# Patient Record
Sex: MALE | Race: WHITE | NOT HISPANIC OR LATINO | Employment: UNEMPLOYED | URBAN - METROPOLITAN AREA
[De-identification: names, ages, dates, MRNs, and addresses within clinical notes are randomized per-mention and may not be internally consistent; named-entity substitution may affect disease eponyms.]

---

## 2017-03-01 ENCOUNTER — APPOINTMENT (OUTPATIENT)
Dept: CT IMAGING | Facility: CLINIC | Age: 47
DRG: 896 | End: 2017-03-01
Attending: EMERGENCY MEDICINE
Payer: COMMERCIAL

## 2017-03-01 ENCOUNTER — APPOINTMENT (OUTPATIENT)
Dept: GENERAL RADIOLOGY | Facility: CLINIC | Age: 47
DRG: 896 | End: 2017-03-01
Attending: EMERGENCY MEDICINE
Payer: COMMERCIAL

## 2017-03-01 ENCOUNTER — HOSPITAL ENCOUNTER (INPATIENT)
Facility: CLINIC | Age: 47
LOS: 4 days | Discharge: HOME | DRG: 896 | End: 2017-03-05
Attending: EMERGENCY MEDICINE | Admitting: INTERNAL MEDICINE
Payer: COMMERCIAL

## 2017-03-01 DIAGNOSIS — J96.01 ACUTE RESPIRATORY FAILURE WITH HYPOXIA (H): ICD-10-CM

## 2017-03-01 DIAGNOSIS — F10.929 ALCOHOL INTOXICATION, WITH UNSPECIFIED COMPLICATION (H): ICD-10-CM

## 2017-03-01 DIAGNOSIS — J18.9 ATYPICAL PNEUMONIA: ICD-10-CM

## 2017-03-01 DIAGNOSIS — Z29.89 SEIZURE PROPHYLAXIS: ICD-10-CM

## 2017-03-01 DIAGNOSIS — J31.0 CHRONIC RHINITIS: Primary | ICD-10-CM

## 2017-03-01 PROBLEM — T51.91XD: Status: ACTIVE | Noted: 2017-03-01

## 2017-03-01 LAB
ALBUMIN SERPL-MCNC: 4.2 G/DL (ref 3.4–5)
ALBUMIN UR-MCNC: NEGATIVE MG/DL
ALP SERPL-CCNC: 89 U/L (ref 40–150)
ALT SERPL W P-5'-P-CCNC: 59 U/L (ref 0–70)
AMPHETAMINES UR QL SCN: ABNORMAL
ANION GAP SERPL CALCULATED.3IONS-SCNC: 13 MMOL/L (ref 3–14)
APPEARANCE UR: CLEAR
AST SERPL W P-5'-P-CCNC: 58 U/L (ref 0–45)
BARBITURATES UR QL: ABNORMAL
BASE DEFICIT BLDA-SCNC: 4.9 MMOL/L
BASOPHILS # BLD AUTO: 0.1 10E9/L (ref 0–0.2)
BASOPHILS NFR BLD AUTO: 0.8 %
BENZODIAZ UR QL: ABNORMAL
BILIRUB DIRECT SERPL-MCNC: <0.1 MG/DL (ref 0–0.2)
BILIRUB SERPL-MCNC: 0.3 MG/DL (ref 0.2–1.3)
BILIRUB UR QL STRIP: NEGATIVE
BUN SERPL-MCNC: 12 MG/DL (ref 7–30)
CALCIUM SERPL-MCNC: 8.1 MG/DL (ref 8.5–10.1)
CANNABINOIDS UR QL SCN: ABNORMAL
CHLORIDE SERPL-SCNC: 109 MMOL/L (ref 94–109)
CO2 SERPL-SCNC: 22 MMOL/L (ref 20–32)
COCAINE UR QL: ABNORMAL
COLOR UR AUTO: ABNORMAL
CREAT SERPL-MCNC: 0.68 MG/DL (ref 0.66–1.25)
DIFFERENTIAL METHOD BLD: ABNORMAL
EOSINOPHIL # BLD AUTO: 0.2 10E9/L (ref 0–0.7)
EOSINOPHIL NFR BLD AUTO: 1.8 %
ERYTHROCYTE [DISTWIDTH] IN BLOOD BY AUTOMATED COUNT: 14.9 % (ref 10–15)
ETHANOL SERPL-MCNC: 0.5 G/DL
ETHANOL UR QL SCN: ABNORMAL
GFR SERPL CREATININE-BSD FRML MDRD: ABNORMAL ML/MIN/1.7M2
GLUCOSE SERPL-MCNC: 118 MG/DL (ref 70–99)
GLUCOSE UR STRIP-MCNC: 30 MG/DL
GRAM STN SPEC: ABNORMAL
HCO3 BLD-SCNC: 20 MMOL/L (ref 21–28)
HCT VFR BLD AUTO: 40.1 % (ref 40–53)
HGB BLD-MCNC: 13.9 G/DL (ref 13.3–17.7)
HGB UR QL STRIP: NEGATIVE
HYALINE CASTS #/AREA URNS LPF: 2 /LPF (ref 0–2)
IMM GRANULOCYTES # BLD: 0 10E9/L (ref 0–0.4)
IMM GRANULOCYTES NFR BLD: 0.2 %
INR PPP: 0.97 (ref 0.86–1.14)
INR PPP: NORMAL (ref 0.86–1.14)
INTERPRETATION ECG - MUSE: NORMAL
KETONES UR STRIP-MCNC: NEGATIVE MG/DL
LEUKOCYTE ESTERASE UR QL STRIP: NEGATIVE
LYMPHOCYTES # BLD AUTO: 3.9 10E9/L (ref 0.8–5.3)
LYMPHOCYTES NFR BLD AUTO: 43.5 %
MAGNESIUM SERPL-MCNC: 1.9 MG/DL (ref 1.6–2.3)
MCH RBC QN AUTO: 32.3 PG (ref 26.5–33)
MCHC RBC AUTO-ENTMCNC: 34.7 G/DL (ref 31.5–36.5)
MCV RBC AUTO: 93 FL (ref 78–100)
MICRO REPORT STATUS: ABNORMAL
MONOCYTES # BLD AUTO: 0.4 10E9/L (ref 0–1.3)
MONOCYTES NFR BLD AUTO: 4.7 %
MRSA DNA SPEC QL NAA+PROBE: NORMAL
MUCOUS THREADS #/AREA URNS LPF: PRESENT /LPF
NEUTROPHILS # BLD AUTO: 4.3 10E9/L (ref 1.6–8.3)
NEUTROPHILS NFR BLD AUTO: 49 %
NITRATE UR QL: NEGATIVE
NRBC # BLD AUTO: 0 10*3/UL
NRBC BLD AUTO-RTO: 0 /100
O2/TOTAL GAS SETTING VFR VENT: 60 %
OPIATES UR QL SCN: ABNORMAL
PCO2 BLD: 36 MM HG (ref 35–45)
PH BLD: 7.35 PH (ref 7.35–7.45)
PH UR STRIP: 5.5 PH (ref 5–7)
PLATELET # BLD AUTO: 124 10E9/L (ref 150–450)
PO2 BLD: 168 MM HG (ref 80–105)
POTASSIUM SERPL-SCNC: 3.1 MMOL/L (ref 3.4–5.3)
POTASSIUM SERPL-SCNC: 4.9 MMOL/L (ref 3.4–5.3)
PROCALCITONIN SERPL-MCNC: NORMAL NG/ML
PROT SERPL-MCNC: 7.2 G/DL (ref 6.8–8.8)
RBC # BLD AUTO: 4.3 10E12/L (ref 4.4–5.9)
RBC #/AREA URNS AUTO: <1 /HPF (ref 0–2)
SODIUM SERPL-SCNC: 144 MMOL/L (ref 133–144)
SP GR UR STRIP: 1.01 (ref 1–1.03)
SPECIMEN SOURCE: ABNORMAL
SPECIMEN SOURCE: NORMAL
URN SPEC COLLECT METH UR: ABNORMAL
UROBILINOGEN UR STRIP-MCNC: NORMAL MG/DL (ref 0–2)
WBC # BLD AUTO: 8.9 10E9/L (ref 4–11)
WBC #/AREA URNS AUTO: <1 /HPF (ref 0–2)

## 2017-03-01 PROCEDURE — 84132 ASSAY OF SERUM POTASSIUM: CPT | Performed by: STUDENT IN AN ORGANIZED HEALTH CARE EDUCATION/TRAINING PROGRAM

## 2017-03-01 PROCEDURE — 84145 PROCALCITONIN (PCT): CPT | Performed by: EMERGENCY MEDICINE

## 2017-03-01 PROCEDURE — 36416 COLLJ CAPILLARY BLOOD SPEC: CPT | Performed by: STUDENT IN AN ORGANIZED HEALTH CARE EDUCATION/TRAINING PROGRAM

## 2017-03-01 PROCEDURE — 80076 HEPATIC FUNCTION PANEL: CPT | Performed by: EMERGENCY MEDICINE

## 2017-03-01 PROCEDURE — 87070 CULTURE OTHR SPECIMN AEROBIC: CPT | Performed by: EMERGENCY MEDICINE

## 2017-03-01 PROCEDURE — 94002 VENT MGMT INPAT INIT DAY: CPT

## 2017-03-01 PROCEDURE — 81001 URINALYSIS AUTO W/SCOPE: CPT | Performed by: EMERGENCY MEDICINE

## 2017-03-01 PROCEDURE — 85610 PROTHROMBIN TIME: CPT | Performed by: EMERGENCY MEDICINE

## 2017-03-01 PROCEDURE — 25000128 H RX IP 250 OP 636: Performed by: EMERGENCY MEDICINE

## 2017-03-01 PROCEDURE — 40000275 ZZH STATISTIC RCP TIME EA 10 MIN

## 2017-03-01 PROCEDURE — 87641 MR-STAPH DNA AMP PROBE: CPT | Performed by: STUDENT IN AN ORGANIZED HEALTH CARE EDUCATION/TRAINING PROGRAM

## 2017-03-01 PROCEDURE — HZ2ZZZZ DETOXIFICATION SERVICES FOR SUBSTANCE ABUSE TREATMENT: ICD-10-PCS | Performed by: EMERGENCY MEDICINE

## 2017-03-01 PROCEDURE — 71010 XR CHEST PORT 1 VW: CPT

## 2017-03-01 PROCEDURE — 25000125 ZZHC RX 250: Performed by: STUDENT IN AN ORGANIZED HEALTH CARE EDUCATION/TRAINING PROGRAM

## 2017-03-01 PROCEDURE — 40000276 ZZH STATISTIC RCP TIME ED VENT EA 10 MIN

## 2017-03-01 PROCEDURE — 80048 BASIC METABOLIC PNL TOTAL CA: CPT | Performed by: EMERGENCY MEDICINE

## 2017-03-01 PROCEDURE — 87640 STAPH A DNA AMP PROBE: CPT | Performed by: STUDENT IN AN ORGANIZED HEALTH CARE EDUCATION/TRAINING PROGRAM

## 2017-03-01 PROCEDURE — 40000281 ZZH STATISTIC TRANSPORT TIME EA 15 MIN

## 2017-03-01 PROCEDURE — 80307 DRUG TEST PRSMV CHEM ANLYZR: CPT | Performed by: EMERGENCY MEDICINE

## 2017-03-01 PROCEDURE — 80320 DRUG SCREEN QUANTALCOHOLS: CPT | Performed by: EMERGENCY MEDICINE

## 2017-03-01 PROCEDURE — 25000128 H RX IP 250 OP 636

## 2017-03-01 PROCEDURE — 36600 WITHDRAWAL OF ARTERIAL BLOOD: CPT

## 2017-03-01 PROCEDURE — 87186 SC STD MICRODIL/AGAR DIL: CPT | Performed by: EMERGENCY MEDICINE

## 2017-03-01 PROCEDURE — 5A1935Z RESPIRATORY VENTILATION, LESS THAN 24 CONSECUTIVE HOURS: ICD-10-PCS | Performed by: EMERGENCY MEDICINE

## 2017-03-01 PROCEDURE — 70450 CT HEAD/BRAIN W/O DYE: CPT

## 2017-03-01 PROCEDURE — 20000004 ZZH R&B ICU UMMC

## 2017-03-01 PROCEDURE — 82803 BLOOD GASES ANY COMBINATION: CPT | Performed by: EMERGENCY MEDICINE

## 2017-03-01 PROCEDURE — 87205 SMEAR GRAM STAIN: CPT | Performed by: EMERGENCY MEDICINE

## 2017-03-01 PROCEDURE — A7035 POS AIRWAY PRESS HEADGEAR: HCPCS

## 2017-03-01 PROCEDURE — 87077 CULTURE AEROBIC IDENTIFY: CPT | Performed by: EMERGENCY MEDICINE

## 2017-03-01 PROCEDURE — 40000940 XR CHEST PORT 1 VW

## 2017-03-01 PROCEDURE — 83735 ASSAY OF MAGNESIUM: CPT | Performed by: EMERGENCY MEDICINE

## 2017-03-01 PROCEDURE — 25000125 ZZHC RX 250: Performed by: EMERGENCY MEDICINE

## 2017-03-01 PROCEDURE — 85025 COMPLETE CBC W/AUTO DIFF WBC: CPT | Performed by: EMERGENCY MEDICINE

## 2017-03-01 PROCEDURE — 99291 CRITICAL CARE FIRST HOUR: CPT | Mod: GC | Performed by: INTERNAL MEDICINE

## 2017-03-01 RX ORDER — LORAZEPAM 2 MG/ML
INJECTION INTRAMUSCULAR
Status: COMPLETED
Start: 2017-03-01 | End: 2017-03-01

## 2017-03-01 RX ORDER — POTASSIUM CHLORIDE 1.5 G/1.58G
20-40 POWDER, FOR SOLUTION ORAL
Status: DISCONTINUED | OUTPATIENT
Start: 2017-03-01 | End: 2017-03-05 | Stop reason: HOSPADM

## 2017-03-01 RX ORDER — POTASSIUM CHLORIDE 29.8 MG/ML
20 INJECTION INTRAVENOUS
Status: DISCONTINUED | OUTPATIENT
Start: 2017-03-01 | End: 2017-03-05 | Stop reason: HOSPADM

## 2017-03-01 RX ORDER — DIAZEPAM 10 MG/2ML
5-20 INJECTION, SOLUTION INTRAMUSCULAR; INTRAVENOUS SEE ADMIN INSTRUCTIONS
Status: DISCONTINUED | OUTPATIENT
Start: 2017-03-01 | End: 2017-03-02

## 2017-03-01 RX ORDER — POTASSIUM CHLORIDE 7.45 MG/ML
10 INJECTION INTRAVENOUS
Status: DISCONTINUED | OUTPATIENT
Start: 2017-03-01 | End: 2017-03-05 | Stop reason: HOSPADM

## 2017-03-01 RX ORDER — PROPOFOL 10 MG/ML
5-75 INJECTION, EMULSION INTRAVENOUS CONTINUOUS
Status: DISCONTINUED | OUTPATIENT
Start: 2017-03-01 | End: 2017-03-01

## 2017-03-01 RX ORDER — DIAZEPAM 5 MG
5-20 TABLET ORAL SEE ADMIN INSTRUCTIONS
Status: DISCONTINUED | OUTPATIENT
Start: 2017-03-01 | End: 2017-03-05 | Stop reason: HOSPADM

## 2017-03-01 RX ORDER — POTASSIUM CHLORIDE 750 MG/1
20-40 TABLET, EXTENDED RELEASE ORAL
Status: DISCONTINUED | OUTPATIENT
Start: 2017-03-01 | End: 2017-03-05 | Stop reason: HOSPADM

## 2017-03-01 RX ORDER — PROPOFOL 10 MG/ML
10-20 INJECTION, EMULSION INTRAVENOUS EVERY 30 MIN PRN
Status: DISCONTINUED | OUTPATIENT
Start: 2017-03-01 | End: 2017-03-02

## 2017-03-01 RX ORDER — NALOXONE HYDROCHLORIDE 0.4 MG/ML
.1-.4 INJECTION, SOLUTION INTRAMUSCULAR; INTRAVENOUS; SUBCUTANEOUS
Status: DISCONTINUED | OUTPATIENT
Start: 2017-03-01 | End: 2017-03-05 | Stop reason: HOSPADM

## 2017-03-01 RX ORDER — LORAZEPAM 2 MG/ML
1 INJECTION INTRAMUSCULAR ONCE
Status: COMPLETED | OUTPATIENT
Start: 2017-03-01 | End: 2017-03-01

## 2017-03-01 RX ORDER — LEVETIRACETAM 750 MG/1
1500 TABLET ORAL 2 TIMES DAILY
Status: DISCONTINUED | OUTPATIENT
Start: 2017-03-01 | End: 2017-03-01

## 2017-03-01 RX ORDER — NALOXONE HYDROCHLORIDE 0.4 MG/ML
.1-.4 INJECTION, SOLUTION INTRAMUSCULAR; INTRAVENOUS; SUBCUTANEOUS
Status: DISCONTINUED | OUTPATIENT
Start: 2017-03-01 | End: 2017-03-01

## 2017-03-01 RX ORDER — SODIUM CHLORIDE 9 MG/ML
1000 INJECTION, SOLUTION INTRAVENOUS CONTINUOUS
Status: DISCONTINUED | OUTPATIENT
Start: 2017-03-01 | End: 2017-03-03

## 2017-03-01 RX ORDER — LORAZEPAM 2 MG/ML
1 INJECTION INTRAMUSCULAR
Status: DISCONTINUED | OUTPATIENT
Start: 2017-03-01 | End: 2017-03-02

## 2017-03-01 RX ORDER — PROPOFOL 10 MG/ML
5-75 INJECTION, EMULSION INTRAVENOUS CONTINUOUS
Status: DISCONTINUED | OUTPATIENT
Start: 2017-03-01 | End: 2017-03-02

## 2017-03-01 RX ADMIN — SODIUM CHLORIDE 1000 ML: 9 INJECTION, SOLUTION INTRAVENOUS at 10:49

## 2017-03-01 RX ADMIN — LORAZEPAM 1 MG: 2 INJECTION INTRAMUSCULAR; INTRAVENOUS at 12:15

## 2017-03-01 RX ADMIN — SODIUM CHLORIDE 1000 ML: 9 INJECTION, SOLUTION INTRAVENOUS at 18:26

## 2017-03-01 RX ADMIN — SUCCINYLCHOLINE CHLORIDE 100 MG: 20 INJECTION, SOLUTION INTRAMUSCULAR; INTRAVENOUS at 10:52

## 2017-03-01 RX ADMIN — LORAZEPAM 1 MG: 2 INJECTION INTRAMUSCULAR; INTRAVENOUS at 15:19

## 2017-03-01 RX ADMIN — FOLIC ACID: 5 INJECTION, SOLUTION INTRAMUSCULAR; INTRAVENOUS; SUBCUTANEOUS at 18:26

## 2017-03-01 RX ADMIN — LORAZEPAM 1 MG: 2 INJECTION INTRAMUSCULAR; INTRAVENOUS at 13:39

## 2017-03-01 RX ADMIN — LORAZEPAM 1 MG: 2 INJECTION INTRAMUSCULAR; INTRAVENOUS at 15:34

## 2017-03-01 RX ADMIN — LORAZEPAM 1 MG: 2 INJECTION INTRAMUSCULAR; INTRAVENOUS at 15:41

## 2017-03-01 RX ADMIN — SODIUM CHLORIDE 500 ML: 9 INJECTION, SOLUTION INTRAVENOUS at 13:41

## 2017-03-01 RX ADMIN — LORAZEPAM 1 MG: 2 INJECTION INTRAMUSCULAR; INTRAVENOUS at 12:30

## 2017-03-01 RX ADMIN — LORAZEPAM 1 MG: 2 INJECTION INTRAMUSCULAR; INTRAVENOUS at 11:10

## 2017-03-01 RX ADMIN — SODIUM CHLORIDE 500 ML: 9 INJECTION, SOLUTION INTRAVENOUS at 14:22

## 2017-03-01 RX ADMIN — LORAZEPAM 1 MG: 2 INJECTION INTRAMUSCULAR at 11:10

## 2017-03-01 RX ADMIN — SODIUM CHLORIDE 1000 ML: 9 INJECTION, SOLUTION INTRAVENOUS at 08:42

## 2017-03-01 RX ADMIN — FOLIC ACID: 5 INJECTION, SOLUTION INTRAMUSCULAR; INTRAVENOUS; SUBCUTANEOUS at 09:25

## 2017-03-01 RX ADMIN — PROPOFOL 5 MCG/KG/MIN: 10 INJECTION, EMULSION INTRAVENOUS at 15:44

## 2017-03-01 NOTE — ED NOTES
Anirudh presents by ambulance after a bystander witnessed him passed out in a doorway and called EMS. Anirudh appears intoxicated with decreased LOC and snoring respirations. History of substance abuse. EMS stated he smelled like mouthwash. No sign of injury or trauma. Satting in the 70s on RA on arrival, nasal airway and face mask placed with 5L 02. Now satting 98%.

## 2017-03-01 NOTE — PROGRESS NOTES
Report given to Roopa ALMAGUER on 4C, bed unavailable. 4C will update when bed becomes available.

## 2017-03-01 NOTE — IP AVS SNAPSHOT
[address] [address]    Phone:  [phone]                                       After Visit Summary   3/1/2017    [facility] AURELIO Motley    MRN: [id number]           After Visit Summary Signature Page     I have received my discharge instructions, and my questions have been answered. I have discussed any challenges I see with this plan with the nurse or doctor.    ..........................................................................................................................................  Patient/Patient Representative Signature      ..........................................................................................................................................  Patient Representative Print Name and Relationship to Patient    ..................................................               ................................................  Date                                            Time    ..........................................................................................................................................  Reviewed by Signature/Title    ...................................................              ..............................................  Date                                                            Time

## 2017-03-01 NOTE — PROGRESS NOTES
Pt arerived to unit accompanied by ED RNs and RT. Mechanically ventilated and sedated. Occasionally agitated and needing restraints. Unable to follow commands. VSS, afebrile. Propofol infusing @ 25 mcg/kg/min, NS infusing @ 125 mL/hr. Belongings documented.

## 2017-03-01 NOTE — PROGRESS NOTES
[facility], [place]  Procedure Note           Intubation:       Anirudh AURELIO Motley  MRN# [id number]   March 1, 2017, 11:10 AM Indication: Inability to protect airway           Patient intubated at: March 1, 2017  11:10 AM   Patient informed of: Why intubation was required   Informed consent: Not obtainable   Cervical spine: Was not stabilized during the procedure   Sedative medication: Was not administered during the procedure   Technique used: Fiberoptic visualization with GlideScope   Endotracheal tube size: 7.5 cm with cuff   Number of attempts: 3   Placement confirmed by: Auscultation of bilateral breath sounds  Visualization of bilateral chest wall rise  End-tidal CO2 monitor  Chest X-ray   ET tube repositioning: Was not performed   Tube secured at: 25 cm @ teeth      Successful ETT placement on 3rd attempt with BURP and bougie. Pt able to be easily bagged with 2-person BVM and OPA in between attempts. Tube secured with commercial device at 25 cm @ teeth. Positive colormetric change, negative epigastric sounds, equal bilateral breath sounds. ETT suctioned for moderate amount of thick cloudy secretions. See flowsheet for vent settings. RT will continue to follow.      Recorded by Kelly Ling, RRT

## 2017-03-01 NOTE — ED NOTES
Critical Care Time (RN) Mins: 2 RNs times 30 minutes.  Additional 1 RN for next 30 minutes.    Total minutes: 90 minutes.

## 2017-03-01 NOTE — PROGRESS NOTES
Contacted about 1 hour ago in regards to admission to ICU. Working with patient placement. Recommend ABG.     Jace Wiley MD  Pulmonary and Critical Care  [facility]  Pager:  [phone]

## 2017-03-01 NOTE — PHARMACY-ADMISSION MEDICATION HISTORY
"Admission medication history interview status for the 3/1/2017 admission is complete. See Epic admission navigator for allergy information, pharmacy, prior to admission medications and immunization status.     Medication history interview sources:  pt's home medication bottle, Care Everywhere, [facility] (telephone [phone])    Changes made to PTA medication list (reason)  Deleted: amlodipine 5 mg tabs - 1 QD (Care Everywhere note from [facility] on 7/20/16 states the pt is not taking and it was discontinued -- however this note had also discontinued folic acid 1 mg - 1 QD and thiamine 100 mg - 1 QD. Given the pt's current situation of chronic alcohol use and the fact that he has a rx bottle for thiamine with him both the folic acid and thiamine prescriptions were left on the PTA med list), fluoxetine 20 mg cap - 1 QD (there is no indication from [facility] or Care Everywhere notes dating back to 12/15 that indicate the pt has been on this medication as of recently, therefore it was removed)    Additional medication history information (including reliability of information, actions taken by pharmacist):  1. The pt presented to the ED in an altered mental status, with two medication bottles, levetiracetam 1500 mg BID filled on 2/25/17and thiamine 100 mg QD filled on 2/26/17 -- these were left as last dose \"Verified, pt has rx bottles with him at Unknown time\". Both of these prescriptions were from [facility]. I called [facility] pharmacy, they relayed to me the pt has only filled two other prescriptions recently at their pharmacy within the past 3 months these are valacyclovir 1 g TID x 10 days for Herpes Zoster filled on 2/13/17 and naproxen 500 mg BID PRN for pain filled on 2/26/17. These were not added to the pt's current medication list because he did not present with either of these medications.      Prior to Admission medications    Medication Sig Last Dose Taking? Auth " Provider   LevETIRAcetam (KEPPRA PO) Take 1,500 mg by mouth 2 times daily Verified, pt has rx bottle with him at Unknown time Yes Unknown, Entered By History   thiamine 100 MG tablet Take 1 tablet by mouth daily. Verified, pt has rx bottle with him at Unknown time Yes Darion Dickey MD   folic acid (FOLVITE) 1 MG tablet Take 1 tablet by mouth daily. Unknown at Unknown time  Darion Dickey MD   multivitamin, therapeutic with minerals (THERA-VIT-M) TABS Take 1 tablet by mouth daily. Unknown at Unknown time  Darion Dickey MD       Medication history completed by: Rodrigo Torres, PharmD Student

## 2017-03-01 NOTE — IP AVS SNAPSHOT
MRN:[id number]                      After Visit Summary   3/1/2017    [facility] AURELIO Motley    MRN: [id number]           Thank you!     Thank you for choosing [facility] for your care. Our goal is always to provide you with excellent care. Hearing back from our patients is one way we can continue to improve our services. Please take a few minutes to complete the written survey that you may receive in the mail after you visit with us. Thank you!        Patient Information     Date Of Birth          1970        About your hospital stay     You were admitted on:  March 1, 2017 You last received care in the:  [address] [facility]    You were discharged on:  March 5, 2017        Reason for your hospital stay       You were admitted with alcohol intoxication and were treated through your withdrawal.                  Who to Call     For medical emergencies, please call 911.  For non-urgent questions about your medical care, please call your primary care provider or clinic, [phone]          Attending Provider     Provider Specialty    Peter Francis MD Emergency Medicine    Nara Visa, Jace Coffman MD Pulmonary    Radha Allen MD Pediatrics       Primary Care Provider Office Phone # Fax #    [facility] [phone]       [address] [place] [address]        After Care Instructions     Activity       Your activity upon discharge: activity as tolerated            Diet       Follow this diet upon discharge: Orders Placed This Encounter      Room Service      Regular Diet Adult                  Follow-up Appointments     Follow Up and recommended labs and tests       Follow up with primary care provider, [facility], within 7 days for hospital follow- up.  No follow up labs or test are needed.                  Additional Services     Medication Therapy Management Referral       Reason for referral:  adherence to  "medication regimen    This service is designed to help you get the most from your medications.  A specially trained pharmacist will work closely with you and your doctors  to solve any problems related to your medications and to help you get the   best results from taking them.      The Medication Therapy Management staff will call you to schedule an appointment.                  Pending Results     Date and Time Order Name Status Description    3/1/2017 1202 Sputum Culture Aerobic Bacterial Preliminary             Statement of Approval     Ordered          17 1324  I have reviewed and agree with all the recommendations and orders detailed in this document.  EFFECTIVE NOW     Approved and electronically signed by:  Radha Allen MD             Admission Information     Date & Time Provider Department Dept. Phone    3/1/2017 Radha Allen MD [address] [facility] [place] [phone]      Your Vitals Were     Blood Pressure Pulse Temperature Respirations Weight Pulse Oximetry    134/87 (BP Location: Right arm) 60 96.4  F (35.8  C) (Oral) 16 103.4 kg (228 lb) 97%    BMI (Body Mass Index)                   33.67 kg/m2           MyChart Information     [employer] lets you send messages to your doctor, view your test results, renew your prescriptions, schedule appointments and more. To sign up, go to [email or web address] . Click on \"Log in\" on the left side of the screen, which will take you to the Welcome page. Then click on \"Sign up Now\" on the right side of the page.     You will be asked to enter the access code listed below, as well as some personal information. Please follow the directions to create your username and password.     Your access code is: [email or web address]  Expires: 6/3/2017  1:39 PM     Your access code will  in 90 days. If you need help or a new code, please call your [place] clinic or [phone].        Care EveryWhere ID     This is your Care EveryWhere ID. This could be used by " other organizations to access your [facility] medical records  [id number]           Review of your medicines      START taking        Dose / Directions    azithromycin 250 MG tablet   Commonly known as:  ZITHROMAX   Indication:  Community Acquired Pneumonia   Used for:  Atypical pneumonia        Dose:  250 mg   Take 1 tablet (250 mg) by mouth daily for 3 days   Quantity:  3 tablet   Refills:  0       fluticasone 50 MCG/ACT spray   Commonly known as:  FLONASE   Used for:  Chronic rhinitis        Dose:  1 spray   Spray 1 spray into both nostrils daily   Quantity:  1 Bottle   Refills:  0         CONTINUE these medicines which may have CHANGED, or have new prescriptions. If we are uncertain of the size of tablets/capsules you have at home, strength may be listed as something that might have changed.        Dose / Directions    levETIRAcetam 1000 MG Tabs   Commonly known as:  KEPPRA   This may have changed:  medication strength   Used for:  Seizure prophylaxis        Dose:  1500 mg   Take 1,500 mg by mouth 2 times daily   Quantity:  60 tablet   Refills:  0            Where to get your medicines      These medications were sent to [facility] [address] [address]     Phone:  [phone]     azithromycin 250 MG tablet    fluticasone 50 MCG/ACT spray    levETIRAcetam 1000 MG Tabs                Protect others around you: Learn how to safely use, store and throw away your medicines at www.disposemymeds.org.             Medication List: This is a list of all your medications and when to take them. Check marks below indicate your daily home schedule. Keep this list as a reference.      Medications           Morning Afternoon Evening Bedtime As Needed    azithromycin 250 MG tablet   Commonly known as:  ZITHROMAX   Take 1 tablet (250 mg) by mouth daily for 3 days   Last time this was given:  250 mg on 3/5/2017  8:30 AM                                 fluticasone 50 MCG/ACT spray   Commonly known as:  FLONASE   Spray 1 spray into both nostrils daily   Last time this was given:  1 spray on 3/5/2017 11:20 AM                                levETIRAcetam 1000 MG Tabs   Commonly known as:  KEPPRA   Take 1,500 mg by mouth 2 times daily   Last time this was given:  1,500 mg on 3/5/2017  8:30 AM

## 2017-03-01 NOTE — ED PROVIDER NOTES
History     Chief Complaint   Patient presents with     Alcohol Intoxication     HPI  [facility] AURELIO Motley is a 46 year old male with a history of alcohol abuse and 3 previous admissions for her intoxication who presents after a bystander notified emergency response at the client was passed out in the doorway.  Per paramedics, he was noted to be intoxicated with snoring respirations and minimal response.  He apparently smelled like alcohol.  As he was manifesting oxygen saturations in the 70s on room air, nasal airway in facemask were applied.  Patient is unable to give any type of history secondary to his intoxication.  I have reviewed the Medications, Allergies, Past Medical and Surgical History, and Social History in the Epic system.    Review of Systems   Unable to perform ROS: Patient unresponsive       Physical Exam   BP: 136/88  Heart Rate: 91  Temp: 96  F (35.6  C)  Resp: 15  Weight: 90.7 kg (200 lb)  SpO2: (!) 75 %  Physical Exam   Constitutional: He appears well-developed. No distress.   HENT:   Head: Normocephalic and atraumatic.   Mouth/Throat: Oropharynx is clear and moist. No oropharyngeal exudate.   Eyes: Pupils are equal, round, and reactive to light. No scleral icterus. Right eye exhibits no nystagmus. Left eye exhibits no nystagmus.   Cardiovascular: Normal heart sounds and intact distal pulses.    Pulmonary/Chest: Breath sounds normal. No respiratory distress.   Abdominal: Soft. Bowel sounds are normal. There is no tenderness.   Musculoskeletal: He exhibits no edema or tenderness.   Neurological: He is unresponsive. Cranial nerve deficit: bilateral, lateral gaze nystagmus. GCS eye subscore is 2. GCS verbal subscore is 2. GCS motor subscore is 5.   Skin: Skin is warm. No rash noted. He is not diaphoretic.   Psychiatric: His speech is not slurred. He is noncommunicative. He is inattentive.       ED Course     ED Course     Intubation  Date/Time: 3/1/2017 11:23 AM  Performed by: CHI BUTCHER  "by: CHI FRANCIS   Consent: The procedure was performed in an emergent situation. Verbal consent not obtained.  Imaging studies: imaging studies available  Patient identity confirmed: arm band  Time out: Immediately prior to procedure a \"time out\" was called to verify the correct patient, procedure, equipment, support staff and site/side marked as required.  Indications: airway protection and  hypoxemia  Intubation method: video-assisted  Patient status: unconscious  Preoxygenation: nonrebreather mask  Pretreatment medications: none  Sedatives: see MAR for details  Paralytic: succinylcholine  Laryngoscope size: Mac 3  Tube size: 8.0 mm  Tube type: cuffed  Number of attempts: 3  Ventilation between attempts: BVM  Cricoid pressure: no  Cords visualized: yes  Post-procedure assessment: chest rise and CO2 detector  Breath sounds: equal  Cuff inflated: yes  ETT to lip: 25 cm  ETT to teeth: 23 cm  Tube secured with: ETT arango  Chest x-ray interpreted by me.  Chest x-ray findings: endotracheal tube in appropriate position  Patient tolerance: Patient tolerated the procedure well with no immediate complications                   EKG Interpretation:      Interpreted by Chi Francis  Time reviewed: 8:20 AM  Symptoms at time of EKG: None   Rhythm: normal sinus   Rate: Normal  Axis: Normal  Ectopy: none  Conduction: normal  ST Segments/ T Waves: No ST-T wave changes  Q Waves: III  Comparison to prior: Unchanged from 04/15/2014 other than Q-wave in lead 3 is more prominent    Clinical Impression: no acute changes  Results for orders placed or performed during the hospital encounter of 03/01/17   Chest  XR, 1 view portable    Narrative    Exam: XR CHEST PORT 1 VW, 3/1/2017 11:17 AM    Indication: Intubation: History of alcohol abuse suspected  intoxication.    Comparison: 4/16/2014;    Findings:   AP view of the chest. Endotracheal tube projecting about 3.5 cm above  the ollie. Low lung volumes. Left cardiophrenic angle is " not  completely included. No pleural effusion or pneumothorax. Bibasilar  opacities likely representing basilar atelectasis. Cardiac silhouette  is prominent, possibly due to low lung volume      Impression    Impression:   1. Endotracheal tube projecting about 3.5 cm above the ollie.  2. Bibasilar opacities, may represent atelectasis with low lung  volumes. Additional consideration includes aspiration or mild  pulmonary edema.  3. Prominent cardiac silhouette which may be secondary to low lung  volumes and position.    I have personally reviewed the examination and initial interpretation  and I agree with the findings.    ARJUN MACIEL MD   Head CT w/o contrast    Narrative    CT HEAD W/O CONTRAST 3/1/2017 12:27 PM    Provided History: Unresponsive, rule out bleed    Comparison: 4/15/2014 dated head CT.    Technique: Using multidetector thin collimation helical acquisition  technique, axial, coronal and sagittal CT images from the skull base  to the vertex were obtained without intravenous contrast.     Findings:    No intracranial hemorrhage, mass effect, or midline shift. The  ventricles are proportionate to the cerebral sulci. The gray to white  matter differentiation of the cerebral hemispheres is preserved. The  basal cisterns are patent.    The visualized paranasal sinuses are clear. The mastoid air cells are  clear.       Impression    Impression: No acute intracranial pathology.    MIL GROSS MD   CBC with platelets differential   Result Value Ref Range    WBC 8.9 4.0 - 11.0 10e9/L    RBC Count 4.30 (L) 4.4 - 5.9 10e12/L    Hemoglobin 13.9 13.3 - 17.7 g/dL    Hematocrit 40.1 40.0 - 53.0 %    MCV 93 78 - 100 fl    MCH 32.3 26.5 - 33.0 pg    MCHC 34.7 31.5 - 36.5 g/dL    RDW 14.9 10.0 - 15.0 %    Platelet Count 124 (L) 150 - 450 10e9/L    Diff Method Automated Method     % Neutrophils 49.0 %    % Lymphocytes 43.5 %    % Monocytes 4.7 %    % Eosinophils 1.8 %    % Basophils 0.8 %    % Immature  Granulocytes 0.2 %    Nucleated RBCs 0 0 /100    Absolute Neutrophil 4.3 1.6 - 8.3 10e9/L    Absolute Lymphocytes 3.9 0.8 - 5.3 10e9/L    Absolute Monocytes 0.4 0.0 - 1.3 10e9/L    Absolute Eosinophils 0.2 0.0 - 0.7 10e9/L    Absolute Basophils 0.1 0.0 - 0.2 10e9/L    Abs Immature Granulocytes 0.0 0 - 0.4 10e9/L    Absolute Nucleated RBC 0.0    Basic metabolic panel   Result Value Ref Range    Sodium 144 133 - 144 mmol/L    Potassium 3.1 (L) 3.4 - 5.3 mmol/L    Chloride 109 94 - 109 mmol/L    Carbon Dioxide 22 20 - 32 mmol/L    Anion Gap 13 3 - 14 mmol/L    Glucose 118 (H) 70 - 99 mg/dL    Urea Nitrogen 12 7 - 30 mg/dL    Creatinine 0.68 0.66 - 1.25 mg/dL    GFR Estimate >90  Non  GFR Calc   >60 mL/min/1.7m2    GFR Estimate If Black >90   GFR Calc   >60 mL/min/1.7m2    Calcium 8.1 (L) 8.5 - 10.1 mg/dL   Alcohol ethyl   Result Value Ref Range    Ethanol g/dL 0.50 (HH) <0.01 g/dL   Drug abuse screen 6 urine (tox)   Result Value Ref Range    Amphetamine Qual Urine  NEG     Negative   Cutoff for a negative amphetamine is 500 ng/mL or less.      Barbiturates Qual Urine  NEG     Negative   Cutoff for a negative barbiturate is 200 ng/mL or less.      Benzodiazepine Qual Urine (A) NEG     Positive   Cutoff for a positive benzodiazepine is greater than 200 ng/mL. This is an   unconfirmed screening result to be used for medical purposes only.      Cannabinoids Qual Urine  NEG     Negative   Cutoff for a negative cannabinoid is 50 ng/mL or less.      Cocaine Qual Urine  NEG     Negative   Cutoff for a negative cocaine is 300 ng/mL or less.      Ethanol Qual Urine (A) NEG     Positive   Cutoff for a positive urine ethanol is greater than 0.05 g/dL.  This is an   unconfirmed screening result to be used for medical purposes only.      Opiates Qualitative Urine  NEG     Negative   Cutoff for a negative opiate is 300 ng/mL or less.     UA with Microscopic reflex to Culture   Result Value Ref Range     Color Urine Light Yellow     Appearance Urine Clear     Glucose Urine 30 (A) NEG mg/dL    Bilirubin Urine Negative NEG    Ketones Urine Negative NEG mg/dL    Specific Gravity Urine 1.006 1.003 - 1.035    Blood Urine Negative NEG    pH Urine 5.5 5.0 - 7.0 pH    Protein Albumin Urine Negative NEG mg/dL    Urobilinogen mg/dL Normal 0.0 - 2.0 mg/dL    Nitrite Urine Negative NEG    Leukocyte Esterase Urine Negative NEG    Source Midstream Urine     WBC Urine <1 0 - 2 /HPF    RBC Urine <1 0 - 2 /HPF    Mucous Urine Present (A) NEG /LPF    Hyaline Casts 2 0 - 2 /LPF   Hepatic panel   Result Value Ref Range    Bilirubin Direct <0.1 0.0 - 0.2 mg/dL    Bilirubin Total 0.3 0.2 - 1.3 mg/dL    Albumin 4.2 3.4 - 5.0 g/dL    Protein Total 7.2 6.8 - 8.8 g/dL    Alkaline Phosphatase 89 40 - 150 U/L    ALT 59 0 - 70 U/L    AST 58 (H) 0 - 45 U/L   Magnesium   Result Value Ref Range    Magnesium 1.9 1.6 - 2.3 mg/dL   INR   Result Value Ref Range    INR  0.86 - 1.14     Unsatisfactory specimen - tube underfilled  TY MANN 0950 3/1/17 BY DL     INR   Result Value Ref Range    INR 0.97 0.86 - 1.14   Blood gas arterial (ABG)   Result Value Ref Range    pH Arterial 7.35 7.35 - 7.45 pH    pCO2 Arterial 36 35 - 45 mm Hg    pO2 Arterial 168 (H) 80 - 105 mm Hg    Bicarbonate Arterial 20 (L) 21 - 28 mmol/L    Base Deficit Art 4.9 mmol/L    FIO2 60.0    EKG 12-lead, tracing only   Result Value Ref Range    Interpretation ECG Click View Image link to view waveform and result      Medications   0.9% sodium chloride BOLUS (0 mLs Intravenous Stopped 3/1/17 0923)     Followed by   0.9% sodium chloride infusion (1,000 mLs Intravenous Rate/Dose Verify 3/1/17 1251)   LORazepam (ATIVAN) injection 1 mg (1 mg Intravenous Given 3/1/17 1339)   0.9% sodium chloride BOLUS (500 mLs Intravenous New Bag 3/1/17 1341)   0.9% sodium chloride BOLUS (500 mLs Intravenous New Bag 3/1/17 1422)   dextrose 5% and 0.9% NaCl 1,000 mL with multivitamin-ADULT (INFUVITE) 10  mL, thiamine 100 mg, folic acid 1 mg, magnesium sulfate 2 g infusion ( Intravenous Stopped 3/1/17 1049)   succinylcholine (ANECTINE) injection 100 mg (100 mg Intravenous Given 3/1/17 1052)   LORazepam (ATIVAN) injection 1 mg (1 mg Intravenous Given 3/1/17 1110)                   Critical Care time:  was 95 minutes for this patient excluding procedures.               Labs Ordered and Resulted from Time of ED Arrival Up to the Time of Departure from the ED   CBC WITH PLATELETS DIFFERENTIAL - Abnormal; Notable for the following:        Result Value    RBC Count 4.30 (*)     Platelet Count 124 (*)     All other components within normal limits   BASIC METABOLIC PANEL - Abnormal; Notable for the following:     Potassium 3.1 (*)     Glucose 118 (*)     Calcium 8.1 (*)     All other components within normal limits   ALCOHOL ETHYL - Abnormal; Notable for the following:     Ethanol g/dL 0.50 (*)     All other components within normal limits   DRUG ABUSE SCREEN 6 CHEM DEP URINE ([facility]) - Abnormal; Notable for the following:     Benzodiazepine Qual Urine   (*)     Value: Positive   Cutoff for a positive benzodiazepine is greater than 200 ng/mL. This is an   unconfirmed screening result to be used for medical purposes only.      Ethanol Qual Urine   (*)     Value: Positive   Cutoff for a positive urine ethanol is greater than 0.05 g/dL.  This is an   unconfirmed screening result to be used for medical purposes only.      All other components within normal limits   ROUTINE UA WITH MICROSCOPIC REFLEX TO CULTURE - Abnormal; Notable for the following:     Glucose Urine 30 (*)     Mucous Urine Present (*)     All other components within normal limits   HEPATIC PANEL - Abnormal; Notable for the following:     AST 58 (*)     All other components within normal limits   BLOOD GAS ARTERIAL - Abnormal; Notable for the following:     pO2 Arterial 168 (*)     Bicarbonate Arterial 20 (*)     All other components within normal limits    MAGNESIUM   INR   INR   NURSING OBSERVATION   PATIENT CARE ORDER   REMOVE   PERIPHERAL IV CATHETER   CARDIAC CONTINUOUS MONITORING   PULSE OXIMETRY NURSING   PULSE OXIMETRY NURSING   CARDIAC CONTINUOUS MONITORING   SPUTUM CULTURE AEROBIC BACTERIAL   GRAM STAIN     Medications   0.9% sodium chloride BOLUS (0 mLs Intravenous Stopped 3/1/17 0923)     Followed by   0.9% sodium chloride infusion (1,000 mLs Intravenous Rate/Dose Verify 3/1/17 1251)   LORazepam (ATIVAN) injection 1 mg (1 mg Intravenous Given 3/1/17 1339)   0.9% sodium chloride BOLUS (500 mLs Intravenous New Bag 3/1/17 1341)   0.9% sodium chloride BOLUS (500 mLs Intravenous New Bag 3/1/17 1422)   dextrose 5% and 0.9% NaCl 1,000 mL with multivitamin-ADULT (INFUVITE) 10 mL, thiamine 100 mg, folic acid 1 mg, magnesium sulfate 2 g infusion ( Intravenous Stopped 3/1/17 1049)   succinylcholine (ANECTINE) injection 100 mg (100 mg Intravenous Given 3/1/17 1052)   LORazepam (ATIVAN) injection 1 mg (1 mg Intravenous Given 3/1/17 1110)       Assessments & Plan (with Medical Decision Making)   46-year-old male presents via paramedics after being found in a doorway smelling of mouthwash/alcohol.  Patient was unresponsive and initially exam revealed strong smell of alcohol and no focal neurologic findings or evidence of trauma.  Laboratories were obtained revealing significantly elevated alcohol level of 0.5, CBC that was normal with exception of a low platelet count of 124 consistent with chronic alcohol use, a sick metabolic panel with elevated glucose consistent with stress response and slightly low potassium and calcium.  Urine toxicology was positive for ethanol and benzodiazepine use.  Patient was initially placed on a monitor with a nasal trumpet in place and observed closely.  He received a liter of fluid in addition to magnesium, folate, thiamine and multivitamin infusion.  After initial 2 hours she was noted to have progressive decrease in oxygen saturations  despite opening his airway.  This prompted intubation as noted above.  Patient was treated with intermittent Ativan with good sedation. A blood gas following intubation revealed normal pH and PCO2 was slightly low bicarbonate.  Sputum culture was obtained and is pending.  Chest x-ray confirmed ET tube with possibility of aspiration pneumonia.   Head CT was obtained revealing  Acute intracranial pathology  The case was discussed at length with the ICU staff who has agreed to admit the patient.    I have reviewed the nursing notes.    I have reviewed the findings, diagnosis, plan and need for follow up with the patient.    New Prescriptions    No medications on file       Final diagnoses:   Alcohol intoxication, with unspecified complication (H)   Acute respiratory failure with hypoxia (H)       3/1/2017   [facility], [place], EMERGENCY DEPARTMENT     Peter Francis MD  03/01/17 [id number]

## 2017-03-01 NOTE — LETTER
Transition Communication Hand-off for Care Transitions to Next Level of Care Provider    Name: Anirudh Motley  MRN #: [id number]  Primary Care Provider: [facility]     Primary Clinic: [address] [facility] [address] [place] [address]     Reason for Hospitalization:  Acute respiratory failure with hypoxia (H) [J96.01]  Alcohol intoxication, with unspecified complication (H) [F10.129]  Admit Date/Time: 3/1/2017  7:54 AM  Discharge Date: 03/05/17  Payor Source: Payor: BLUE PLUS / Plan: BLUE PLUS MA / Product Type: HMO /     Anirudh came to [facility] due to acute respiratory failure. He was intubated in our ICU and had a blood alcohol level of 0.5 on admission. Anirudh also previously presented to [facility] ED on 02/28/17.  met with Anirudh to discuss chemical dependency resources as well as community resources. Anirudh continues to be chronically homeless and continues to heavily use alcohol. Anirudh declined formal chemical dependency treatment and shared that his primary goals were to obtain transitional housing and work. We d/c him on 3/5/17 and his plans were to go stay with his brother (in [place]) for one night before returning to [facility]. We provided d/c medications at our pharmacy on day of d/c.     GOYO Fisher, [facility]  , Weekend [facility]  Pager: [phone] (Sat & Sun 8 am - 4:30 pm), on-call/after hours pager [phone]

## 2017-03-01 NOTE — ED NOTES
Pt appeared apneic, sats dropped to 80s on 10L face mask with nasopharangeal airway in place. MD Celaya and Penny at bedside. Pupils 8mm with right downward gaze. Pt's O2 sats improved with increased o2 level of 15L and jaw thrust maneuver. Order to intubate received.

## 2017-03-02 LAB
ANION GAP SERPL CALCULATED.3IONS-SCNC: 9 MMOL/L (ref 3–14)
BASOPHILS # BLD AUTO: 0 10E9/L (ref 0–0.2)
BASOPHILS NFR BLD AUTO: 0.4 %
BUN SERPL-MCNC: 7 MG/DL (ref 7–30)
BUN SERPL-MCNC: 9 MG/DL (ref 7–30)
CALCIUM SERPL-MCNC: 7.3 MG/DL (ref 8.5–10.1)
CALCIUM SERPL-MCNC: 7.8 MG/DL (ref 8.5–10.1)
CHLORIDE SERPL-SCNC: 102 MMOL/L (ref 94–109)
CHLORIDE SERPL-SCNC: 117 MMOL/L (ref 94–109)
CO2 SERPL-SCNC: 16 MMOL/L (ref 20–32)
CO2 SERPL-SCNC: 27 MMOL/L (ref 20–32)
CREAT SERPL-MCNC: 0.58 MG/DL (ref 0.66–1.25)
CREAT SERPL-MCNC: 0.66 MG/DL (ref 0.66–1.25)
DIFFERENTIAL METHOD BLD: ABNORMAL
EOSINOPHIL # BLD AUTO: 0.1 10E9/L (ref 0–0.7)
EOSINOPHIL NFR BLD AUTO: 0.6 %
ERYTHROCYTE [DISTWIDTH] IN BLOOD BY AUTOMATED COUNT: 15.1 % (ref 10–15)
GFR SERPL CREATININE-BSD FRML MDRD: ABNORMAL ML/MIN/1.7M2
GFR SERPL CREATININE-BSD FRML MDRD: NORMAL ML/MIN/1.7M2
GLUCOSE SERPL-MCNC: 114 MG/DL (ref 70–99)
GLUCOSE SERPL-MCNC: 81 MG/DL (ref 70–99)
HCT VFR BLD AUTO: 39 % (ref 40–53)
HGB BLD-MCNC: 13 G/DL (ref 13.3–17.7)
IMM GRANULOCYTES # BLD: 0 10E9/L (ref 0–0.4)
IMM GRANULOCYTES NFR BLD: 0.2 %
LYMPHOCYTES # BLD AUTO: 1 10E9/L (ref 0.8–5.3)
LYMPHOCYTES NFR BLD AUTO: 10.4 %
MAGNESIUM SERPL-MCNC: 1.7 MG/DL (ref 1.6–2.3)
MCH RBC QN AUTO: 31.9 PG (ref 26.5–33)
MCHC RBC AUTO-ENTMCNC: 33.3 G/DL (ref 31.5–36.5)
MCV RBC AUTO: 96 FL (ref 78–100)
MONOCYTES # BLD AUTO: 0.6 10E9/L (ref 0–1.3)
MONOCYTES NFR BLD AUTO: 5.7 %
NEUTROPHILS # BLD AUTO: 8 10E9/L (ref 1.6–8.3)
NEUTROPHILS NFR BLD AUTO: 82.7 %
NRBC # BLD AUTO: 0 10*3/UL
NRBC BLD AUTO-RTO: 0 /100
PHOSPHATE SERPL-MCNC: 1.9 MG/DL (ref 2.5–4.5)
PLATELET # BLD AUTO: 105 10E9/L (ref 150–450)
POTASSIUM SERPL-SCNC: 3.3 MMOL/L (ref 3.4–5.3)
POTASSIUM SERPL-SCNC: 3.8 MMOL/L (ref 3.4–5.3)
PROCALCITONIN SERPL-MCNC: NORMAL NG/ML
RBC # BLD AUTO: 4.08 10E12/L (ref 4.4–5.9)
SODIUM SERPL-SCNC: 138 MMOL/L (ref 133–144)
SODIUM SERPL-SCNC: 149 MMOL/L (ref 133–144)
WBC # BLD AUTO: 9.7 10E9/L (ref 4–11)

## 2017-03-02 PROCEDURE — 80048 BASIC METABOLIC PNL TOTAL CA: CPT | Performed by: STUDENT IN AN ORGANIZED HEALTH CARE EDUCATION/TRAINING PROGRAM

## 2017-03-02 PROCEDURE — 20000004 ZZH R&B ICU UMMC

## 2017-03-02 PROCEDURE — 25000125 ZZHC RX 250: Performed by: INTERNAL MEDICINE

## 2017-03-02 PROCEDURE — 84100 ASSAY OF PHOSPHORUS: CPT | Performed by: STUDENT IN AN ORGANIZED HEALTH CARE EDUCATION/TRAINING PROGRAM

## 2017-03-02 PROCEDURE — 25000132 ZZH RX MED GY IP 250 OP 250 PS 637: Performed by: INTERNAL MEDICINE

## 2017-03-02 PROCEDURE — 25000132 ZZH RX MED GY IP 250 OP 250 PS 637: Performed by: STUDENT IN AN ORGANIZED HEALTH CARE EDUCATION/TRAINING PROGRAM

## 2017-03-02 PROCEDURE — 84145 PROCALCITONIN (PCT): CPT | Performed by: STUDENT IN AN ORGANIZED HEALTH CARE EDUCATION/TRAINING PROGRAM

## 2017-03-02 PROCEDURE — 85025 COMPLETE CBC W/AUTO DIFF WBC: CPT | Performed by: STUDENT IN AN ORGANIZED HEALTH CARE EDUCATION/TRAINING PROGRAM

## 2017-03-02 PROCEDURE — 99291 CRITICAL CARE FIRST HOUR: CPT | Mod: GC | Performed by: INTERNAL MEDICINE

## 2017-03-02 PROCEDURE — 83735 ASSAY OF MAGNESIUM: CPT | Performed by: STUDENT IN AN ORGANIZED HEALTH CARE EDUCATION/TRAINING PROGRAM

## 2017-03-02 PROCEDURE — 36415 COLL VENOUS BLD VENIPUNCTURE: CPT | Performed by: STUDENT IN AN ORGANIZED HEALTH CARE EDUCATION/TRAINING PROGRAM

## 2017-03-02 RX ORDER — VALPROIC ACID 250 MG/1
500 CAPSULE, LIQUID FILLED ORAL AT BEDTIME
Status: COMPLETED | OUTPATIENT
Start: 2017-03-02 | End: 2017-03-03

## 2017-03-02 RX ORDER — IBUPROFEN 200 MG
200 TABLET ORAL EVERY 8 HOURS PRN
Status: DISCONTINUED | OUTPATIENT
Start: 2017-03-02 | End: 2017-03-05 | Stop reason: HOSPADM

## 2017-03-02 RX ORDER — FOLIC ACID 1 MG/1
1 TABLET ORAL DAILY
Status: DISCONTINUED | OUTPATIENT
Start: 2017-03-02 | End: 2017-03-05 | Stop reason: HOSPADM

## 2017-03-02 RX ORDER — MULTIVITAMIN,THERAPEUTIC
1 TABLET ORAL DAILY
Status: DISCONTINUED | OUTPATIENT
Start: 2017-03-02 | End: 2017-03-05 | Stop reason: HOSPADM

## 2017-03-02 RX ORDER — GABAPENTIN 600 MG/1
600 TABLET ORAL 3 TIMES DAILY
Status: DISCONTINUED | OUTPATIENT
Start: 2017-03-02 | End: 2017-03-03

## 2017-03-02 RX ORDER — ACETAMINOPHEN 325 MG/1
650 TABLET ORAL EVERY 8 HOURS PRN
Status: DISCONTINUED | OUTPATIENT
Start: 2017-03-02 | End: 2017-03-05 | Stop reason: HOSPADM

## 2017-03-02 RX ORDER — VALPROIC ACID 250 MG/1
250 CAPSULE, LIQUID FILLED ORAL ONCE
Status: COMPLETED | OUTPATIENT
Start: 2017-03-02 | End: 2017-03-02

## 2017-03-02 RX ORDER — VALPROIC ACID 250 MG/1
500 CAPSULE, LIQUID FILLED ORAL AT BEDTIME
Status: DISCONTINUED | OUTPATIENT
Start: 2017-03-03 | End: 2017-03-04

## 2017-03-02 RX ORDER — LANOLIN ALCOHOL/MO/W.PET/CERES
100 CREAM (GRAM) TOPICAL DAILY
Status: DISCONTINUED | OUTPATIENT
Start: 2017-03-02 | End: 2017-03-05 | Stop reason: HOSPADM

## 2017-03-02 RX ORDER — MAGNESIUM SULFATE HEPTAHYDRATE 40 MG/ML
4 INJECTION, SOLUTION INTRAVENOUS EVERY 4 HOURS PRN
Status: DISCONTINUED | OUTPATIENT
Start: 2017-03-02 | End: 2017-03-05 | Stop reason: HOSPADM

## 2017-03-02 RX ORDER — CLONIDINE HYDROCHLORIDE 0.2 MG/1
0.2 TABLET ORAL EVERY 8 HOURS
Status: COMPLETED | OUTPATIENT
Start: 2017-03-02 | End: 2017-03-03

## 2017-03-02 RX ORDER — VALPROIC ACID 250 MG/1
250 CAPSULE, LIQUID FILLED ORAL DAILY
Status: DISCONTINUED | OUTPATIENT
Start: 2017-03-03 | End: 2017-03-04

## 2017-03-02 RX ADMIN — ACETAMINOPHEN 650 MG: 325 TABLET, FILM COATED ORAL at 16:50

## 2017-03-02 RX ADMIN — CLONIDINE HYDROCHLORIDE 0.2 MG: 0.2 TABLET ORAL at 20:29

## 2017-03-02 RX ADMIN — CLONIDINE 2 PATCH: 0.2 PATCH TRANSDERMAL at 13:46

## 2017-03-02 RX ADMIN — ACETAMINOPHEN 650 MG: 325 TABLET, FILM COATED ORAL at 20:30

## 2017-03-02 RX ADMIN — Medication 100 MG: at 09:04

## 2017-03-02 RX ADMIN — Medication 2 G: at 16:51

## 2017-03-02 RX ADMIN — FOLIC ACID 1 MG: 1 TABLET ORAL at 09:04

## 2017-03-02 RX ADMIN — SODIUM PHOSPHATE, MONOBASIC, MONOHYDRATE AND SODIUM PHOSPHATE, DIBASIC, ANHYDROUS 20 MMOL: 276; 142 INJECTION, SOLUTION INTRAVENOUS at 18:11

## 2017-03-02 RX ADMIN — GABAPENTIN 600 MG: 600 TABLET, FILM COATED ORAL at 20:29

## 2017-03-02 RX ADMIN — DIAZEPAM 5 MG: 5 TABLET ORAL at 09:04

## 2017-03-02 RX ADMIN — DIAZEPAM 10 MG: 5 TABLET ORAL at 10:57

## 2017-03-02 RX ADMIN — THERA TABS 1 TABLET: TAB at 09:04

## 2017-03-02 RX ADMIN — VALPROIC ACID 250 MG: 250 CAPSULE, LIQUID FILLED ORAL at 13:48

## 2017-03-02 RX ADMIN — CLONIDINE HYDROCHLORIDE 0.2 MG: 0.2 TABLET ORAL at 13:48

## 2017-03-02 RX ADMIN — GABAPENTIN 600 MG: 600 TABLET, FILM COATED ORAL at 13:49

## 2017-03-02 ASSESSMENT — ACTIVITIES OF DAILY LIVING (ADL)
SWALLOWING: 0-->SWALLOWS FOODS/LIQUIDS WITHOUT DIFFICULTY
COGNITION: 0 - NO COGNITION ISSUES REPORTED
RETIRED_COMMUNICATION: 0-->UNDERSTANDS/COMMUNICATES WITHOUT DIFFICULTY
BATHING: 0-->INDEPENDENT
FALL_HISTORY_WITHIN_LAST_SIX_MONTHS: NO
TOILETING: 0-->INDEPENDENT
TRANSFERRING: 0-->INDEPENDENT
RETIRED_EATING: 0-->INDEPENDENT
AMBULATION: 0-->INDEPENDENT
DRESS: 0-->INDEPENDENT

## 2017-03-02 ASSESSMENT — PAIN DESCRIPTION - DESCRIPTORS: DESCRIPTORS: ACHING

## 2017-03-02 NOTE — PLAN OF CARE
Problem: Goal Outcome Summary  Goal: Goal Outcome Summary  Outcome: No Change  D: Acute alcohol intoxication, unresponsive.   A/I: VSS. 7.5 ETT/mechanical vent. SpO2 100% on 40% fiO2. On transfer, was restless and attempting to pull lines- had existing mitts & bilat wrist restraints in place from ED- new order for bilat wrist restraints obtained from Dr. Asif. Otherwise, pt has sustained unresponsiveness; does not respond to vigorous stim or pain. GCS 3. Propofol stopped with goal of weaning sedation for extubation- patient has remained neurologically unchanged since prop shut off. MIVF @ 125 mL/hr +  D5/NS/thiamine/folate @ 100 mL/hr. BPs soft (80s-90s systolic) with MAP 70-80. Chaidez patent with adequate UOP. OG clamped.   P: Continue to monitor hemodyamics, neurologic status, agitation. Goal to extubate on awakening depending on pt tolerance.

## 2017-03-02 NOTE — PROGRESS NOTES
"SPIRITUAL HEALTH SERVICES  [facility] ([place] 4C  ON-CALL VISIT     REFERRAL SOURCE: Initial unit  visit with ptd, per request for hospital  visit as noted in initial nursing assessment.     Pt's [employer] tradition was not noted in Epic; I confirmed that pt is Religion. Pt welcomed  support, talked about: his long struggle with alcohol abuse his desire for treatment and \"to get my life together; it's completely out of control.\" His losses: \"my girlfriend was murdered.\" looking forward to talking with  about his housing   I shared prayer and blessing with pt.      PLAN:   will follow up .     Will Box) Marcia Barron M.Div., [facility]  Staff   Pager [phone]                                                                                         "

## 2017-03-02 NOTE — PROGRESS NOTES
Transfer accept note:    Patient currently doing ok. Has some sore throat and little discomfort in his upper chest when he coughs. Otherwise not complaints. He expressed that he wants to get treatment for his alcohol and is considering talking with a therapist about his issues.     Overnight will continue MSSA protocol, gabapentin, depakote, and clonidine for alcohol withdrawal. SW to see patient tomorrow    Please refer to daily MICU progress note for additional details    Miguel Gracia MD  PGY2  Pager: [phone]

## 2017-03-02 NOTE — H&P
MICU History and Physical      Patient Name: Anirudh Motley MRN# [id number]   Age: 46 year old YOB: 1970     Date of Admission:3/1/2017  Primary care provider: Devora [facility]  Date of Service: 3/1/2017  Admitting Team: MICU         Assessment and Plan:   Anirudh Motley is a 46 year old male with history of alcohol abuse, 3 previous admissions for intoxication and seizures who presents to the ICU s/p intubation for airway protection.     NEURO/PSYCH  # Sedation  # Intoxication  # History of alcohol withdrawal seizures  Given ativan and started on propofol in the ED.  Discontinued propofol on arrival to the floor, currently not on any sedating medications.  Remains intoxicated and unresponsive.  No acute intracranial pathology noted on CT done in ED.  Low suspicion for alcohol withdrawal given recent drinking and elevated BAL, however on chart review patient has history of alcohol withdrawal seizures.  Will hold keppra as he was intended to wean off this last year (and was taking as prophylaxis for withdrawal seizures).  - On MSSA protocol    PULMONARY  # Hypoxia  Noted to be desaturating in ED, likely due to intoxication and inability to protect airway.  ABG following intubation normal.  Likely able to extubate once mental status improves.  - Vent AC RR 12 PEEP 5  FiO2 40%    CARDIAC   No active issues.    RENAL  # Hypokalemia  Noted to be 3.1 in the ED.  Will recheck and start electrolyte repletion protocol.  - F/u K  - Potassium repletion protocol    Intake/Output Summary (Last 24 hours) at 03/01/17 1921  Last data filed at 03/01/17 1900   Gross per 24 hour   Intake          1079.59 ml   Output              175 ml   Net           904.59 ml       INFECTIOUS DISEASE  # Concern for aspiration  Noted in ED on CXR, sputumcultures pending.  Patient has been afebrile with normal WBC since arrival to the hospital.  Low concern for infection at this time but will check procal/AM  CBC.  - F/u sputum cultures  - F/u procalcitonin    GASTROINTESTINAL/GENITOURINARY  No active issues.    ENDOCRINE  No active issues.    HEMATOLOGIC  No active issues.    SKIN/MSK  No active issues.    FEN: NPO  PPX: SCD  Code status: FULL  Dispo: Admit to ICU for management of vent    Patient seen and discussed with Dr. Wiley who agrees with the above assessment and plan.           Chief Complaint:   Intoxication         HPI:   Patient is intubated and sedated/intoxicated; history is obtained via chart review.  45 yo m w/hx alcohol abuse and 3x admissions for intoxication who was found unconscious and minimally responsive.  Strong odor of alcohol was noted.  On arrival to the ED the patient was found to have BAL 0.5.  He was placed on a monitor with a nasal trumpet; after 2 hours he was noted to have progressive decrease in O2 sats despite airway opening, prompting intubation.  The patient was given ativan; ABG following intubation was normal.    On review of care everywhere patient was seen 7/16 by his PCP and was intended to wean keppra at that time.  Unclear if he should still be on this medication, only noted to have history of withdrawal seizures.         Past Medical History:     Past Medical History   Diagnosis Date     Alcohol dependence (H)      lives in wet house.  Per family has had seizures associated with EtOH in past     Anxiety      Depression             Past Surgical History:   No past surgical history on file.           Social History:     Social History     Social History     Marital status: Single     Spouse name: N/A     Number of children: N/A     Years of education: N/A     Occupational History     Not on file.     Social History Main Topics     Smoking status: Never Smoker     Smokeless tobacco: Not on file     Alcohol use Yes     Drug use: No     Sexual activity: Not on file     Other Topics Concern     Not on file     Social History Narrative     No narrative on file            Family  History:   No family history on file.         Immunizations:     There is no immunization history on file for this patient.           Allergies:      Allergies   Allergen Reactions     Penicillins Hives and Rash            Medications:     Prior to Admission Medications   Prescriptions Last Dose Informant Patient Reported? Taking?   LevETIRAcetam (KEPPRA PO) Verified, pt has rx bottle with him at Unknown time  Yes Yes   Sig: Take 1,500 mg by mouth 2 times daily   folic acid (FOLVITE) 1 MG tablet Unknown at Unknown time  No No   Sig: Take 1 tablet by mouth daily.   multivitamin, therapeutic with minerals (THERA-VIT-M) TABS Unknown at Unknown time  No No   Sig: Take 1 tablet by mouth daily.   thiamine 100 MG tablet Verified, pt has rx bottle with him at Unknown time  No Yes   Sig: Take 1 tablet by mouth daily.      Facility-Administered Medications: None             Review of Systems:   A complete ROS was performed and is negative other than what is stated in the HPI.         Physical Exam:   Blood pressure 113/79, temperature 97.4  F (36.3  C), temperature source Axillary, resp. rate 16, weight 103.2 kg (227 lb 8.2 oz), SpO2 100 %.  General: Lying in bed, intubated/sedated  Neck: JVD not elevated, supple  Chest/Resp: Mechanical breath sounds bilaterally, otherwise clear to auscultation  Heart/CV: RRR, no murmurs/rubs/gallops  Abdomen/GI: Non-distended, normoactive bowel sounds, no splenomegaly appreciated  Extremities/MSK: No peripheral edema  Skin: No rash appreciated  Neuro: Intubated/sedated    Tubes/Lines/Devices:  Peripheral IV 03/01/17 Right Lower forearm (Active)   Site Assessment [facility] 3/1/2017  4:00 PM   Line Status Infusing 3/1/2017  4:00 PM   Phlebitis Scale 0-->no symptoms 3/1/2017  4:00 PM   Infiltration Scale 0 3/1/2017  4:00 PM   Number of days:0       Peripheral IV 03/01/17 Left Upper forearm (Active)   Site Assessment [facility] 3/1/2017  4:00 PM   Line Status Saline locked 3/1/2017  4:00 PM   Phlebitis Scale  0-->no symptoms 3/1/2017  4:00 PM   Infiltration Scale 0 3/1/2017  4:00 PM   Number of days:0              Data:   ROUTINE ICU LABS (Last four results)  CMP  Recent Labs  Lab 03/01/17  0805      POTASSIUM 3.1*   CHLORIDE 109   CO2 22   ANIONGAP 13   *   BUN 12   CR 0.68   GFRESTIMATED >90Non  GFR Calc   GFRESTBLACK >90African American GFR Calc   JT 8.1*   MAG 1.9   PROTTOTAL 7.2   ALBUMIN 4.2   BILITOTAL 0.3   ALKPHOS 89   AST 58*   ALT 59     CBC  Recent Labs  Lab 03/01/17  0805   WBC 8.9   RBC 4.30*   HGB 13.9   HCT 40.1   MCV 93   MCH 32.3   MCHC 34.7   RDW 14.9   *     INR  Recent Labs  Lab 03/01/17  0955 03/01/17  0805   INR 0.97 Unsatisfactory specimen - tube underfilledRACHE WELLS 0950 3/1/17 BY DL     Arterial Blood Gas  Recent Labs  Lab 03/01/17  1347   PH 7.35   PCO2 36   PO2 168*   HCO3 20*   O2PER 60.0     Venous Blood Gas   Recent Labs  Lab 03/01/17  1347   O2PER 60.0         Results for orders placed or performed during the hospital encounter of 03/01/17   Chest  XR, 1 view portable    Narrative    Exam: XR CHEST PORT 1 VW, 3/1/2017 11:17 AM    Indication: Intubation: History of alcohol abuse suspected  intoxication.    Comparison: 4/16/2014;    Findings:   AP view of the chest. Endotracheal tube projecting about 3.5 cm above  the ollie. Low lung volumes. Left cardiophrenic angle is not  completely included. No pleural effusion or pneumothorax. Bibasilar  opacities likely representing basilar atelectasis. Cardiac silhouette  is prominent, possibly due to low lung volume      Impression    Impression:   1. Endotracheal tube projecting about 3.5 cm above the ollie.  2. Bibasilar opacities, may represent atelectasis with low lung  volumes. Additional consideration includes aspiration or mild  pulmonary edema.  3. Prominent cardiac silhouette which may be secondary to low lung  volumes and position.    I have personally reviewed the examination and initial  interpretation  and I agree with the findings.    ARJUN MACIEL MD   Head CT w/o contrast    Narrative    CT HEAD W/O CONTRAST 3/1/2017 12:27 PM    Provided History: Unresponsive, rule out bleed    Comparison: 4/15/2014 dated head CT.    Technique: Using multidetector thin collimation helical acquisition  technique, axial, coronal and sagittal CT images from the skull base  to the vertex were obtained without intravenous contrast.     Findings:    No intracranial hemorrhage, mass effect, or midline shift. The  ventricles are proportionate to the cerebral sulci. The gray to white  matter differentiation of the cerebral hemispheres is preserved. The  basal cisterns are patent.    The visualized paranasal sinuses are clear. The mastoid air cells are  clear.       Impression    Impression: No acute intracranial pathology.    MD Augustus JOSE MD  Internal Medicine PGY-1  [phone]      This critically ill patient was seen and examined with the resident physician.  I have reviewed the above note and agree with the findings, assessment, and plan as documented.  The plan was formulated in conjunction with the house staff.  I personally examined and evaluated the patient today.  I have evaluated all laboratory values and imaging studies for the past 24 hours.  A brief overview, key findings and key decisions made today include the following:  Adult male with acute alcohol intoxication. Wean sedation and plan for possible extubation. He will be high risk for alcohol withdrawal.   I have reviewed all the consults that have been ordered and are active for this patient.  Pertinent procedures / interventions / care plan changes include:  extubate    Critical Care Time: 35 min.  I spent this time (excluding procedures) personally providing and directing critical care services at the bedside and on the critical care unit.      Jace Wiley MD  Pulmonary and Critical Care  [facility]  [place]  Pager:  [phone]

## 2017-03-02 NOTE — PLAN OF CARE
Problem: Goal Outcome Summary  Goal: Goal Outcome Summary  Assumed patient care at apporx 1900hrs, with VSS. Noted to be intub with 7.5 ETT/mechanical vent. SpO2 100% on 40% fiO2. Occasional boughts of restless and attempting to pull lines/ETT- had existing mitts & bilat wrist restraints in place, occasionally follow commands, not on sedation. Propofol had been stopped since transfer,  with goal of weaning MV for  extubation- at approx 2300hrs, pt was placed on SBT and at apporx 30mins later, self extubated. Placed on 2L NC with sat's in mid 90's, RR WNL, with no s/s of distress. MIVF 125 mL/hr stopped, 100 mL/hr of Bananna Bag completed at 0500hrs. Noted to be HTN with sys  140-160 and MAPs in mid 100's, advised Dr Fernandez.. Chaidez patent with greater than adequate UOP, removed at patient's request at approx 0500hrs.. OG clamped removed with ETT self removal, on regular diet, margaux a transitional diet of clr liq to full diet. As per pt, when interviewed by Dr. Fernandez, s/p self extubation, did endorse use of etoh as of two days ago and withdrawal as well as having siezure activity as early as last week. POC is to monitor pt, on MSSA scale for pending withdrawal.  Continue to monitor hemodyamics, neurologic status, agitation. Goal now is to possible transfer to floor.

## 2017-03-03 LAB
ANION GAP SERPL CALCULATED.3IONS-SCNC: 8 MMOL/L (ref 3–14)
BUN SERPL-MCNC: 12 MG/DL (ref 7–30)
CALCIUM SERPL-MCNC: 8 MG/DL (ref 8.5–10.1)
CHLORIDE SERPL-SCNC: 105 MMOL/L (ref 94–109)
CO2 SERPL-SCNC: 26 MMOL/L (ref 20–32)
CREAT SERPL-MCNC: 0.75 MG/DL (ref 0.66–1.25)
ERYTHROCYTE [DISTWIDTH] IN BLOOD BY AUTOMATED COUNT: 14.5 % (ref 10–15)
GFR SERPL CREATININE-BSD FRML MDRD: ABNORMAL ML/MIN/1.7M2
GLUCOSE BLDC GLUCOMTR-MCNC: 100 MG/DL (ref 70–99)
GLUCOSE BLDC GLUCOMTR-MCNC: 131 MG/DL (ref 70–99)
GLUCOSE BLDC GLUCOMTR-MCNC: 139 MG/DL (ref 70–99)
GLUCOSE SERPL-MCNC: 158 MG/DL (ref 70–99)
HCT VFR BLD AUTO: 36.1 % (ref 40–53)
HGB BLD-MCNC: 12.5 G/DL (ref 13.3–17.7)
MAGNESIUM SERPL-MCNC: 2.2 MG/DL (ref 1.6–2.3)
MCH RBC QN AUTO: 32.1 PG (ref 26.5–33)
MCHC RBC AUTO-ENTMCNC: 34.6 G/DL (ref 31.5–36.5)
MCV RBC AUTO: 93 FL (ref 78–100)
PHOSPHATE SERPL-MCNC: 2.3 MG/DL (ref 2.5–4.5)
PLATELET # BLD AUTO: 99 10E9/L (ref 150–450)
POTASSIUM SERPL-SCNC: 3.8 MMOL/L (ref 3.4–5.3)
RBC # BLD AUTO: 3.9 10E12/L (ref 4.4–5.9)
SODIUM SERPL-SCNC: 139 MMOL/L (ref 133–144)
WBC # BLD AUTO: 4.4 10E9/L (ref 4–11)

## 2017-03-03 PROCEDURE — 99233 SBSQ HOSP IP/OBS HIGH 50: CPT | Mod: GC | Performed by: INTERNAL MEDICINE

## 2017-03-03 PROCEDURE — 25000132 ZZH RX MED GY IP 250 OP 250 PS 637: Performed by: INTERNAL MEDICINE

## 2017-03-03 PROCEDURE — 80048 BASIC METABOLIC PNL TOTAL CA: CPT | Performed by: INTERNAL MEDICINE

## 2017-03-03 PROCEDURE — 25000125 ZZHC RX 250: Performed by: INTERNAL MEDICINE

## 2017-03-03 PROCEDURE — 25000132 ZZH RX MED GY IP 250 OP 250 PS 637: Performed by: STUDENT IN AN ORGANIZED HEALTH CARE EDUCATION/TRAINING PROGRAM

## 2017-03-03 PROCEDURE — 83735 ASSAY OF MAGNESIUM: CPT | Performed by: INTERNAL MEDICINE

## 2017-03-03 PROCEDURE — 85027 COMPLETE CBC AUTOMATED: CPT | Performed by: INTERNAL MEDICINE

## 2017-03-03 PROCEDURE — 40000894 ZZH STATISTIC OT IP EVAL DEFER: Performed by: OCCUPATIONAL THERAPIST

## 2017-03-03 PROCEDURE — 12000006 ZZH R&B IMCU INTERMEDIATE UMMC

## 2017-03-03 PROCEDURE — 84100 ASSAY OF PHOSPHORUS: CPT | Performed by: INTERNAL MEDICINE

## 2017-03-03 PROCEDURE — 00000146 ZZHCL STATISTIC GLUCOSE BY METER IP

## 2017-03-03 PROCEDURE — 36415 COLL VENOUS BLD VENIPUNCTURE: CPT | Performed by: INTERNAL MEDICINE

## 2017-03-03 PROCEDURE — 40000893 ZZH STATISTIC PT IP EVAL DEFER

## 2017-03-03 RX ORDER — GABAPENTIN 800 MG/1
400 TABLET ORAL 3 TIMES DAILY
Status: DISCONTINUED | OUTPATIENT
Start: 2017-03-03 | End: 2017-03-03

## 2017-03-03 RX ORDER — GABAPENTIN 400 MG/1
400 CAPSULE ORAL 3 TIMES DAILY
Status: DISCONTINUED | OUTPATIENT
Start: 2017-03-03 | End: 2017-03-04

## 2017-03-03 RX ORDER — GABAPENTIN 400 MG/1
400 CAPSULE ORAL 3 TIMES DAILY
Status: DISCONTINUED | OUTPATIENT
Start: 2017-03-03 | End: 2017-03-03

## 2017-03-03 RX ORDER — HYDRALAZINE HYDROCHLORIDE 20 MG/ML
10 INJECTION INTRAMUSCULAR; INTRAVENOUS EVERY 6 HOURS PRN
Status: DISCONTINUED | OUTPATIENT
Start: 2017-03-03 | End: 2017-03-05 | Stop reason: HOSPADM

## 2017-03-03 RX ORDER — CLONIDINE HYDROCHLORIDE 0.1 MG/1
0.2 TABLET ORAL EVERY 8 HOURS PRN
Status: DISCONTINUED | OUTPATIENT
Start: 2017-03-03 | End: 2017-03-03

## 2017-03-03 RX ADMIN — GABAPENTIN 400 MG: 400 CAPSULE ORAL at 18:24

## 2017-03-03 RX ADMIN — VALPROIC ACID 500 MG: 250 CAPSULE, LIQUID FILLED ORAL at 00:04

## 2017-03-03 RX ADMIN — VALPROIC ACID 250 MG: 250 CAPSULE, LIQUID FILLED ORAL at 10:13

## 2017-03-03 RX ADMIN — HYDRALAZINE HYDROCHLORIDE 10 MG: 20 INJECTION INTRAMUSCULAR; INTRAVENOUS at 22:23

## 2017-03-03 RX ADMIN — SODIUM PHOSPHATE, MONOBASIC, MONOHYDRATE AND SODIUM PHOSPHATE, DIBASIC, ANHYDROUS 15 MMOL: 276; 142 INJECTION, SOLUTION INTRAVENOUS at 17:04

## 2017-03-03 RX ADMIN — IBUPROFEN 200 MG: 200 TABLET, FILM COATED ORAL at 15:32

## 2017-03-03 RX ADMIN — DIAZEPAM 5 MG: 5 TABLET ORAL at 21:18

## 2017-03-03 RX ADMIN — IBUPROFEN 200 MG: 200 TABLET, FILM COATED ORAL at 06:18

## 2017-03-03 RX ADMIN — CLONIDINE HYDROCHLORIDE 0.2 MG: 0.1 TABLET ORAL at 11:32

## 2017-03-03 RX ADMIN — GABAPENTIN 600 MG: 600 TABLET, FILM COATED ORAL at 08:56

## 2017-03-03 RX ADMIN — GABAPENTIN 400 MG: 400 CAPSULE ORAL at 19:39

## 2017-03-03 RX ADMIN — DIAZEPAM 5 MG: 5 TABLET ORAL at 18:24

## 2017-03-03 RX ADMIN — FOLIC ACID 1 MG: 1 TABLET ORAL at 08:56

## 2017-03-03 RX ADMIN — CLONIDINE HYDROCHLORIDE 0.2 MG: 0.2 TABLET ORAL at 06:18

## 2017-03-03 RX ADMIN — THERA TABS 1 TABLET: TAB at 08:56

## 2017-03-03 RX ADMIN — ACETAMINOPHEN 650 MG: 325 TABLET, FILM COATED ORAL at 11:18

## 2017-03-03 RX ADMIN — VALPROIC ACID 500 MG: 250 CAPSULE, LIQUID FILLED ORAL at 21:46

## 2017-03-03 RX ADMIN — Medication 100 MG: at 08:56

## 2017-03-03 NOTE — PLAN OF CARE
Problem: Goal Outcome Summary  Goal: Goal Outcome Summary  PT 4C: Defer - PT orders received and acknowledged. Per chart review and discussion with OT, pt with no acute PT needs at this time. IND-SBA with mobility and functional dynamic balance tasks. Will complete PT orders and sign off service. Please re-order if new needs arise.

## 2017-03-03 NOTE — PLAN OF CARE
Problem: Goal Outcome Summary  Goal: Goal Outcome Summary  Outcome: No Change  Pt transfers from MICU around 11am. A &O x3. Hypertensive on arrival to floor 150-160/100's. SB 57-58. RA.Team notified. Gave prn clonidine. C/O right shoulder pain and headache, not due for ibuprofen, got tylenol instead. Slept after receiving meds. MSSA score 1. Needs phos. Wants to take a shower tonight.

## 2017-03-03 NOTE — PLAN OF CARE
Problem: Goal Outcome Summary  Goal: Goal Outcome Summary  OT: 4C: OT orders received, reviewed and completed. Per chart review and discussion/observation w/ pt, no inpt OT needs at this time. Pt demonstrated ind supine > EOB from flat bed, sit > stand, standing reaching to  cup from floor and ambulation to sit in chair ind. OT eval cx and orders completed.

## 2017-03-03 NOTE — PLAN OF CARE
Problem: Goal Outcome Summary  Goal: Goal Outcome Summary  Outcome: Improving  Pt alert and oriented to place and situation. Pt ate 100% of breakfast. Vitals stable, and declined pain. Pt able to walk to bathroom with stand by assist, with 1BM. Pt transferred to , report given to ROSINA Molina.

## 2017-03-03 NOTE — PROGRESS NOTES
[facility] Service - Internal Medicine Resident Progress Note  Date of Service: 03/03/2017    Patient: Anirudh Motley  MRN: [id number]  Admission Date: 3/1/2017  Hospital Day # 2    Assessment & Plan:  Mr. Motley is a 46 year old male with PMHx of EtOH abuse notable for recurrent admissions for acute alcohol intoxication with subsequent withdrawal c/b withdrawal seizures. Recently seen in [facility] ED day prior to admission (2/28) after he was brought in by EMS unable to ambulate. Admitted to [facility] 3/1 after again being found intoxicated and unresponsive.      # Acute EtOH Intoxication, resolved  # History of alcohol abuse  # History of alcohol withdrawal seizures  Longstanding hx of EtOH abuse punctuated with numerous admissions for intoxication, withdrawal, hx of withdrawal seizures. Hospitalized at [facility] for PTA seizure 2/23-2/25, sz attributed to EtOH vs medication non-compliance (Keppra); although Keppra initially prescribed in setting of EtOH withdrawal seizure. Subsequently seen in [facility] ED day prior to admission (2/28). Admitted to [facility] 3/1 after again being found intoxicated and unresponsive. Intubated for airway protection, then patient self-extubated shortly thereafter. Currently dose not look to be withdrawing with low CIWA scores, however we are giving him clonidine and gabapentin to prevent withdrawal.   - Reduce clonidine to 0.2mg/24 hour patch, 0.2mg clonidine PRN  - Reduce gabapentin to 400mg TID  - Continue valproic acid 250mg qam, 500mg qhs; for seizure prophylaxis with withdrawals  - Continue MSSA protocol with BZDs prn (Diazepam ordered)   - Continue thiamine, folic acid, MVT  -  consult to address chem dep and homelessness. Has been through inpatient chem dep before  - Psychology f/u as an outpatient    # Acute hypoxemic respiratory failure with concern for aspiration pneumonitis, resolved  Initial concern that pt may have aspirated given bi-basilar opacities on  admission CXR. However, pt self extubated to RA, WBC, pro-calcitonin all WNL. Endotracheal sputum culture with moderate growth beta-hemolytic strep B, however patient clinically does not have pneumonia.  - Monitor for respiratory decline, fevers  - Culture if spikes fever    CODE: full  DVT: low risk, ambulate  FEN: no IV fluids, replace lytes as needed, regular diet  Disposition: maroon 5 for monitoring of alcohol withdrawal. Likely here for 1-3 days    Patient staffed with Dr. Allen who agrees with above plan    Miguel Gracia MD  PGY2  Pager: [phone]    ___________________________________________________________________    Subjective & Interval Hx:    Slept well last night. Feeling better overall. Denies tremors, anxiety, diaphoresis. No dyspnea. No fevers/chills    Last 24 hr care team notes reviewed.   ROS:  4 point ROS including Respiratory, CV, GI and , other than that noted in the HPI, is negative    Medications:  Reviewed in EPIC. List below for reference    Physical Exam:  Blood pressure (!) 162/108, temperature 97.7  F (36.5  C), temperature source Oral, resp. rate 16, weight 103.2 kg (227 lb 8.2 oz), SpO2 98 %.    I/O last 3 completed shifts:  In: 240 [P.O.:240]  Out: 1525 [Urine:1525]    General: alert and no distress  Head: NC/AT  Eyes: non-icteric sclera, EOMI  Pulmonary: CTAB, no increased work of breathing  CV: RRR, +s1/s2  GI: soft, non-tender, obese-appearing, + bowel sounds  Skin: no rashes seen on limited exam  EXT: no edema, WWP  Neuro: A&Ox3, no focal deficits    Labs & Studies of Note:   Labs and studies reviewed in [facility]    Unresulted Labs Ordered in the Past 30 Days of this Admission     Date and Time Order Name Status Description    3/1/2017 1202 Sputum Culture Aerobic Bacterial Preliminary           Medications list for Reference:   Current Facility-Administered Medications   Medication     gabapentin (NEURONTIN) tablet 400 mg     cloNIDine (CATAPRES) tablet 0.2 mg     cloNIDine  (CATAPRES-TTS2) 0.2 MG/24HR WK patch 1 patch     [START ON 3/9/2017] cloNIDine (CATAPRES-TTS) patch REMOVAL     cloNIDine (CATAPRES-TTS) Patch in Place     thiamine tablet 100 mg     multivitamin, therapeutic (THERA-VIT) tablet 1 tablet     folic acid (FOLVITE) tablet 1 mg     influenza quadrivalent (PF) vacc age 3 yrs and older (FLUZONE or Flulaval) injection 0.5 mL     valproic acid (DEPAKENE) capsule 250 mg     valproic acid (DEPAKENE) capsule 500 mg     magnesium sulfate 2 g in NS intermittent infusion ([facility] or [facility] Cmpd)     magnesium sulfate 4 g in 100 mL sterile water (premade)     sodium phosphate 10 mmol in D5W intermittent infusion     sodium phosphate 15 mmol in D5W intermittent infusion     sodium phosphate 20 mmol in D5W intermittent infusion     sodium phosphate 25 mmol in D5W intermittent infusion     acetaminophen (TYLENOL) tablet 650 mg     benzocaine-menthol (CHLORASEPTIC) 6-10 MG lozenge 1 lozenge     ibuprofen (ADVIL/MOTRIN) tablet 200 mg     naloxone (NARCAN) injection 0.1-0.4 mg     potassium chloride SA (K-DUR/KLOR-CON M) CR tablet 20-40 mEq     potassium chloride (KLOR-CON) Packet 20-40 mEq     potassium chloride 10 mEq in 100 mL intermittent infusion     potassium chloride 10 mEq in 100 mL intermittent infusion with 10 mg lidocaine     potassium chloride 20 mEq in 50 mL intermittent infusion     diazepam (VALIUM) tablet 5-20 mg

## 2017-03-03 NOTE — PROGRESS NOTES
Brief Social Work Note    SW attempted to see pt for consult for homelessness, community resources and ETOH/chemical dependency resources.  Pt was sleeping soundly and was hard to wake.  SW will add patient to weekend list to complete consult.      CARY Montez, APSW  6[address] Unit   Pager: [phone]  Phone: [phone]

## 2017-03-03 NOTE — PROGRESS NOTES
[facility]      MICU Progress Note  Anirudh Motley MRN: [id number]  1970  Date of Admission:3/1/2017  Primary care provider: Devora Hudson [facility]      Assessment and Plan:     Mr. Motley is a 46 year old male with pmhx EtOH Abuse notable for recurrent admissions for acute alcohol intoxication with subsequent withdrawal c/b withdrawal seizures. Recently seen in [facility] ED day prior to admission (2/28) after he was brought in by EMS unable to ambulate.  Prior to that hospitalized at [facility] for PTA seizure, attributed to EtOH vs medication non-compliance (Keppra); although Keppra initially prescribed in setting of EtOH w/drawal seizure. Admitted to [facility] 3/1 after again being found intoxicated and unresponsive.     PLAN:  Today:  -- Self-extubated overnight, now awake, alert, conversing easily on RA  -- Initiated Clonidine 0.4mg patches + PO Clonidine 0.2mg q8h x3 doses; to be continued through withdrawal  -- Can remove Clonidine patches as withdrawal progresses as BPs tolerate  -- Initiated Gabapentin 600mg TID; to be continued through withdrawal   -- Initiated Valproic acid 250mg qam, 500mg qhs; to be continued through withdrawal   -- Continue MSSA protocol with BZDs prn (Diazepam ordered)   -- SW consult placed in setting of patient homelessness, consideration of chem dep at time of discharge  -- Transfer to floor (stepdown) for further cares    ===NEURO/PSYCH===  # Acute EtOH Intoxication and Subsequent Withdrawal  # Hx EtOH Withdrawal Seizures  Longstanding hx of EtOH Abuse punctuated with numerous admissions for intoxication, withdrawal, hx of withdrawal seizures.   Hospitalized at [facility] for PTA seizure 2/23-2/25, sz attributed to EtOH vs medication non-compliance (Keppra); although Keppra initially prescribed in setting of EtOH w/drawal seizure. Subsequently seen in [facility] ED day prior to admission (2/28) after he was brought in by EMS unable  to ambulate. Admitted to [facility] 3/1 after again being found intoxicated and unresponsive. Patient reports having been to treatment in the past, cannot remember dates. Is open to discussing further with SW and chem dep potential treatment options after discharge   -- Initiated Clonidine 0.4mg patches + PO Clonidine 0.2mg q8h x3 doses; to be continued through withdrawal  -- Can remove Clonidine patches as withdrawal progresses as BPs tolerate  -- Initiated Gabapentin 600mg TID; to be continued through withdrawal   -- Initiated Valproic acid 250mg qam, 500mg qhs; to be continued through withdrawal   -- Continue MSSA protocol with BZDs prn (Diazepam ordered)   -- Thiamine, Folic acid, MVT  -- SW consult placed in setting of patient homelessness, consideration of chem dep at time of discharg    # Sedation:   All sedation discontinued in setting of self-extubation    ===CARDIOVASCULAR===  # Hypertension:  # Tachycardia:  Patient noted to be hypertensive (150s/90s) and slightly tachycardic (low 100s) this am (3/2) after self-extubation. Suspect d/t beginning of withdrawal.   -- PO and Transdermal Clonidine, Gabapentin, BZDs via MSSA as above     ===PULMONARY===  # Acute Hypoxic Respiratory Failure (resolved):   Patient hypoxic in ED; no ABG done prior to intubation. Suspect d/t intoxication and depressed respiratory drive. Resolved with intubation and metabolization of EtOH. Self-extubated overnight w/o incident. Stable with good oxygenation on RA throughout 3/2.     ===GASTROINTESTINAL===  No active issues    ===RENAL===  # Hypokalemia:   # Hypophosphatemia:  Suspect d/t poor PO intake in setting of ongoing EtOH Abuse  -- Replete prn; lyte protocols ordered  -- Daily BMP     ===HEME/ONC===  No active issues    ===ENDOCRINE===  No active issues    ===INFECTIOUS DISEASE===  # C/f Aspiration Pna:  Initial concern that pt may have aspirated given bi-basilar opacities on admission CXR. However, pt self extubated to RA, WBC,  pro-calcitonin all WNL. Sputum gs notable for mixed gp and gn bacteria, predominance GPCs. Culture this far growing normal oropharyngeal james, suspect will likely continue to be c/w oropharyngeal james. Low suspicion for infection. No indication for abx at this time.   -- F/u 3/1 sputum cultures     ===SKIN/MSK===  No active issues    ===SOCIAL===  # Homelessness:  Pt reports current homelessness. Notes that he does not have wallet, phone. His mother lives in [place] per his report, but when asked if he had anywhere to stay in [place], he responds that he does not. Would like to see a SW about both living situation potential EtOH chem dep treatment following dc from hospital  -- SW consult placed     Prophylaxis:  DVT: ambulatory    GI: None  Disposition: Transfer to floor (stepdown)     Code Status: Full Code    Patient was seen and discussed with attending physician Dr. Wiley, who agrees with above assessment and plan.    Chelsey Rodas MD  Internal Medicine, PGY-2  [phone]    Interval History:     Overnight, patient was becoming more agitated and restless as sedation was being weaned. While in [facility], patient was able to pull breathing tube out with his elbows and self-extubated. All sedation discontinued, patient was awake, alert, following basic commands. No respiratory distress. This am, was becoming more hypertensive, tachycardic, and overall more restless, treated with BZDs from mSSA protocol. No other complaints today, although does note ongoing homelessness and interest in speaking to SW about treatment and living situation.     4 Point Review of systems including CV, Respiratory, GI and  were negative unless otherwise noted.    PHYSICAL EXAM  Temp  Av.1  F (36.2  C)  Min: 95.9  F (35.5  C)  Max: 99  F (37.2  C)      No Data Recorded Resp  Av.3  Min: 10  Max: 24  SpO2  Av.5 %  Min: 75 %  Max: 100 %  FiO2 (%)  Av %  Min: 40 %  Max: 60 %         Intake/Output Summary (Last 24  hours) at 03/02/17 1940  Last data filed at 03/02/17 1800   Gross per 24 hour   Intake             2890 ml   Output             2165 ml   Net              725 ml       GEN: awake, alert, sitting up in bed, appears slightly restless, poor hygiene  EYES: PERRL, Anicteric sclera.   CV: Tachycardic, regular rhythm, no gallops, rubs, or mumurs  PULM: CTAB, symmetric chest rise  GI: normal bowel sounds, non-tender, no rebound tenderness or guarding, no masses  EXTREMITIES: no pedal edema, moving all extremities, peripheral pulses intact  NEURO: no gross abnormalities or focal deficits noted  SKIN: No rashes, sores or ulcerations    DIAGNOSTIC STUDIES  ROUTINE ICU LABS (Last four results)  CMP  Recent Labs  Lab 03/02/17  1557 03/02/17  0109 03/01/17  2129 03/01/17  0805    149*  --  144   POTASSIUM 3.3* 3.8 4.9 3.1*   CHLORIDE 102 117*  --  109   CO2 27 16*  --  22   ANIONGAP 9  --   --  13   * 81  --  118*   BUN 9 7  --  12   CR 0.58* 0.66  --  0.68   GFRESTIMATED >90Non  GFR Calc >90Non  GFR Calc  --  >90Non  GFR Calc   GFRESTBLACK >90African American GFR Calc >90African American GFR Calc  --  >90African American GFR Calc   JT 7.8* 7.3*  --  8.1*   MAG  --  1.7  --  1.9   PHOS  --  1.9*  --   --    PROTTOTAL  --   --   --  7.2   ALBUMIN  --   --   --  4.2   BILITOTAL  --   --   --  0.3   ALKPHOS  --   --   --  89   AST  --   --   --  58*   ALT  --   --   --  59     CBC  Recent Labs  Lab 03/02/17  0109 03/01/17  0805   WBC 9.7 8.9   RBC 4.08* 4.30*   HGB 13.0* 13.9   HCT 39.0* 40.1   MCV 96 93   MCH 31.9 32.3   MCHC 33.3 34.7   RDW 15.1* 14.9   * 124*     INR  Recent Labs  Lab 03/01/17  0955 03/01/17  0805   INR 0.97 Unsatisfactory specimen - tube underfilledRACHE WELLS 0950 3/1/17 BY DL     Arterial Blood Gas  Recent Labs  Lab 03/01/17  1347   PH 7.35   PCO2 36   PO2 168*   HCO3 20*   O2PER 60.0       MICROBIOLOGY  3/1 sputum gs: mixed gp and gn  bacteria, predominance of gpc  3/1 sputum cx: heavy growth normal james to date    IMAGING  No new imaging     This critically ill patient was seen and examined with the resident physician.  I have reviewed the above note and agree with the findings, assessment, and plan as documented.  The plan was formulated in conjunction with the house staff.  I personally examined and evaluated the patient today.  I have evaluated all laboratory values and imaging studies for the past 24 hours.  A brief overview, key findings and key decisions made today include the following:  Adult male admitted with alcohol intoxication. Extubated w/o incident. Currently homeless without lace to discharge. Already experiencing symptoms of alcohol w/d. Will try benzo sparing etoh w/d treatment plan with clonidine, gabapenting, and minimal benzos  I have reviewed all the consults that have been ordered and are active for this patient.  Pertinent procedures / interventions / care plan changes include:  tx of etoh w/d    Critical Care Time: 35 min.  I spent this time (excluding procedures) personally providing and directing critical care services at the bedside and on the critical care unit.      Jace Wiley MD  Pulmonary and Critical Care  [facility]  Pager:  [phone]

## 2017-03-03 NOTE — PLAN OF CARE
Problem: Goal Outcome Summary  Goal: Goal Outcome Summary  Outcome: Improving  Assumed patient care at apporx 1900hrs, with VSS. Pt awake, alert and oriented, scoring a MSSA value of 4, no benzo administration this shift. amb with standby assist. PIV's saline locked. Denies pain.  Goal  is to transfer to floor.

## 2017-03-04 LAB
ALBUMIN SERPL-MCNC: 3.1 G/DL (ref 3.4–5)
ALP SERPL-CCNC: 75 U/L (ref 40–150)
ALT SERPL W P-5'-P-CCNC: 34 U/L (ref 0–70)
ANION GAP SERPL CALCULATED.3IONS-SCNC: 8 MMOL/L (ref 3–14)
AST SERPL W P-5'-P-CCNC: 15 U/L (ref 0–45)
BILIRUB SERPL-MCNC: 0.3 MG/DL (ref 0.2–1.3)
BUN SERPL-MCNC: 13 MG/DL (ref 7–30)
CALCIUM SERPL-MCNC: 8.6 MG/DL (ref 8.5–10.1)
CHLORIDE SERPL-SCNC: 106 MMOL/L (ref 94–109)
CO2 SERPL-SCNC: 25 MMOL/L (ref 20–32)
CREAT SERPL-MCNC: 0.6 MG/DL (ref 0.66–1.25)
ERYTHROCYTE [DISTWIDTH] IN BLOOD BY AUTOMATED COUNT: 14.5 % (ref 10–15)
GFR SERPL CREATININE-BSD FRML MDRD: ABNORMAL ML/MIN/1.7M2
GLUCOSE BLDC GLUCOMTR-MCNC: 101 MG/DL (ref 70–99)
GLUCOSE BLDC GLUCOMTR-MCNC: 113 MG/DL (ref 70–99)
GLUCOSE BLDC GLUCOMTR-MCNC: 113 MG/DL (ref 70–99)
GLUCOSE BLDC GLUCOMTR-MCNC: 187 MG/DL (ref 70–99)
GLUCOSE SERPL-MCNC: 111 MG/DL (ref 70–99)
HCT VFR BLD AUTO: 38.2 % (ref 40–53)
HGB BLD-MCNC: 13 G/DL (ref 13.3–17.7)
MAGNESIUM SERPL-MCNC: 2 MG/DL (ref 1.6–2.3)
MCH RBC QN AUTO: 31.8 PG (ref 26.5–33)
MCHC RBC AUTO-ENTMCNC: 34 G/DL (ref 31.5–36.5)
MCV RBC AUTO: 93 FL (ref 78–100)
PHOSPHATE SERPL-MCNC: 3.3 MG/DL (ref 2.5–4.5)
PLATELET # BLD AUTO: 109 10E9/L (ref 150–450)
POTASSIUM SERPL-SCNC: 3.9 MMOL/L (ref 3.4–5.3)
PROT SERPL-MCNC: 6.5 G/DL (ref 6.8–8.8)
RBC # BLD AUTO: 4.09 10E12/L (ref 4.4–5.9)
SODIUM SERPL-SCNC: 140 MMOL/L (ref 133–144)
WBC # BLD AUTO: 4.9 10E9/L (ref 4–11)

## 2017-03-04 PROCEDURE — 84100 ASSAY OF PHOSPHORUS: CPT | Performed by: INTERNAL MEDICINE

## 2017-03-04 PROCEDURE — 40000141 ZZH STATISTIC PERIPHERAL IV START W/O US GUIDANCE

## 2017-03-04 PROCEDURE — 25000132 ZZH RX MED GY IP 250 OP 250 PS 637: Performed by: INTERNAL MEDICINE

## 2017-03-04 PROCEDURE — 25000125 ZZHC RX 250: Performed by: INTERNAL MEDICINE

## 2017-03-04 PROCEDURE — 99233 SBSQ HOSP IP/OBS HIGH 50: CPT | Mod: GC | Performed by: INTERNAL MEDICINE

## 2017-03-04 PROCEDURE — 85027 COMPLETE CBC AUTOMATED: CPT | Performed by: INTERNAL MEDICINE

## 2017-03-04 PROCEDURE — 36415 COLL VENOUS BLD VENIPUNCTURE: CPT | Performed by: INTERNAL MEDICINE

## 2017-03-04 PROCEDURE — 12000001 ZZH R&B MED SURG/OB UMMC

## 2017-03-04 PROCEDURE — 25000132 ZZH RX MED GY IP 250 OP 250 PS 637: Performed by: STUDENT IN AN ORGANIZED HEALTH CARE EDUCATION/TRAINING PROGRAM

## 2017-03-04 PROCEDURE — 83735 ASSAY OF MAGNESIUM: CPT | Performed by: INTERNAL MEDICINE

## 2017-03-04 PROCEDURE — 80053 COMPREHEN METABOLIC PANEL: CPT | Performed by: INTERNAL MEDICINE

## 2017-03-04 PROCEDURE — 00000146 ZZHCL STATISTIC GLUCOSE BY METER IP

## 2017-03-04 RX ORDER — AZITHROMYCIN 250 MG/1
500 TABLET, FILM COATED ORAL ONCE
Status: COMPLETED | OUTPATIENT
Start: 2017-03-04 | End: 2017-03-04

## 2017-03-04 RX ORDER — LEVETIRACETAM 750 MG/1
1500 TABLET ORAL 2 TIMES DAILY
Status: DISCONTINUED | OUTPATIENT
Start: 2017-03-04 | End: 2017-03-05 | Stop reason: HOSPADM

## 2017-03-04 RX ORDER — GABAPENTIN 300 MG/1
300 CAPSULE ORAL 2 TIMES DAILY
Status: DISCONTINUED | OUTPATIENT
Start: 2017-03-04 | End: 2017-03-05

## 2017-03-04 RX ORDER — AZITHROMYCIN 250 MG/1
250 TABLET, FILM COATED ORAL DAILY
Status: DISCONTINUED | OUTPATIENT
Start: 2017-03-05 | End: 2017-03-05 | Stop reason: HOSPADM

## 2017-03-04 RX ADMIN — LEVETIRACETAM 1500 MG: 750 TABLET, FILM COATED ORAL at 11:33

## 2017-03-04 RX ADMIN — LEVETIRACETAM 1500 MG: 750 TABLET, FILM COATED ORAL at 20:56

## 2017-03-04 RX ADMIN — AZITHROMYCIN 500 MG: 250 TABLET, FILM COATED ORAL at 09:36

## 2017-03-04 RX ADMIN — Medication 100 MG: at 08:19

## 2017-03-04 RX ADMIN — THERA TABS 1 TABLET: TAB at 08:20

## 2017-03-04 RX ADMIN — IBUPROFEN 200 MG: 200 TABLET, FILM COATED ORAL at 03:28

## 2017-03-04 RX ADMIN — DIAZEPAM 5 MG: 5 TABLET ORAL at 17:05

## 2017-03-04 RX ADMIN — DIAZEPAM 5 MG: 5 TABLET ORAL at 08:20

## 2017-03-04 RX ADMIN — ACETAMINOPHEN 650 MG: 325 TABLET, FILM COATED ORAL at 08:30

## 2017-03-04 RX ADMIN — FOLIC ACID 1 MG: 1 TABLET ORAL at 08:20

## 2017-03-04 RX ADMIN — IBUPROFEN 200 MG: 200 TABLET, FILM COATED ORAL at 18:25

## 2017-03-04 RX ADMIN — ACETAMINOPHEN 650 MG: 325 TABLET, FILM COATED ORAL at 18:25

## 2017-03-04 RX ADMIN — VALPROIC ACID 250 MG: 250 CAPSULE, LIQUID FILLED ORAL at 08:20

## 2017-03-04 RX ADMIN — GABAPENTIN 300 MG: 300 CAPSULE ORAL at 20:56

## 2017-03-04 RX ADMIN — Medication 2 G: at 08:19

## 2017-03-04 RX ADMIN — GABAPENTIN 400 MG: 400 CAPSULE ORAL at 08:20

## 2017-03-04 ASSESSMENT — PAIN DESCRIPTION - DESCRIPTORS
DESCRIPTORS: ACHING
DESCRIPTORS: HEADACHE

## 2017-03-04 NOTE — PLAN OF CARE
Problem: Goal Outcome Summary  Goal: Goal Outcome Summary  Outcome: No Change  BP (!) 136/99  Temp 98.4  F (36.9  C) (Oral)  Resp 16  Wt 103.2 kg (227 lb 8.2 oz)  SpO2 93%  BMI 33.6 kg/m2      Pt A & O x3. Reports a little jittery and felt like he was going to have a seizure,  but no seizure activity noted this evening shift. MSSA score 8, gave Valium 5mg. Pt states he's feeling better after Valium. Resting in bed, appears comfortable. Took a shower earlier this afternoon. Voiding, not saving. Hoping to D/C tomorrow. Phos being replaced. Up in room independently.

## 2017-03-04 NOTE — PLAN OF CARE
Problem: Goal Outcome Summary  Goal: Goal Outcome Summary  Outcome: Improving  /89 (BP Location: Right arm)  Temp 99.1  F (37.3  C) (Oral)  Resp 20  Wt 102.4 kg (225 lb 11.2 oz)  SpO2 96%  BMI 33.33 kg/m2  A&Ox4.  HR 50-60.  SB-SR.  Diminished inbases and on room air.  BM today.  Standby assist of 1.  Waked X2.  Regular diet.  No nausea or  vomiting.  BS every 4 hours.  Magnesium replaced.  Called .  MSSA 7-9.  Tylenol given for headache.  Transfer order placed.  Zithromax started.  BS every 4 hours.  Continue to monitor.
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
## 2017-03-04 NOTE — PROGRESS NOTES
[facility] Service - Internal Medicine Resident Progress Note  Date of Service: 03/04/2017    Patient: Anirudh Motley  MRN: [id number]  Admission Date: 3/1/2017  Hospital Day # 3    Assessment & Plan:  Mr. Motley is a 46 year old male with PMHx of EtOH abuse notable for recurrent admissions for acute alcohol intoxication with subsequent withdrawal c/b withdrawal seizures. Recently seen in [facility] ED day prior to admission (2/28) after he was brought in by EMS unable to ambulate. Admitted to [facility] 3/1 after again being found intoxicated and unresponsive.      # Acute EtOH Intoxication, resolved  # History of alcohol abuse  # History of alcohol withdrawal seizures  Longstanding hx of EtOH abuse punctuated with numerous admissions for intoxication, withdrawal, hx of withdrawal seizures. Hospitalized at [facility] for PTA seizure 2/23-2/25, sz attributed to EtOH vs medication non-compliance (Keppra); although Keppra initially prescribed in setting of EtOH withdrawal seizure. Subsequently seen in [facility] ED day prior to admission (2/28). Admitted to [facility] 3/1 after again being found intoxicated and unresponsive. Intubated for airway protection, then patient self-extubated shortly thereafter. We are stopping clonidine and reducing gabapentin today. We will continue monitoring for withdrawal today  - Stop clonidine  - Reduce gabapentin to 300mg BID  - Stop valproic acid, confirmed with pharmacy that an abrupt stop was fine  - Restart PTA keppra 1500mg BID  - Continue MSSA protocol with BZDs prn (Diazepam ordered)   - Continue thiamine, folic acid, MVT  -  consult to address chem dep and homelessness. Has been through inpatient chem dep before  - Psychology f/u as an outpatient    # Acute hypoxemic respiratory failure with concern for aspiration pneumonitis, resolved  # Possible community acquired pneumonia  Initial concern that pt may have aspirated given bi-basilar opacities on admission CXR. However,  pt self extubated to RA, WBC, pro-calcitonin all WNL. Endotracheal sputum culture with moderate growth beta-hemolytic strep B, however patient clinically does not have pneumonia. Reports dry cough for past 6 months that is worsening. Suspicion for bacterial pneumonia is lower, however will do empiric course of azithromycin  - Azithromycin 500mg once, then 250mg daily starting tomorrow for total of 5 day antibiotic course  - If cough not improved with ABx, would consider nasal spray or cetirizine for post-nasal drip    CODE: full  DVT: low risk, ambulate  FEN: no IV fluids, replace lytes as needed, regular diet  Disposition: maroon 5 for monitoring of alcohol withdrawal. Likely here for 1-2 more days    Patient staffed with Dr. Allen who agrees with above plan    Miguel Gracia MD  PGY2  Pager: [phone]    ___________________________________________________________________    Subjective & Interval Hx:    Overnight was given 2 doses valium for MSSA scores in the 8-9 range. Was feeling a little jittery last night. This AM feels well. No tremulousness/anxiety. No fevers/chills. Reports on-going dry cough for the past several months that he thinks is getting worse.     Last 24 hr care team notes reviewed.   ROS:  4 point ROS including Respiratory, CV, GI and , other than that noted in the HPI, is negative    Medications:  Reviewed in EPIC. List below for reference    Physical Exam:  Blood pressure 131/89, temperature 97.6  F (36.4  C), temperature source Oral, resp. rate 18, weight 102.4 kg (225 lb 11.2 oz), SpO2 98 %.    I/O last 3 completed shifts:  In: 1690 [P.O.:1440; I.V.:250]  Out: -     General: alert and no distress, no tremulousness  Head: NC/AT  Eyes: non-icteric sclera, EOMI  Pulmonary: CTAB, no increased work of breathing  CV: RRR, +s1/s2  GI: soft, non-tender, obese-appearing, + bowel sounds  Skin: no rashes seen on limited exam  EXT: no edema, WWP  Neuro: A&Ox3, no focal deficits    Labs & Studies of  Note:   Labs and studies reviewed in EPIC    Unresulted Labs Ordered in the Past 30 Days of this Admission     Date and Time Order Name Status Description    3/1/2017 1202 Sputum Culture Aerobic Bacterial Preliminary           Medications list for Reference:   Current Facility-Administered Medications   Medication     azithromycin (ZITHROMAX) tablet 500 mg     [START ON 3/5/2017] azithromycin (ZITHROMAX) tablet 250 mg     levETIRAcetam (KEPPRA) tablet 1,500 mg     gabapentin (NEURONTIN) capsule 300 mg     hydrALAZINE (APRESOLINE) injection 10 mg     thiamine tablet 100 mg     multivitamin, therapeutic (THERA-VIT) tablet 1 tablet     folic acid (FOLVITE) tablet 1 mg     influenza quadrivalent (PF) vacc age 3 yrs and older (FLUZONE or Flulaval) injection 0.5 mL     magnesium sulfate 2 g in NS intermittent infusion (PharMEDium or FV Cmpd)     magnesium sulfate 4 g in 100 mL sterile water (premade)     sodium phosphate 10 mmol in D5W intermittent infusion     sodium phosphate 15 mmol in D5W intermittent infusion     sodium phosphate 20 mmol in D5W intermittent infusion     sodium phosphate 25 mmol in D5W intermittent infusion     acetaminophen (TYLENOL) tablet 650 mg     benzocaine-menthol (CHLORASEPTIC) 6-10 MG lozenge 1 lozenge     ibuprofen (ADVIL/MOTRIN) tablet 200 mg     naloxone (NARCAN) injection 0.1-0.4 mg     potassium chloride SA (K-DUR/KLOR-CON M) CR tablet 20-40 mEq     potassium chloride (KLOR-CON) Packet 20-40 mEq     potassium chloride 10 mEq in 100 mL intermittent infusion     potassium chloride 10 mEq in 100 mL intermittent infusion with 10 mg lidocaine     potassium chloride 20 mEq in 50 mL intermittent infusion     diazepam (VALIUM) tablet 5-20 mg

## 2017-03-04 NOTE — PLAN OF CARE
Problem: Goal Outcome Summary  Goal: Goal Outcome Summary  Outcome: No Change  /77 (BP Location: Left arm)  Temp 98  F (36.7  C) (Oral)  Resp 18  Wt 102.4 kg (225 lb 11.2 oz)  SpO2 99%  BMI 33.33 kg/m2     Neuro: A&Ox 3-4, forgetful of time. Neuros unchanged. MSSA protocol, gave 10 mg PO valium total this shift for MSSA score of 8-9.   Cardiac: SR-SB, HR 50s-70s. Afebrile. BP elevated 150s-160s/90s-100s. Gave PRN hydralazine with decrease in SBP.   Respiratory: Sating >95% on RA. Denies SOB or chest pain.   GI/: Adequate urine output in urinal/ bathroom. BM X1  Diet/appetite: Tolerating regular diet. Appetite fluctuating.   Activity:  Assist of SBA, up to chair and in halls.  Pain: At acceptable level on current regimen. Gave ibuprofen x1 for c/o shoulder pain with reported relief.  Skin: Intact, no new deficits noted.     R: Continue with POC. Notify primary team with changes. Plan is to continue on MSSA protocol and meet with SW before planning discharge.

## 2017-03-04 NOTE — PROGRESS NOTES
Pt transfer from . Report received from ROSINA Razo. Pt arrived to unit at 1455. Up up ind. AVSS on RA besides htn (not in parameters for prn meds). A/Ox4. Gait steady. Pt belongings placed in closet. Pt denies pain besides right shoulder discomfort.     Plan: Continue to monitor and support POC.

## 2017-03-05 ENCOUNTER — APPOINTMENT (OUTPATIENT)
Dept: GENERAL RADIOLOGY | Facility: CLINIC | Age: 47
DRG: 896 | End: 2017-03-05
Attending: INTERNAL MEDICINE
Payer: COMMERCIAL

## 2017-03-05 VITALS
DIASTOLIC BLOOD PRESSURE: 87 MMHG | TEMPERATURE: 98.3 F | HEART RATE: 81 BPM | SYSTOLIC BLOOD PRESSURE: 122 MMHG | WEIGHT: 226.3 LBS | BODY MASS INDEX: 33.67 KG/M2 | RESPIRATION RATE: 16 BRPM | OXYGEN SATURATION: 97 %

## 2017-03-05 PROCEDURE — 25000132 ZZH RX MED GY IP 250 OP 250 PS 637: Performed by: INTERNAL MEDICINE

## 2017-03-05 PROCEDURE — 99238 HOSP IP/OBS DSCHRG MGMT 30/<: CPT | Performed by: INTERNAL MEDICINE

## 2017-03-05 PROCEDURE — 71010 XR CHEST PORT 1 VW: CPT

## 2017-03-05 PROCEDURE — 25000132 ZZH RX MED GY IP 250 OP 250 PS 637: Performed by: STUDENT IN AN ORGANIZED HEALTH CARE EDUCATION/TRAINING PROGRAM

## 2017-03-05 RX ORDER — FLUTICASONE PROPIONATE 50 MCG
1 SPRAY, SUSPENSION (ML) NASAL DAILY
Status: DISCONTINUED | OUTPATIENT
Start: 2017-03-05 | End: 2017-03-05 | Stop reason: HOSPADM

## 2017-03-05 RX ORDER — AZITHROMYCIN 250 MG/1
250 TABLET, FILM COATED ORAL DAILY
Qty: 3 TABLET | Refills: 0 | Status: SHIPPED | OUTPATIENT
Start: 2017-03-05 | End: 2017-03-08

## 2017-03-05 RX ORDER — FLUTICASONE PROPIONATE 50 MCG
1 SPRAY, SUSPENSION (ML) NASAL DAILY
Qty: 1 BOTTLE | Refills: 0 | Status: ON HOLD | OUTPATIENT
Start: 2017-03-05 | End: 2019-02-22

## 2017-03-05 RX ORDER — LEVETIRACETAM 1000 MG/1
1500 TABLET ORAL 2 TIMES DAILY
Qty: 60 TABLET | Refills: 0 | Status: ON HOLD | OUTPATIENT
Start: 2017-03-05 | End: 2019-02-22

## 2017-03-05 RX ADMIN — DIAZEPAM 5 MG: 5 TABLET ORAL at 03:00

## 2017-03-05 RX ADMIN — FLUTICASONE PROPIONATE 1 SPRAY: 50 SPRAY, METERED NASAL at 11:20

## 2017-03-05 RX ADMIN — LEVETIRACETAM 1500 MG: 750 TABLET, FILM COATED ORAL at 08:30

## 2017-03-05 RX ADMIN — AZITHROMYCIN 250 MG: 250 TABLET, FILM COATED ORAL at 08:30

## 2017-03-05 RX ADMIN — THERA TABS 1 TABLET: TAB at 08:30

## 2017-03-05 RX ADMIN — Medication 100 MG: at 08:30

## 2017-03-05 RX ADMIN — ACETAMINOPHEN 650 MG: 325 TABLET, FILM COATED ORAL at 08:36

## 2017-03-05 RX ADMIN — IBUPROFEN 200 MG: 200 TABLET, FILM COATED ORAL at 02:55

## 2017-03-05 RX ADMIN — FOLIC ACID 1 MG: 1 TABLET ORAL at 08:30

## 2017-03-05 NOTE — PLAN OF CARE
Problem: Goal Outcome Summary  Goal: Goal Outcome Summary  Outcome: Adequate for Discharge Date Met:  03/05/17  Patient ready to discharge.  Discharge instructions reviewed and understood by patient. Instructions on medication administration reinforced and agreed upon by patient.  Will discharge to a [facility] home in [place].

## 2017-03-05 NOTE — PROGRESS NOTES
Social Work Services Discharge Note    [facility] later shared with Dr. Allen that he now plans to stay with a family member in [place]. He may use his [employer] ([employer] nurses line (p) [phone]) and should have transport to [facility] since this is his listed 'home address'.     Patient Name:  Anirudh Motley     Anticipated Discharge Date:  03/05/17    Discharge Disposition:   Shelter:  [facility] vs [facility]    Following MD:  Dr. Radha Allen - inpatient provider, [facility] - outpatient provider     Additional Services/Equipment Arranged:  Community resources - shelters & sober housing     Patient / Family response to discharge plan:  [facility] would prefer to stay another evening d/t psychosocial concerns related to housing     Persons notified of above discharge plan:  [facility], Dr. Allen    Staff Discharge Instructions:  Please print a packet and send with patient.     CTS Handoff completed:  YES    Medicare Notice of Rights provided to the patient/family:  NO    SW met again with [facility] to discuss his d/c plans. He continued to ask for support in obtaining transitional housing, photo ID and cell phone. Discussed that he will need to follow-up as an outpatient through community services (homeless shelter staff and other organizations). [facility] has a '[employer] of [employer] Stre[employer] for [place]' and states he has been calling various sober houses.     We discussed chemical dependency issues again. [facility] continues to decline CD treatment resources, discussed that most sober houses will require him to have a treatment plan incl Rule 25 evaluation. [facility] shared he does not see benefit for CD treatment as his primary goal is related to tangible needs, ie shelter.  For transportation, instructed [facility] to call [employer] line (p) [phone]. SW confirmed that patient may use this transport to get to local homeless shelters.   [facility] later shared with Dr. Allen that he now plans to  stay with a family member in [place].     GOYO Fisher, [facility]  , Weekend [facility]  Pager: [phone] (Sat & Sun 8 am - 4:30 pm), on-call/after hours pager [phone]

## 2017-03-05 NOTE — PLAN OF CARE
Problem: Goal Outcome Summary  Goal: Goal Outcome Summary  Outcome: Improving  Assumed cares [id number]. A/o x 3. VSS- Slightly miguel ángel and elevated BP- not within Hydralazine parameters. Pt showered during night. MSSA scores 2, 8, 4. Valium given x 1. Ibuprofen given x 1 for headache. Up IND. SW consulted d/t homeless and possible rehab post D/c. Call light within reach. Will continue to monitor.

## 2017-03-05 NOTE — DISCHARGE SUMMARY
[facility] - Internal Medicine Discharge Summary   Date of Service: 3/5/2017    [facility] AURELIO Motley MRN# [id number]   YOB: 1970 Age: 46 year old     Date of Admission:  3/1/2017  Date of Discharge:  3/5/2017  Admitting Physician:  Jace Wiley MD  Discharge Physician:  Radha Allen MD  Discharging Service:  Internal Medicine, [place] [id number]     Primary Provider: [facility]         Reason for Admission:   Acute alcohol withdrawal, not protecting his airway          Discharge Diagnosis:   Acute alcohol intoxication with withdrawal in context of long history of alcohol abuse  Allergic rhinitis  Atypical pneumonia         Procedures & Significant Findings:   Blood alcohol level of 0.5 on admission  CXR- with evidence of bilateral atelectasis but no evidence of lobar pneumonia         Consultations:   Social  Work         Hospital Course by Problem:    # Acute EtOH Intoxication  # History of alcohol abuse  # History of alcohol withdrawal seizures  [facility] has a longstanding history of EtOH abuse with numerous admissions for intoxication and withdrawal (with seizures). He was hospitalized at [facility] for PTA seizure 2/23-2/25, and his seizures were attributed to EtOH vs medication non-compliance (Keppra). His Keppra was initially prescribed in setting of EtOH withdrawal seizures, but he has been told to continue to take Keppra chronically to prevent future seizures from alcohol withdrawal. He was seen in [facility] in the ED on the day prior to admission (2/28) and admitted to [facility] 3/1 after again being found intoxicated and unresponsive. He was intubated for airway protection and desaturations, and he self-extubated shortly thereafter. He was treated on a benzodiazepine-sparing alcohol withdrawal prevention protocol in the ICU (Valproic acid, gabapentin, and clonidine). These medications were weaned off, and he was covered with a few doses of diazepam as needed.  On the day of discharge, he no longer needed diazepam for his withdrawal symptoms.  SW gave him resources and helped him d/c to a shelter. He would like to go to a sober house but needs to have a rule 25 and do chem dep inpt treatment first. He is not interested in any plan that involves chem dep as it has not worked for him in the past. His PTA keppra was restarted at 1500 mg BID. He plans to f/u with psychology therapy as an outpt for his depression/coping mechanisms.     # Acute hypoxemic respiratory failure with concern for aspiration pneumonitis  # Atypical community acquired pneumonia  # Allergic rhinitis  There was initially concern that Anirudh may have aspirated given bi-basilar opacities on admission CXR. However, when he self-extubated to , his WBC and pro-calcitonin were WNL. His endotracheal sputum culture had moderate growth beta-hemolytic strep B, however this is likely a colonizer due to his lack of sx.  He reports that he has had a dry cough for past 6 months along with nasal congestion and sinus pain. He was empirically treated with azithromycin and he will begin fluticasone nasal spray for likely post-nasal drip related to allergies.     Physical Exam on day of Discharge:  Blood pressure 134/87, pulse 60, temperature 96.4  F (35.8  C), temperature source Oral, resp. rate 16, weight 103.4 kg (228 lb), SpO2 97 %.  General: alert and not distressed, no tremor  HEENT: NC/A, manish sclera anicteric and not injected, EOMI  Pulmonary: CTAB, no increased work of breathing, occasional dry cough  CV: RRR, no m/r/g, cap refill < 2 sec  GI: soft, non-tender, obese, + bowel sounds  Skin: no rashes or lesions noted  EXT: no manish pedal edema  Neuro: A&O, no focal deficits         Pending Results:   none         Discharge Medications:     Current Discharge Medication List      START taking these medications    Details   fluticasone (FLONASE) 50 MCG/ACT spray Spray 1 spray into both nostrils daily  Qty: 1 Bottle,  Refills: 0    Associated Diagnoses: Chronic rhinitis      azithromycin (ZITHROMAX) 250 MG tablet Take 1 tablet (250 mg) by mouth daily for 3 days  Qty: 3 tablet, Refills: 0    Associated Diagnoses: Atypical pneumonia         CONTINUE these medications which have CHANGED    Details   levETIRAcetam (KEPPRA) 1000 MG TABS Take 1,500 mg by mouth 2 times daily  Qty: 60 tablet, Refills: 0    Associated Diagnoses: Seizure prophylaxis                Discharge Instructions and Follow-Up:     Discharge Procedure Orders  Medication Therapy Management Referral   Referral Type: Med Therapy Management     Reason for your hospital stay   Order Comments: You were admitted with alcohol intoxication and were treated through your withdrawal.     Follow Up and recommended labs and tests   Order Comments: Follow up with primary care provider, [facility], within 7 days for hospital follow- up.  No follow up labs or test are needed.     Activity   Order Comments: Your activity upon discharge: activity as tolerated   Order Specific Question Answer Comments   Is discharge order? Yes      Full Code     Diet   Order Comments: Follow this diet upon discharge: Orders Placed This Encounter     Room Service     Regular Diet Adult   Order Specific Question Answer Comments   Is discharge order? Yes              Discharge Disposition:   To shelter, with goal of sober house and with f/u with PCP and to initiate therapy with psychologist as outpt         Condition on Discharge:   Discharge condition: Good   Code status on discharge: Full Code      Date of service: 3/5/2017     15 minutes spent in discharge, including >50% in counseling and coordination of care, medication review and plan of care recommended on follow up. Questions were answered.     It was our pleasure to care for Anirudh during his hospitalization. Please do not hesitate to contact me should there be questions regarding the hospital course or discharge plan.       Radha Allen  Internal Medicine Hospitalist & Staff Physician  [facility]  Pager: [phone]

## 2017-03-05 NOTE — PROVIDER NOTIFICATION
This pt transferred from  on eves, and had tele orders. Do you still want pt on tele? [phone] notified.

## 2017-03-05 NOTE — PLAN OF CARE
Problem: Goal Outcome Summary  Goal: Goal Outcome Summary  Outcome: Improving  Assumed cares at 1900. A/o x 3. VSS. Up IND. MSSA score 2 upon assessment. Denies pain. L MARIE OLIVARES. Call light within reach. Will continue to monitor.

## 2017-03-06 ENCOUNTER — CARE COORDINATION (OUTPATIENT)
Dept: CARDIOLOGY | Facility: CLINIC | Age: 47
End: 2017-03-06

## 2017-03-06 LAB
BACTERIA SPEC CULT: ABNORMAL
MICRO REPORT STATUS: ABNORMAL
MICROORGANISM SPEC CULT: ABNORMAL
SPECIMEN SOURCE: ABNORMAL

## 2017-03-06 NOTE — PROGRESS NOTES
"[facility]  \"Hello, my name is Hallie Villegas , and I am calling from the [facility].  I want to check in and see how you are doing, after leaving the hospital.  You may also receive a call from your Care Coordinator (care team), but I want to make sure you don t have any urgent needs.  I have a couple questions to review with you:     Post-Discharge Outreach                                                    Anirudh Motley is a 46 year old male     Follow-up Appointments           Follow Up and recommended labs and tests       Follow up with primary care provider, [facility], within 7 days for hospital follow- up. No follow up labs or test are needed.                 Care Team:    Patient Care Team       Relationship Specialty Notifications Start End    Clinic, [facility] PCP - General   5/2/13     Comment:  Patient states just started seening MD at this location but cannot remember name    Phone: [phone] Fax: [phone]         [address] [place] [address]            Transition of Care Review                                                      Patient was called three times and no answer so post 24 hr DC follow up calls will be closed out               Plan                                                      Thanks for your time.  Your Care Coordinator may follow-up within the next couple days.  In the meantime if you have questions, concerns or problems call your care team.        Hallie Villegas    "

## 2017-03-07 ENCOUNTER — TELEPHONE (OUTPATIENT)
Dept: PHARMACY | Facility: OTHER | Age: 47
End: 2017-03-07

## 2017-03-07 NOTE — TELEPHONE ENCOUNTER
MTM referral from: Transitions of Care (recent hospital discharge or ED visit)    MTM referral outreach attempt #2 on March 7, 2017 at 12:51 PM      Outcome: Patient is not interested at this time because they feel that it is not necessary at this time, will route to MTM Pharmacist/Provider as an FYI. Thank you for the referral.     Shyanne Cooper MTM Coordinator

## 2017-03-07 NOTE — TELEPHONE ENCOUNTER
MTM referral from: Transitions of Care (recent hospital discharge or ED visit)    MTM referral outreach attempt #1 on March 7, 2017 at 11:12 AM      Outcome: Left Message    Arianna Bell MTM Coordinator Intern

## 2017-03-14 ENCOUNTER — RECORDS - HEALTHEAST (OUTPATIENT)
Dept: ADMINISTRATIVE | Facility: OTHER | Age: 47
End: 2017-03-14

## 2017-06-16 ENCOUNTER — RECORDS - HEALTHEAST (OUTPATIENT)
Dept: ADMINISTRATIVE | Facility: OTHER | Age: 47
End: 2017-06-16

## 2017-08-04 ENCOUNTER — COMMUNICATION - HEALTHEAST (OUTPATIENT)
Dept: INTERNAL MEDICINE | Facility: CLINIC | Age: 47
End: 2017-08-04

## 2017-10-10 ENCOUNTER — COMMUNICATION - HEALTHEAST (OUTPATIENT)
Dept: INTERNAL MEDICINE | Facility: CLINIC | Age: 47
End: 2017-10-10

## 2017-12-11 ENCOUNTER — COMMUNICATION - HEALTHEAST (OUTPATIENT)
Dept: SCHEDULING | Facility: CLINIC | Age: 47
End: 2017-12-11

## 2018-11-16 ENCOUNTER — COMMUNICATION - HEALTHEAST (OUTPATIENT)
Dept: INTERNAL MEDICINE | Facility: CLINIC | Age: 48
End: 2018-11-16

## 2019-02-18 ENCOUNTER — HOSPITAL ENCOUNTER (INPATIENT)
Facility: CLINIC | Age: 49
LOS: 9 days | Discharge: HOME | End: 2019-02-27
Attending: EMERGENCY MEDICINE | Admitting: PSYCHIATRY & NEUROLOGY
Payer: COMMERCIAL

## 2019-02-18 ENCOUNTER — TELEPHONE (OUTPATIENT)
Dept: BEHAVIORAL HEALTH | Facility: CLINIC | Age: 49
End: 2019-02-18

## 2019-02-18 DIAGNOSIS — F10.929 ALCOHOL INTOXICATION, WITH UNSPECIFIED COMPLICATION (H): ICD-10-CM

## 2019-02-18 DIAGNOSIS — F10.10 ALCOHOL ABUSE: ICD-10-CM

## 2019-02-18 DIAGNOSIS — J31.0 CHRONIC RHINITIS: ICD-10-CM

## 2019-02-18 DIAGNOSIS — F10.220 ALCOHOL DEPENDENCE WITH UNCOMPLICATED INTOXICATION (H): Primary | ICD-10-CM

## 2019-02-18 PROBLEM — F19.20 CHEMICAL DEPENDENCY (H): Status: ACTIVE | Noted: 2019-02-18

## 2019-02-18 LAB
ALBUMIN SERPL-MCNC: 4 G/DL (ref 3.4–5)
ALCOHOL BREATH TEST: 0.39 (ref 0–0.01)
ALP SERPL-CCNC: 85 U/L (ref 40–150)
ALT SERPL W P-5'-P-CCNC: 38 U/L (ref 0–70)
ANION GAP SERPL CALCULATED.3IONS-SCNC: 20 MMOL/L (ref 3–14)
AST SERPL W P-5'-P-CCNC: 47 U/L (ref 0–45)
BASOPHILS # BLD AUTO: 0.1 10E9/L (ref 0–0.2)
BASOPHILS NFR BLD AUTO: 0.8 %
BILIRUB SERPL-MCNC: 0.9 MG/DL (ref 0.2–1.3)
BUN SERPL-MCNC: 18 MG/DL (ref 7–30)
CALCIUM SERPL-MCNC: 7.8 MG/DL (ref 8.5–10.1)
CHLORIDE SERPL-SCNC: 105 MMOL/L (ref 94–109)
CO2 SERPL-SCNC: 16 MMOL/L (ref 20–32)
CREAT SERPL-MCNC: 0.66 MG/DL (ref 0.66–1.25)
DIFFERENTIAL METHOD BLD: ABNORMAL
EOSINOPHIL # BLD AUTO: 0.1 10E9/L (ref 0–0.7)
EOSINOPHIL NFR BLD AUTO: 1.3 %
ERYTHROCYTE [DISTWIDTH] IN BLOOD BY AUTOMATED COUNT: 13.2 % (ref 10–15)
GFR SERPL CREATININE-BSD FRML MDRD: >90 ML/MIN/{1.73_M2}
GGT SERPL-CCNC: 122 U/L (ref 0–75)
GLUCOSE SERPL-MCNC: 77 MG/DL (ref 70–99)
HCT VFR BLD AUTO: 46.6 % (ref 40–53)
HGB BLD-MCNC: 15.7 G/DL (ref 13.3–17.7)
IMM GRANULOCYTES # BLD: 0 10E9/L (ref 0–0.4)
IMM GRANULOCYTES NFR BLD: 0.2 %
LYMPHOCYTES # BLD AUTO: 1.3 10E9/L (ref 0.8–5.3)
LYMPHOCYTES NFR BLD AUTO: 13.9 %
MCH RBC QN AUTO: 31.2 PG (ref 26.5–33)
MCHC RBC AUTO-ENTMCNC: 33.7 G/DL (ref 31.5–36.5)
MCV RBC AUTO: 93 FL (ref 78–100)
MONOCYTES # BLD AUTO: 0.2 10E9/L (ref 0–1.3)
MONOCYTES NFR BLD AUTO: 1.9 %
NEUTROPHILS # BLD AUTO: 7.8 10E9/L (ref 1.6–8.3)
NEUTROPHILS NFR BLD AUTO: 81.9 %
NRBC # BLD AUTO: 0 10*3/UL
NRBC BLD AUTO-RTO: 0 /100
PLATELET # BLD AUTO: 117 10E9/L (ref 150–450)
POTASSIUM SERPL-SCNC: 4.6 MMOL/L (ref 3.4–5.3)
PROT SERPL-MCNC: 8.2 G/DL (ref 6.8–8.8)
RBC # BLD AUTO: 5.03 10E12/L (ref 4.4–5.9)
SODIUM SERPL-SCNC: 140 MMOL/L (ref 133–144)
TSH SERPL DL<=0.005 MIU/L-ACNC: 0.97 MU/L (ref 0.4–4)
WBC # BLD AUTO: 9.6 10E9/L (ref 4–11)

## 2019-02-18 PROCEDURE — 25000128 H RX IP 250 OP 636: Performed by: EMERGENCY MEDICINE

## 2019-02-18 PROCEDURE — 82977 ASSAY OF GGT: CPT | Performed by: EMERGENCY MEDICINE

## 2019-02-18 PROCEDURE — 12800008 ZZH R&B CD ADULT

## 2019-02-18 PROCEDURE — 85025 COMPLETE CBC W/AUTO DIFF WBC: CPT | Performed by: EMERGENCY MEDICINE

## 2019-02-18 PROCEDURE — 99285 EMERGENCY DEPT VISIT HI MDM: CPT | Mod: 25

## 2019-02-18 PROCEDURE — 96375 TX/PRO/DX INJ NEW DRUG ADDON: CPT

## 2019-02-18 PROCEDURE — 40000141 ZZH STATISTIC PERIPHERAL IV START W/O US GUIDANCE

## 2019-02-18 PROCEDURE — HZ2ZZZZ DETOXIFICATION SERVICES FOR SUBSTANCE ABUSE TREATMENT: ICD-10-PCS | Performed by: PSYCHIATRY & NEUROLOGY

## 2019-02-18 PROCEDURE — 25000132 ZZH RX MED GY IP 250 OP 250 PS 637: Performed by: PSYCHIATRY & NEUROLOGY

## 2019-02-18 PROCEDURE — 25800025 ZZH RX 258: Performed by: EMERGENCY MEDICINE

## 2019-02-18 PROCEDURE — 96366 THER/PROPH/DIAG IV INF ADDON: CPT

## 2019-02-18 PROCEDURE — 84443 ASSAY THYROID STIM HORMONE: CPT | Performed by: EMERGENCY MEDICINE

## 2019-02-18 PROCEDURE — 25000125 ZZHC RX 250: Performed by: EMERGENCY MEDICINE

## 2019-02-18 PROCEDURE — 82075 ASSAY OF BREATH ETHANOL: CPT

## 2019-02-18 PROCEDURE — 99285 EMERGENCY DEPT VISIT HI MDM: CPT | Mod: Z6 | Performed by: EMERGENCY MEDICINE

## 2019-02-18 PROCEDURE — 96365 THER/PROPH/DIAG IV INF INIT: CPT

## 2019-02-18 PROCEDURE — 25000131 ZZH RX MED GY IP 250 OP 636 PS 637: Performed by: PSYCHIATRY & NEUROLOGY

## 2019-02-18 PROCEDURE — 25000128 H RX IP 250 OP 636: Performed by: PSYCHIATRY & NEUROLOGY

## 2019-02-18 PROCEDURE — 80053 COMPREHEN METABOLIC PANEL: CPT | Performed by: EMERGENCY MEDICINE

## 2019-02-18 RX ORDER — TRAZODONE HYDROCHLORIDE 50 MG/1
50 TABLET, FILM COATED ORAL
Status: DISCONTINUED | OUTPATIENT
Start: 2019-02-18 | End: 2019-02-27 | Stop reason: HOSPADM

## 2019-02-18 RX ORDER — ONDANSETRON 2 MG/ML
4 INJECTION INTRAMUSCULAR; INTRAVENOUS ONCE
Status: COMPLETED | OUTPATIENT
Start: 2019-02-18 | End: 2019-02-18

## 2019-02-18 RX ORDER — LEVETIRACETAM 500 MG/1
1000 TABLET ORAL ONCE
Status: COMPLETED | OUTPATIENT
Start: 2019-02-18 | End: 2019-02-18

## 2019-02-18 RX ORDER — LANOLIN ALCOHOL/MO/W.PET/CERES
100 CREAM (GRAM) TOPICAL DAILY
Status: DISCONTINUED | OUTPATIENT
Start: 2019-02-19 | End: 2019-02-19

## 2019-02-18 RX ORDER — HYDROXYZINE HYDROCHLORIDE 25 MG/1
25 TABLET, FILM COATED ORAL EVERY 4 HOURS PRN
Status: DISCONTINUED | OUTPATIENT
Start: 2019-02-18 | End: 2019-02-27 | Stop reason: HOSPADM

## 2019-02-18 RX ORDER — ALUMINA, MAGNESIA, AND SIMETHICONE 2400; 2400; 240 MG/30ML; MG/30ML; MG/30ML
30 SUSPENSION ORAL EVERY 4 HOURS PRN
Status: DISCONTINUED | OUTPATIENT
Start: 2019-02-18 | End: 2019-02-27 | Stop reason: HOSPADM

## 2019-02-18 RX ORDER — MULTIPLE VITAMINS W/ MINERALS TAB 9MG-400MCG
1 TAB ORAL DAILY
Status: DISCONTINUED | OUTPATIENT
Start: 2019-02-19 | End: 2019-02-27 | Stop reason: HOSPADM

## 2019-02-18 RX ORDER — DIAZEPAM 5 MG
5-20 TABLET ORAL EVERY 30 MIN PRN
Status: DISCONTINUED | OUTPATIENT
Start: 2019-02-18 | End: 2019-02-27 | Stop reason: HOSPADM

## 2019-02-18 RX ORDER — BISACODYL 10 MG
10 SUPPOSITORY, RECTAL RECTAL DAILY PRN
Status: DISCONTINUED | OUTPATIENT
Start: 2019-02-18 | End: 2019-02-27 | Stop reason: HOSPADM

## 2019-02-18 RX ORDER — PROCHLORPERAZINE MALEATE 5 MG
5-10 TABLET ORAL
Status: DISCONTINUED | OUTPATIENT
Start: 2019-02-18 | End: 2019-02-18 | Stop reason: CLARIF

## 2019-02-18 RX ORDER — ACETAMINOPHEN 325 MG/1
650 TABLET ORAL EVERY 4 HOURS PRN
Status: DISCONTINUED | OUTPATIENT
Start: 2019-02-18 | End: 2019-02-19

## 2019-02-18 RX ORDER — ATENOLOL 50 MG/1
50 TABLET ORAL DAILY PRN
Status: DISCONTINUED | OUTPATIENT
Start: 2019-02-18 | End: 2019-02-27 | Stop reason: HOSPADM

## 2019-02-18 RX ORDER — LEVETIRACETAM 1000 MG/1
1000 TABLET ORAL
Status: DISCONTINUED | OUTPATIENT
Start: 2019-02-18 | End: 2019-02-19

## 2019-02-18 RX ORDER — ONDANSETRON 4 MG/1
4-8 TABLET, ORALLY DISINTEGRATING ORAL EVERY 6 HOURS PRN
Status: DISCONTINUED | OUTPATIENT
Start: 2019-02-18 | End: 2019-02-27 | Stop reason: HOSPADM

## 2019-02-18 RX ORDER — FOLIC ACID 1 MG/1
1 TABLET ORAL DAILY
Status: DISCONTINUED | OUTPATIENT
Start: 2019-02-19 | End: 2019-02-27 | Stop reason: HOSPADM

## 2019-02-18 RX ADMIN — PROCHLORPERAZINE EDISYLATE 10 MG: 5 INJECTION INTRAMUSCULAR; INTRAVENOUS at 22:14

## 2019-02-18 RX ADMIN — DIAZEPAM 10 MG: 5 TABLET ORAL at 20:34

## 2019-02-18 RX ADMIN — DIAZEPAM 10 MG: 5 TABLET ORAL at 22:08

## 2019-02-18 RX ADMIN — FOLIC ACID: 5 INJECTION, SOLUTION INTRAMUSCULAR; INTRAVENOUS; SUBCUTANEOUS at 16:03

## 2019-02-18 RX ADMIN — LEVETIRACETAM 1000 MG: 500 TABLET, FILM COATED ORAL at 22:07

## 2019-02-18 RX ADMIN — ONDANSETRON 4 MG: 2 INJECTION INTRAMUSCULAR; INTRAVENOUS at 18:11

## 2019-02-18 RX ADMIN — ONDANSETRON 8 MG: 4 TABLET, ORALLY DISINTEGRATING ORAL at 20:34

## 2019-02-18 ASSESSMENT — ENCOUNTER SYMPTOMS
SEIZURES: 1
PSYCHIATRIC NEGATIVE: 1
RESPIRATORY NEGATIVE: 1
CONSTITUTIONAL NEGATIVE: 1
MUSCULOSKELETAL NEGATIVE: 1
GASTROINTESTINAL NEGATIVE: 1
CARDIOVASCULAR NEGATIVE: 1

## 2019-02-18 ASSESSMENT — MIFFLIN-ST. JEOR: SCORE: 1866.9

## 2019-02-18 NOTE — TELEPHONE ENCOUNTER
S: Dr. Tucker from [place] ED called with report; pt requesting detox from etoh; per ED physician, reports using 1 liter of vodka daily for months, last reported use was today; breathalyzer .38 upon arrival to ED; pt reportedly denies use of other substances    B: no known hx of IP detox on 3A; pt reports daily use of etoh, and reports withdrawal symptoms if he stops using; per ED physician, pt does report hx of withdrawal seizures when coming off of alcohol; no hx of DTs reported; per ED physician, pt denies SI or HI at this time, no acute mental health concerns reported; pt reportedly denies use of drugs    A: per Dr. Tucker, pt has been medically cleared and is appropriate for detox; ED physician states pt is ambulatory; voluntary/cooperative-pt agrees to sign self in; utox needs to be collected    R: 3A/[facility] (Dr. Stringer accepts for self); straight cd; unit notified of admission ([facility]), disposition given to [place] ED, along with phone number for RN to RN report 5:58pm

## 2019-02-18 NOTE — ED NOTES
[facility], [place]   ED Nurse to Floor Handoff     Anirudh Motley is a 48 year old male who speaks English and lives alone,  home status is unknown  They arrived in the ED by ambulance from hotel    ED Chief Complaint: Alcohol Intoxication    ED Dx;   Final diagnoses:   Alcohol abuse   Alcohol intoxication, with unspecified complication (H)         Needed?: No    Allergies:   Allergies   Allergen Reactions     Penicillins Hives and Rash   .  Past Medical Hx:   Past Medical History:   Diagnosis Date     Alcohol dependence (H)     lives in wet house.  Per family has had seizures associated with EtOH in past     Anxiety      Depression       Baseline Mental status: WDL  Current Mental Status changes: other Intoxicated, alert, intermittently confused to place/situation, easily reoriented, not impulsive or getting out of bed    Infection present or suspected this encounter: no  Sepsis suspected: No  Isolation type: No active isolations     Activity level - Baseline/Home:  Independent  Activity Level - Current:   SBA, currently not leaving the bed, using urinal/brief    Bariatric equipment needed?: No    In the ED these meds were given:   Medications   dextrose 5% and 0.45% NaCl 1,000 mL with INFUVITE ADULT 10 mL, thiamine 100 mg, folic acid 1 mg infusion (not administered)       Drips running?  Yes  See MAR, banana bag  Home pump  No    Current LDAs       Labs results:   Labs Ordered and Resulted from Time of ED Arrival Up to the Time of Departure from the ED   ALCOHOL BREATH TEST POCT - Abnormal; Notable for the following components:       Result Value    Alcohol Breath Test 0.388 (*)     All other components within normal limits   COMPREHENSIVE METABOLIC PANEL   CBC WITH PLATELETS DIFFERENTIAL   DRUG ABUSE SCREEN 6 CHEM DEP URINE ([facility])   PERIPHERAL IV CATHETER       Imaging Studies: No results found for this or any previous visit (from the past 24 hour(s)).    Recent vital  "signs:   BP (!) 129/92   Temp 97.8  F (36.6  C) (Oral)   Resp 22   Ht 1.753 m (5' 9\")   Wt 101.2 kg (223 lb)   SpO2 97%   BMI 32.93 kg/m      Cardiac Rhythm: Normal Sinus  Pt needs tele? No  Skin/wound Issues: None    Code Status: Full Code    Pain control: pt had none    Nausea control: pt had none    Abnormal labs/tests/findings requiring intervention: ETOH intoxication    Family present during ED course? No   Family Comments/Social Situation comments: none    Tasks needing completion: Ut      [phone] [facility] ED      "

## 2019-02-18 NOTE — ED TRIAGE NOTES
"Pt brought in by ambulance from [facility] for \"passed out\". No reports of fever, cough, SOB or chest pressure. Suspected ETOH use  "

## 2019-02-18 NOTE — ED PROVIDER NOTES
"  History     Chief Complaint   Patient presents with     Alcohol Intoxication     The history is provided by the patient and medical records.     Anirudh Motley is a 48 year old male with a medical history significant for alcohol dependence with withdrawal, alcohol withdrawal seizures, anxiety, and depression who presents to the Emergency Department via EMS for evaluation of alcohol intoxication.   He drinks about 1 liter of vodka daily, denies street drugs and denies recent detox or treatment.  Per chart review, patient has been seen at an ED several times in the past for alcohol abuse and alcohol withdrawal seizures.    Here, patient reports he was at a hotel earlier and \"passed out\" so someone called 911 and he was brought to the ED. He notes that within the past 24 hours, he's experienced an episode of seizure and have finished a liter of vodka. He complains of blurry vision. He denies a family history of seizures.    He is voluntary    Past Medical History:   Diagnosis Date     Alcohol dependence (H)     lives in [place].  Per family has had seizures associated with EtOH in past     Anxiety      Depression        History reviewed. No pertinent surgical history.    History reviewed. No pertinent family history.    Social History     Tobacco Use     Smoking status: Never Smoker     Smokeless tobacco: Never Used   Substance Use Topics     Alcohol use: Yes       No current facility-administered medications for this encounter.      Current Outpatient Medications   Medication     fluticasone (FLONASE) 50 MCG/ACT spray     levETIRAcetam (KEPPRA) 1000 MG TABS        Allergies   Allergen Reactions     Penicillins Hives and Rash       I have reviewed the Medications, Allergies, Past Medical and Surgical History, and Social History in the Epic system.    Review of Systems   Constitutional: Negative.    Eyes:        Positive for blurry vision   Respiratory: Negative.    Cardiovascular: Negative.    Gastrointestinal: " "Negative.    Musculoskeletal: Negative.    Skin: Negative.    Neurological: Positive for seizures (Within past 24 hours).   Psychiatric/Behavioral: Negative.  Negative for suicidal ideas.   All other systems reviewed and are negative.      Physical Exam   BP: (!) 129/92  Heart Rate: 98  Temp: 97.8  F (36.6  C)  Resp: 12  Height: 175.3 cm (5' 9\")  Weight: 101.2 kg (223 lb)  SpO2: 97 %      Physical Exam   Constitutional: He is oriented to person, place, and time. He has a sickly appearance.   HENT:   Head: Atraumatic.   Mouth/Throat: Mucous membranes are dry.   Eyes: Pupils are equal, round, and reactive to light.   Cardiovascular: Normal rate, regular rhythm and normal heart sounds.   Pulmonary/Chest: Effort normal and breath sounds normal.   Abdominal: Soft. There is no tenderness.   Neurological: He is alert and oriented to person, place, and time. He displays no tremor.   Skin: Skin is warm.   Psychiatric: He has a normal mood and affect. His behavior is normal. His speech is slurred. Cognition and memory are normal. He expresses impulsivity. He expresses no suicidal ideation.   Nursing note and vitals reviewed.      ED Course        Procedures        Medications   dextrose 5% and 0.45% NaCl 1,000 mL with INFUVITE ADULT 10 mL, thiamine 100 mg, folic acid 1 mg infusion ( Intravenous New Bag 2/18/19 0027)            Labs Ordered and Resulted from Time of ED Arrival Up to the Time of Departure from the ED   COMPREHENSIVE METABOLIC PANEL - Abnormal; Notable for the following components:       Result Value    Carbon Dioxide 16 (*)     Anion Gap 20 (*)     Calcium 7.8 (*)     AST 47 (*)     All other components within normal limits   CBC WITH PLATELETS DIFFERENTIAL - Abnormal; Notable for the following components:    Platelet Count 117 (*)     All other components within normal limits   ALCOHOL BREATH TEST POCT - Abnormal; Notable for the following components:    Alcohol Breath Test 0.388 (*)     All other components " within normal limits   DRUG ABUSE SCREEN 6 CHEM DEP URINE ([facility])   PERIPHERAL IV CATHETER            Assessments & Plan (with Medical Decision Making)   48-year-old male who is a chronic alcoholic.  He is intoxicated here.  He is interested in going to detox.  He does have a history of withdrawal seizures.  Interestingly, he has not been in treatment or been detoxed in the past to his knowledge.  He does not have a seizure disorder.  He is currently intoxicated at 0.38.  He denies other drug use, he denies chronic medical problems.  He is homeless.  He feels like he may have had a seizure a few days ago, but he has no injuries from that.  Here he has mild alcoholic ketoacidosis.  He was given a banana bag.  He is voluntary.  A bed is available on the [facility] and he is medically stable, not currently in withdrawal.    This part of the medical record was transcribed by Narciso Longoria, Medical Scribe, from a dictation done by Devyn Tucker MD.       I have reviewed the nursing notes.    I have reviewed the findings, diagnosis, plan and need for follow up with the patient.       Medication List      There are no discharge medications for this visit.         Final diagnoses:   Alcohol abuse   Alcohol intoxication, with unspecified complication (H)     I, Caden Hurtado, am serving as a trained medical scribe to document services personally performed by Devyn Tucker MD, based on the provider's statements to me.      Devyn SANTACRUZ MD, was physically present and have reviewed and verified the accuracy of this note documented by Caden Hurtado.     2/18/2019   [facility], [place], EMERGENCY DEPARTMENT     Devyn Tucker MD  02/18/19 1653       Devyn Tucker MD  02/18/19 1701       Devyn Tucker MD  02/18/19 1713

## 2019-02-19 ENCOUNTER — APPOINTMENT (OUTPATIENT)
Dept: GENERAL RADIOLOGY | Facility: CLINIC | Age: 49
End: 2019-02-19
Attending: NURSE PRACTITIONER
Payer: COMMERCIAL

## 2019-02-19 ENCOUNTER — APPOINTMENT (OUTPATIENT)
Dept: CT IMAGING | Facility: CLINIC | Age: 49
End: 2019-02-19
Attending: NURSE PRACTITIONER
Payer: COMMERCIAL

## 2019-02-19 LAB
ALBUMIN SERPL-MCNC: 4.1 G/DL (ref 3.4–5)
ALP SERPL-CCNC: 79 U/L (ref 40–150)
ALT SERPL W P-5'-P-CCNC: 31 U/L (ref 0–70)
ANION GAP SERPL CALCULATED.3IONS-SCNC: 9 MMOL/L (ref 3–14)
AST SERPL W P-5'-P-CCNC: 34 U/L (ref 0–45)
BILIRUB SERPL-MCNC: 2.2 MG/DL (ref 0.2–1.3)
BUN SERPL-MCNC: 12 MG/DL (ref 7–30)
CALCIUM SERPL-MCNC: 8.3 MG/DL (ref 8.5–10.1)
CHLORIDE SERPL-SCNC: 98 MMOL/L (ref 94–109)
CO2 SERPL-SCNC: 28 MMOL/L (ref 20–32)
CREAT SERPL-MCNC: 0.74 MG/DL (ref 0.66–1.25)
GFR SERPL CREATININE-BSD FRML MDRD: >90 ML/MIN/{1.73_M2}
GLUCOSE SERPL-MCNC: 125 MG/DL (ref 70–99)
MAGNESIUM SERPL-MCNC: 1.8 MG/DL (ref 1.6–2.3)
PHOSPHATE SERPL-MCNC: 1.3 MG/DL (ref 2.5–4.5)
POTASSIUM SERPL-SCNC: 3.4 MMOL/L (ref 3.4–5.3)
PROT SERPL-MCNC: 8.1 G/DL (ref 6.8–8.8)
RADIOLOGIST FLAGS: NORMAL
SODIUM SERPL-SCNC: 135 MMOL/L (ref 133–144)

## 2019-02-19 PROCEDURE — 73700 CT LOWER EXTREMITY W/O DYE: CPT | Mod: LT

## 2019-02-19 PROCEDURE — 12800008 ZZH R&B CD ADULT

## 2019-02-19 PROCEDURE — 83735 ASSAY OF MAGNESIUM: CPT | Performed by: NURSE PRACTITIONER

## 2019-02-19 PROCEDURE — 99207 ZZC CONSULT E&M CHANGED TO INITIAL LEVEL: CPT | Performed by: NURSE PRACTITIONER

## 2019-02-19 PROCEDURE — 25000131 ZZH RX MED GY IP 250 OP 636 PS 637: Performed by: PSYCHIATRY & NEUROLOGY

## 2019-02-19 PROCEDURE — 36415 COLL VENOUS BLD VENIPUNCTURE: CPT | Performed by: NURSE PRACTITIONER

## 2019-02-19 PROCEDURE — 99222 1ST HOSP IP/OBS MODERATE 55: CPT | Performed by: NURSE PRACTITIONER

## 2019-02-19 PROCEDURE — 99221 1ST HOSP IP/OBS SF/LOW 40: CPT | Mod: AI | Performed by: PSYCHIATRY & NEUROLOGY

## 2019-02-19 PROCEDURE — 25000128 H RX IP 250 OP 636: Performed by: PSYCHIATRY & NEUROLOGY

## 2019-02-19 PROCEDURE — 84100 ASSAY OF PHOSPHORUS: CPT | Performed by: NURSE PRACTITIONER

## 2019-02-19 PROCEDURE — 73610 X-RAY EXAM OF ANKLE: CPT | Mod: LT

## 2019-02-19 PROCEDURE — 25000132 ZZH RX MED GY IP 250 OP 250 PS 637: Performed by: CLINICAL NURSE SPECIALIST

## 2019-02-19 PROCEDURE — 25000132 ZZH RX MED GY IP 250 OP 250 PS 637: Performed by: NURSE PRACTITIONER

## 2019-02-19 PROCEDURE — 80053 COMPREHEN METABOLIC PANEL: CPT | Performed by: NURSE PRACTITIONER

## 2019-02-19 PROCEDURE — 25000132 ZZH RX MED GY IP 250 OP 250 PS 637: Performed by: PSYCHIATRY & NEUROLOGY

## 2019-02-19 PROCEDURE — 99207 ZZC DOWN CODE DUE TO SUBSEQUENT EXAM: CPT | Performed by: PSYCHIATRY & NEUROLOGY

## 2019-02-19 RX ORDER — IBUPROFEN 600 MG/1
600 TABLET, FILM COATED ORAL EVERY 6 HOURS PRN
Status: DISCONTINUED | OUTPATIENT
Start: 2019-02-19 | End: 2019-02-27 | Stop reason: HOSPADM

## 2019-02-19 RX ORDER — LANOLIN ALCOHOL/MO/W.PET/CERES
100 CREAM (GRAM) TOPICAL 2 TIMES DAILY
Status: DISCONTINUED | OUTPATIENT
Start: 2019-02-19 | End: 2019-02-27 | Stop reason: HOSPADM

## 2019-02-19 RX ORDER — LEVETIRACETAM 500 MG/1
1000 TABLET ORAL 2 TIMES DAILY
Status: DISCONTINUED | OUTPATIENT
Start: 2019-02-19 | End: 2019-02-27 | Stop reason: HOSPADM

## 2019-02-19 RX ORDER — TRIAMCINOLONE ACETONIDE 1 MG/G
CREAM TOPICAL 2 TIMES DAILY
Status: DISPENSED | OUTPATIENT
Start: 2019-02-19 | End: 2019-02-24

## 2019-02-19 RX ORDER — CYANOCOBALAMIN 1000 UG/ML
1000 INJECTION, SOLUTION INTRAMUSCULAR; SUBCUTANEOUS
Status: DISCONTINUED | OUTPATIENT
Start: 2019-02-19 | End: 2019-02-27 | Stop reason: HOSPADM

## 2019-02-19 RX ORDER — CLONIDINE HYDROCHLORIDE 0.1 MG/1
0.1 TABLET ORAL 2 TIMES DAILY PRN
Status: DISCONTINUED | OUTPATIENT
Start: 2019-02-19 | End: 2019-02-21

## 2019-02-19 RX ADMIN — HYDROXYZINE HYDROCHLORIDE 25 MG: 25 TABLET ORAL at 19:01

## 2019-02-19 RX ADMIN — DIAZEPAM 10 MG: 5 TABLET ORAL at 02:57

## 2019-02-19 RX ADMIN — FOLIC ACID 1 MG: 1 TABLET ORAL at 08:14

## 2019-02-19 RX ADMIN — ONDANSETRON 8 MG: 4 TABLET, ORALLY DISINTEGRATING ORAL at 02:57

## 2019-02-19 RX ADMIN — DIAZEPAM 10 MG: 5 TABLET ORAL at 00:24

## 2019-02-19 RX ADMIN — Medication 100 MG: at 08:14

## 2019-02-19 RX ADMIN — DIBASIC SODIUM PHOSPHATE, MONOBASIC POTASSIUM PHOSPHATE AND MONOBASIC SODIUM PHOSPHATE 250 MG: 852; 155; 130 TABLET ORAL at 20:29

## 2019-02-19 RX ADMIN — MULTIPLE VITAMINS W/ MINERALS TAB 1 TABLET: TAB at 12:27

## 2019-02-19 RX ADMIN — IBUPROFEN 600 MG: 600 TABLET ORAL at 20:29

## 2019-02-19 RX ADMIN — DIAZEPAM 10 MG: 5 TABLET ORAL at 08:14

## 2019-02-19 RX ADMIN — DIAZEPAM 10 MG: 5 TABLET ORAL at 01:46

## 2019-02-19 RX ADMIN — DIAZEPAM 10 MG: 5 TABLET ORAL at 03:47

## 2019-02-19 RX ADMIN — LEVETIRACETAM 1000 MG: 500 TABLET, FILM COATED ORAL at 20:30

## 2019-02-19 RX ADMIN — DIAZEPAM 5 MG: 5 TABLET ORAL at 16:31

## 2019-02-19 RX ADMIN — THIAMINE HCL TAB 100 MG 100 MG: 100 TAB at 20:29

## 2019-02-19 RX ADMIN — HYDROXYZINE HYDROCHLORIDE 25 MG: 25 TABLET ORAL at 05:04

## 2019-02-19 RX ADMIN — DIBASIC SODIUM PHOSPHATE, MONOBASIC POTASSIUM PHOSPHATE AND MONOBASIC SODIUM PHOSPHATE 250 MG: 852; 155; 130 TABLET ORAL at 12:27

## 2019-02-19 RX ADMIN — CYANOCOBALAMIN 1000 MCG: 1000 INJECTION, SOLUTION INTRAMUSCULAR at 10:54

## 2019-02-19 NOTE — PROGRESS NOTES
Patient throughout the night shift refused to use his call bell and kept getting out of bed without assist after being prompted several times that this behavior was not acceptable. Patient was placed on 1:1 for falls and some confusion. He is much steadier now but still needs help with stand-by assist and 1:1 attendant. Will continue to monitor.

## 2019-02-19 NOTE — PROGRESS NOTES
Patient's MSSA scores were 15, 16, 15, and 11. Patient received prn Valium per order (see MAR). Patient also received prn hydroxyzine (see MAR) for anxiety rated at a 7/7. Patient noted to be asleep for reassessment of anxiety. Will continue to monitor and update if there are changes.

## 2019-02-19 NOTE — H&P
Admitted:     02/18/2019      IDENTIFYING INFORMATION:  The patient is a 48-year-old  male.  He is homeless.  He is unemployed.        HISTORY OF PRESENT ILLNESS:  He came to the emergency room from [facility] Emergency [facility].  According to the note, he was in a hotel and passed out and someone called 911.  The patient has chronic alcoholism.  Started drinking at the age of 14.  By 1993, after a divorce, it became a problem.  He has been drinking every day since.  He has been in over 50 detoxifications.  Had civil commitments, 8 chemical dependency treatments.  He has been drinking daily a liter of vodka.  He has tolerance, blackouts, withdrawal, progressive loss of control.  Tried to quit unsuccessfully despite negative consequences, money, job.  He does not use any drugs, does not smoke, does not carter.  He does not have any mental health issues, he has been drinking for 20 years, that are independent of his drinking.  He does not have any suicidal ideation, plan or intent.  The patient was previously living in a wet house, and he realized that it is not what he wants and he wants to get help.      PAST PSYCHIATRIC HISTORY:  Eight chemical dependency treatments, 50 detoxifications.  He was in civil commitments.      REVIEW OF SYSTEMS:  Ten-point system reviewed and is negative.       VITAL SIGNS:  Temperature of 97.5, pulse of 114, respiration rate of 16, blood pressure of 148/90.      FAMILY HISTORY:  Father has alcoholism.        SOCIAL HISTORY:  Born in [place].  Described childhood was all right.               MENTAL STATUS EXAMINATION:  The patient is a 48-year-old  male who appears older than stated age.  He has poor grooming, poor hygiene, poor eye contact.  Mood is tired.  Affect is congruent.  Speech is less spontaneous, logical in thinking, no loose association.  Insight and judgment are limited.  Does not have any active suicidal ideation.  Does not have any auditory or visual  hallucinations.  Alert, oriented x 3.  Recent and remote memory, language, fund of knowledge are all adequate.      DIAGNOSIS:   Axis I:  Alcohol use disorder, severe.      PLAN:  The patient will be detoxed off alcohol using MSSA protocol and Valium.  Given the patient has been very unsteady, he will be continued on one-to-one.  He wants to do CD treatment.         JOSE ARMANDO HANSEN MD             D: 2019   T: 2019   MT: LAURA      Name:     LATA WEBB   MRN:      [id number]        Account:      [id number]   :      1970        Admitted:     2019                   Document: [id number]

## 2019-02-19 NOTE — PROGRESS NOTES
02/18/19 1932   Patient Belongings   Did you bring any home meds/supplements to the hospital?  No   Patient Belongings other (see comments)   Belongings Search Yes   Clothing Search Yes   Second Staff dustin   Pt came in ambulance.  Wearing a paper gown.  No personal effects came with him.    Nothing in storage, or med room, or security upon admission. (no phone, no wallet)    Items brought in: 2/22/2019    Security: env [id number] ($18, [place] ID, [place] Card)    Storage bin: 2 pair shoes, 2 bags hygiene products, 1 jacket, 1 wallet 1 key, 1 suitcase    A               Admission:  I am responsible for any personal items that are not sent to the safe or pharmacy.  [facility] is not responsible for loss, theft or damage of any property in my possession.    Signature:  _________________________________ Date: _______  Time: _____                                              Staff Signature:  ____________________________ Date: ________  Time: _____      2nd Staff person, if patient is unable/unwilling to sign:    Signature: ________________________________ Date: ________  Time: _____     Discharge:  [facility] has returned all of my personal belongings:    Signature: _________________________________ Date: ________  Time: _____                                          Staff Signature:  ____________________________ Date: ________  Time: _____

## 2019-02-19 NOTE — CONSULTS
Internal Medicine Consult - Initial Visit       Anirudh Motley MRN# [id number]   YOB: 1970 Age: 48 year old   Date of Admission: 2/18/2019  PCP: [facility], [facility]  Date of Service: 2/19/2019    Referring Provider: Darnell Stringer MD  Reason for Consult: Medical co-management          Assessment and Recommendations:   Anirudh Motley is a 48 year old male with a history of alcohol abuse, alcohol withdrawal seizures, depression, and anxiety admitted to station 3A for detox from alcohol.      # Alcohol withdrawal, hx of alcohol abuse - MSSA 14 this shift.  Reports hx of withdrawal seizures, last occurring 1 day prior to admission per pt report.  Reports being on Keppra for at least 10 years but intermittently noncompliant, maintenance dose unclear (most recently appears to have been prescribed 1g BID, but previously on 1500mg BID in 5/2018).  Last seen by Neurology in 2/2016.  D/w Neurology, agreed w/ restarting 1000mg BID for now but could titrate up to 1500mg BID if needed.    - Seizure precautions     - Continue Keppra 1000mg BID   - Continue MSSA   - Folvite, multi-vites, thiamine supplementation   - Will need OP follow up with Neurology (new referral placed)   - Further management per Psychiatry     # Hypophosphatemia - Phos low at 1.3 this am.  Likely 2/2 poor PO intake in setting of alcohol use.  Otherwise lytes wnl.    - Start NeutraPhos 250mg BID x 3 days   - Recheck Phos in 2-3 days   - Encourage 1/2 to 1 cup skim milk daily while on unit     # NIRMAL - Resolved.  Bicarb 16, AG 20 on admission, normalized after IV fluids.  Felt to be 2/2 alcohol ingestion.   - Continue to encourage PO hydration    # L ankle pain - Reports falling in the snow and twisting his ankle about 1 week ago. 3 view XR L ankle today 2/19 shows subtle cortical irregularity about the anterior and posterior tibial margins, unable to exclude fracture.    - Discussed ankle XR findings with Ortho this  "afternoon, recommended CT of ankle to evaluate further   - For now, continue w/ NWB to LLE   - Continue fall precautions, 1:1    - Can offer Tylenol PRN for pain   - Will follow up w/ Ortho re: CT findings     # Thrombocytopenia - Plt 117 on admission.  Likely 2/2 alcohol use.    - recheck CBC in 1-2 days     # Contact dermatitis - Small pinpoint slightly raised erythematous rash on forearms and elbows.  Unclear duration, present on admission.  Denies pain or itchiness. No obvious contributory rash.   - Kenalog cream to affected areas BID x 5 days   - Monitor - please notify IM if rash spreads to other areas or if pt w/ new fevers         # Depression, anxiety - Anxious appearing.  Previously on Cymbalta and Elavil though not currently taking as of 1/9/19.    - Management per Psychiatry       Medicine will follow along pending workup for multiple medical issues as discussed above.  Thank you for this consult.       Vernell Ford CNP  Hospitalist Service   Pager: [phone]             History of Present Illness:   History is obtained from the patient and medical record.     This patient is a 48 year old male with a history of alcohol abuse, alcohol withdrawal seizures, depression, and anxiety admitted to station 3A for detox from alcohol.      Internal Medicine service was asked to see patient for medical co-management of detox process.  [facility] is resting in bed.  He is a vague historian.  He feels anxious and is still going through bad withdrawals.  He was drinking \"heavily\" during the last week.  He reports hx of withdrawal related seizures, with one occurring the day before admission.  Reports being on Keppra \"for a long time\", possibly as long as ten years.   His most urgent complaint is left ankle pain that began and has worsened after he \"went down in the snow\" about 1 week ago.  He reports being unable to bear weight on his left lower extremity.  He had nausea and vomiting yesterday, but that has resolved " today.  Able to tolerate PO intake.  He reports sensation of chest tightness mostly due to anxiety in setting of withdrawal.  He reports having a rash on his arms and elbows, but does not recall any precipitating factors, seemed mostly unaware of it.  Reports slight abdominal pain.  Otherwise, he denies fevers, chills, dysuria, diarrhea, constipation, and dyspnea.  Denies sick contacts.             Review of Systems:   A 10 point ROS was performed and negative unless otherwise noted in HPI.           Past Medical History:   Reviewed and updated in Epic.  Past Medical History:   Diagnosis Date     Alcohol dependence (H)     lives in wet house.  Per family has had seizures associated with EtOH in past     Anxiety      Depression              Past Surgical History:   Reviewed and updated in Epic.  History reviewed. No pertinent surgical history.          Social History:   Reviewed and updated in [employer].  Social History     Socioeconomic History     Marital status: Single     Spouse name: Not on file     Number of children: Not on file     Years of education: Not on file     Highest education level: Not on file   Social Needs     Financial resource strain: Not on file     Food insecurity - worry: Not on file     Food insecurity - inability: Not on file     Transportation needs - medical: Not on file     Transportation needs - non-medical: Not on file   Occupational History     Not on file   Tobacco Use     Smoking status: Never Smoker     Smokeless tobacco: Never Used   Substance and Sexual Activity     Alcohol use: Yes     Drug use: No     Sexual activity: Not on file   Other Topics Concern     Not on file   Social History Narrative     Not on file              Family History:   Reviewed and updated in Epic.  History reviewed. No pertinent family history.          Allergies:     Allergies   Allergen Reactions     Penicillins Hives and Rash             Medications:     Current Facility-Administered Medications  "  Medication     alum & mag hydroxide-simethicone (MYLANTA ES/MAALOX  ES) suspension 30 mL     atenolol (TENORMIN) tablet 50 mg     bisacodyl (DULCOLAX) Suppository 10 mg     diazepam (VALIUM) tablet 5-20 mg     folic acid (FOLVITE) tablet 1 mg     hydrOXYzine (ATARAX) tablet 25 mg     levETIRAcetam (KEPPRA) TABS 1,000 mg     magnesium hydroxide (MILK OF MAGNESIA) suspension 30 mL     multivitamin w/minerals (THERA-VIT-M) tablet 1 tablet     ondansetron (ZOFRAN-ODT) ODT tab 4-8 mg     traZODone (DESYREL) tablet 50 mg     vitamin B-12 (CYANOCOBALAMIN) injection 1,000 mcg     vitamin B1 (THIAMINE) tablet 100 mg            Physical Exam:   Blood pressure 148/90, pulse 114, temperature 97.5  F (36.4  C), temperature source Oral, resp. rate 16, height 1.753 m (5' 9\"), weight 101.2 kg (223 lb), SpO2 99 %.  Body mass index is 32.93 kg/m .    GENERAL: Alert and oriented x 3. Well nourished, well developed.  Anxious.    HEENT: Normocephalic, atraumatic. Anicteric sclera. Mucous membranes moist.   CV: RRR. S1, S2. No murmurs appreciated.   RESPIRATORY: Effort normal on room air. Lungs CTAB with no wheezing, rales, or rhonchi.   GI: Abdomen soft and slightly distended, bowel sounds present x all 4 quadrants. No tenderness, rebound, or guarding.   NEUROLOGICAL: No focal deficits. Follows commands.  Strength equal in upper extremities.   MUSCULOSKELETAL: Mild swelling around L ankle. No erythema or bruising.   EXTREMITIES: No gross deformities. No peripheral edema. Intact bilateral pedal pulses.   SKIN: Grossly warm, dry, and intact. No jaundice. Slight pinpoint papular rash on forearms and at elbows.              Data:   I personally reviewed the following studies:    ROUTINE IP LABS (Last four results)  CMP   Recent Labs   Lab 02/18/19  1555      POTASSIUM 4.6   CHLORIDE 105   CO2 16*   ANIONGAP 20*   GLC 77   BUN 18   CR 0.66   JT 7.8*   PROTTOTAL 8.2   ALBUMIN 4.0   BILITOTAL 0.9   ALKPHOS 85   AST 47*   ALT 38 "     CBC   Recent Labs   Lab 02/18/19  1555   WBC 9.6   RBC 5.03   HGB 15.7   HCT 46.6   MCV 93   MCH 31.2   MCHC 33.7   RDW 13.2   *     INR No lab results found in last 7 days.        Unresulted Labs Ordered in the Past 30 Days of this Admission     No orders found for last 61 day(s).

## 2019-02-19 NOTE — PROGRESS NOTES
Restriction Information:      Please call the [facility] unit for additional information. The telephone number is [phone] or [phone].   Provider number [id number], [facility] is a provider for a Restricted Recipient for: Physician Services , Nurse Practitioner Services , Diagnostic Lab.   Provider number [id number], [facility] is a provider for a Restricted Recipient for: Physician Services , Inpatient Hospital , Outpatient Hospital , Diagnostic Lab.   Provider number [id number], [facility] # is a provider for a Restricted Recipient for: Pharmacy.   Provider number [id number], [facility] is a provider for a Restricted Recipient for: Physician Services , Nurse Practitioner Services.   If you are providing services other than the restricted services listed above, you may bill DHS. If you have questions, please call [phone] or [phone].  All services effective and current, w/auth for IP [employer] at [phone].    No auth for OP/IOP/Partial  Bill blue plus ma/pmap blc

## 2019-02-19 NOTE — PLAN OF CARE
Behavioral Team Discussion: (2/19/2019)    Continued Stay Criteria/Rationale: Patient admitted for Chemical Use Issues.  Plan: The following services will be provided to the patient; psychiatric assessment, medication management, therapeutic milieu, individual and group support, and skills groups.   Participants: 3A Provider: Dr. Darnell Stringer MD; 3A RN's: Kenneth Dickson, RN; 3A CM's: Terrie Looney.  Summary/Recommendation: Providers will assess today for treatment recommendations, discharge planning, and aftercare plans. CM will meet with pt for discharge planning.   Medical/Physical: Deferred (see medical notes).  Precautions:   Behavioral Orders   Procedures     Assault precautions     Code 1 - Restrict to Unit     Fall precautions     Routine Programming     As clinically indicated     Seizure precautions     Status 15     Every 15 minutes.     Status Individual Observation     High Fall Risk     Order Specific Question:   CONTINUOUS 24 hours / day     Answer:   1 foot     Order Specific Question:   Indications for SIO     Answer:   Medical equipment / ligature risk     Withdrawal precautions     Rationale for change in precautions or plan: N/A  Progress: Initial.

## 2019-02-19 NOTE — PLAN OF CARE
"Pt appears slow and unsteady while on his feet, \"I twisted my left ankle so kind of wobbly\". Stats happened \"probably a week ago\" denies problems with his skin. Is alert and oriented, knows it is Tuesday but is unaware of the date. Denies any suicidal ideation plans or intent. Endorses anxiety and depression both rated a 10 of 10 in severity. Endorses feeling hopeless. MSSA score today is a 13, 10 mg po valium administered (total of 70mg since admission).  "

## 2019-02-19 NOTE — PROGRESS NOTES
Pt is slow and unsteady on his feet. Is receiving a sba now with aid of the 1:1 staff. Pt states feeling like his ankle is worsening and asked to be seen regarding ankle issue. Informed pt that PT is going to assess him as well as internal medicine. SIO for safety continues based on high risk for fall.

## 2019-02-19 NOTE — PLAN OF CARE
"S:Pt is admitted via ambulance from  [facility] ED for Detox from alcohol. He was found at a hotel, intoxicated, had urinated on himself and tells me he had a seizure at the hotel and someone called 911.This was not reported from ED PTA. Report indicated no recent detox or treatments and patient was ambulating.  He reports that he is homeless and has been staying at the hotel. We phoned ED  as patient arrived with no belongings and a paper gown and pants. They reported he came to the ED naked with no belongings.   B: Pt is drinking 1 liter vodka daily \"forever,\" breathalyzer .38. Pt has a hx of seizures, reports he had one today and is suppose to be on Keppra \"I have not take that for weeks, maybe 1000 mg two times a day.\" He is unable to tell me what pharmacy filled his last Rx. Pt denies any other medical issues. Reports \"at least 20 detoxs, 8 treatments\" and h/o living in a wet house. He is on the do not admit list at [facility] per chart review and told me this is due to an \"altercation with the staff there.\" Pt denies any mental health admissions but endorses depression without SI.    A: Pt is in mild alcohol withdrawal given Valium. He is able to ambulate independently but requires stand by assistance, was given a bell to ring for assistance and a bedside urinal.  Seizures pad placed on the floor and he has been placed on seizure, withdrawal and fall precautions. Pt denies any current suicidal ideation. Pt denies any illness or injuries, except seizures. He is nauseated, had a emesis and was given zofran.  He had an additional emesis \"I'm throwing up blood I'm losing it.\" No blood noted in emesis only undigested food. He may be given compazine X1 po or IM if further nausea/emesis. Will need to contact MD if Compazine is not effective. He was confused asking to go to the hospital, despite explanation that he was in a hospital. Pt unable to take oral Keppra and should be given dose tonight if/when he can " tolerate. MD request dose be verified if possible with his pharmacy in the am. Pt is currently sleeping and has had no further emesis.

## 2019-02-19 NOTE — PROGRESS NOTES
Spoke with Philomena, pharmacy staff at [facility], regarding pt dosing of keppra. Per Philomena Harmon records show his last keppra order dated 5/2/18 from Dr. Juwan Cisse as Keppra 750 mg (take 2 capsules) twice daily. Updated Dr. Stringer and hospital pharmacy.

## 2019-02-19 NOTE — PROGRESS NOTES
Writer met with pt to discuss aftercare plans. Pt reports that he is interested in treatment and needs housing as well. Pt reports he is chronically homeless and in need of long-term stable supportive environment. Writer mentioned the possibility of referral to [facility] in [place]. Pt mentioned he is interested in this program. Pt is currently working on completing his paperwork with the assistance of his 1:1 psych associate, will turn in paperwork to RN when complete. Writer placed call to [facility] and left message with intake requesting return call to discuss referral.  CM continues

## 2019-02-19 NOTE — PROGRESS NOTES
Pt woke up asking for compazine IM for nausea, he has not had any further emesis.  MSSA 9, given Valuim 10 mg, compazine 10 mg IM and Keppra 1000mg.  Will continue to monitor alcohol withdrawal. .

## 2019-02-19 NOTE — PROGRESS NOTES
Discussed need for code 2 for x-ray with Dr. Stringer, order obtained. Pt escorted to and from x-ray without incident. Code 2 in place for the off-unit activity.

## 2019-02-19 NOTE — PROGRESS NOTES
"CLINICAL NUTRITION SERVICES - ASSESSMENT NOTE     Nutrition Prescription    RECOMMENDATIONS FOR MDs/PROVIDERS TO ORDER:  Continue to monitor lytes and replace as needed.     Malnutrition Status:    Unable to determine due to pt asleep upon RD visit    Recommendations already ordered by Registered Dietitian (RD):  Supplement: Boost Plus with meals    Future/Additional Recommendations:  -Monitor PO and ONS acceptability.   -Nutrition Hx and NFPE as able.     REASON FOR ASSESSMENT  Anirudh Motley is a/an 48 year old male assessed by the dietitian for Admission Nutrition Risk Screen for reduced oral intake over the last month    NUTRITION HISTORY  PMH/Reason for Admission: Alcohol dependence with withdrawal, alcohol withdrawal seizures, anxiety, and depression who presented for evaluation of alcohol intoxication.     Nutrition Hx: Per H&P, pt drinks about 1 liter of vodka daily and denies recent detox or treatment. Unable to obtain more detailed nutrition hx d/t pt asleep upon RD visit.     CURRENT NUTRITION ORDERS  Diet: Regular  Intake/Tolerance: Pt assistant reports pt is eating most of his entrees and drinking milk, but not eating sides. RD asked if he might like Boost given he is drinking milk and assistant reported it would be worth trying.     LABS  Labs reviewed  -Phos 1.3 (L)  -K 3.4 (WNL - down from 4.6 on 2/18)  -Mg 1.8 (WNL)    MEDICATIONS  Medications reviewed  -Thera-Vit-M  -Folvite  -Thiamine  -Vitamin B12  -Zofran  -NeutraPhos    ANTHROPOMETRICS  Height: 175.3 cm (5' 9\")  Most Recent Weight: 101.2 kg on 2/18/19  IBW: 72.7 kg (139% IBW)  BMI: 32.93 kg/m2; Obesity Grade I BMI 30-34.9  Weight History: Current weight is comparable to that around a year ago.   Wt Readings from Last 10 Encounters:   02/18/19 101.2 kg (223 lb)   03/05/17 102.6 kg (226 lb 4.8 oz)   04/18/14 96.3 kg (212 lb 4.9 oz)   05/03/13 83.4 kg (183 lb 12.8 oz)   10/23/12 90.7 kg (200 lb)   Dosing Weight: 80 kg (adjusted, based on admit wt of " 101.2 kg on 2/18/19 and IBW)    ASSESSED NUTRITION NEEDS  Estimated Energy Needs: 6894-7154 kcals/day (20 - 25 kcals/kg)  Justification: Obese  Estimated Protein Needs: 64-80 grams protein/day (0.8 - 1 grams of pro/kg)  Justification: Maintenance  Estimated Fluid Needs: 1 mL/kcal  Justification: Maintenance    PHYSICAL FINDINGS  See malnutrition section below.    MALNUTRITION  % Intake: Unable to assess  % Weight Loss: None noted  Subcutaneous Fat Loss: Unable to assess  Muscle Loss: Unable to assess  Fluid Accumulation/Edema: None noted in chart review  Malnutrition Diagnosis: Unable to determine due to pt asleep upon RD visit    NUTRITION DIAGNOSIS  Inadequate oral intake related to ETOH use and withdrawal symptoms as evidenced by pt likely not eating well PTA with ETOH use, hypophosphatemia (1.3 on 2/19),  and not yet eating full meals.    INTERVENTIONS  Implementation  -Nutrition Education: Unable d/t pt asleep.   -Medical food supplement therapy: As above.    Goals  Patient to consume % of nutritionally adequate meal trays TID, or the equivalent with supplements/snacks.     Monitoring/Evaluation  Progress toward goals will be monitored and evaluated per protocol.    Rylie Tse RD, LD  Pager: [phone]

## 2019-02-20 PROCEDURE — L4360 PNEUMAT WALKING BOOT PRE CST: HCPCS

## 2019-02-20 PROCEDURE — 25000131 ZZH RX MED GY IP 250 OP 636 PS 637: Performed by: PSYCHIATRY & NEUROLOGY

## 2019-02-20 PROCEDURE — 25000132 ZZH RX MED GY IP 250 OP 250 PS 637: Performed by: CLINICAL NURSE SPECIALIST

## 2019-02-20 PROCEDURE — 25000132 ZZH RX MED GY IP 250 OP 250 PS 637: Performed by: PSYCHIATRY & NEUROLOGY

## 2019-02-20 PROCEDURE — 12800008 ZZH R&B CD ADULT

## 2019-02-20 PROCEDURE — 25000132 ZZH RX MED GY IP 250 OP 250 PS 637: Performed by: NURSE PRACTITIONER

## 2019-02-20 RX ORDER — ACETAMINOPHEN 325 MG/1
325 TABLET ORAL EVERY 4 HOURS PRN
Status: DISCONTINUED | OUTPATIENT
Start: 2019-02-20 | End: 2019-02-27 | Stop reason: HOSPADM

## 2019-02-20 RX ADMIN — IBUPROFEN 600 MG: 600 TABLET ORAL at 10:54

## 2019-02-20 RX ADMIN — DIBASIC SODIUM PHOSPHATE, MONOBASIC POTASSIUM PHOSPHATE AND MONOBASIC SODIUM PHOSPHATE 250 MG: 852; 155; 130 TABLET ORAL at 08:40

## 2019-02-20 RX ADMIN — DIAZEPAM 10 MG: 5 TABLET ORAL at 04:27

## 2019-02-20 RX ADMIN — TRIAMCINOLONE ACETONIDE: 1 CREAM TOPICAL at 21:59

## 2019-02-20 RX ADMIN — DIAZEPAM 10 MG: 5 TABLET ORAL at 12:11

## 2019-02-20 RX ADMIN — MULTIPLE VITAMINS W/ MINERALS TAB 1 TABLET: TAB at 08:40

## 2019-02-20 RX ADMIN — LEVETIRACETAM 1000 MG: 500 TABLET, FILM COATED ORAL at 08:40

## 2019-02-20 RX ADMIN — ONDANSETRON 4 MG: 4 TABLET, ORALLY DISINTEGRATING ORAL at 01:12

## 2019-02-20 RX ADMIN — CLONIDINE HYDROCHLORIDE 0.1 MG: 0.1 TABLET ORAL at 20:57

## 2019-02-20 RX ADMIN — FOLIC ACID 1 MG: 1 TABLET ORAL at 08:40

## 2019-02-20 RX ADMIN — IBUPROFEN 600 MG: 600 TABLET ORAL at 04:27

## 2019-02-20 RX ADMIN — LEVETIRACETAM 1000 MG: 500 TABLET, FILM COATED ORAL at 20:57

## 2019-02-20 RX ADMIN — THIAMINE HCL TAB 100 MG 100 MG: 100 TAB at 08:40

## 2019-02-20 RX ADMIN — DIAZEPAM 10 MG: 5 TABLET ORAL at 01:12

## 2019-02-20 RX ADMIN — IBUPROFEN 600 MG: 600 TABLET ORAL at 17:35

## 2019-02-20 RX ADMIN — DIAZEPAM 10 MG: 5 TABLET ORAL at 08:42

## 2019-02-20 RX ADMIN — HYDROXYZINE HYDROCHLORIDE 25 MG: 25 TABLET ORAL at 20:57

## 2019-02-20 RX ADMIN — DIBASIC SODIUM PHOSPHATE, MONOBASIC POTASSIUM PHOSPHATE AND MONOBASIC SODIUM PHOSPHATE 250 MG: 852; 155; 130 TABLET ORAL at 20:57

## 2019-02-20 RX ADMIN — THIAMINE HCL TAB 100 MG 100 MG: 100 TAB at 20:57

## 2019-02-20 RX ADMIN — TRAZODONE HYDROCHLORIDE 50 MG: 50 TABLET ORAL at 21:59

## 2019-02-20 ASSESSMENT — ACTIVITIES OF DAILY LIVING (ADL)
HYGIENE/GROOMING: INDEPENDENT
HYGIENE/GROOMING: INDEPENDENT

## 2019-02-20 NOTE — PROGRESS NOTES
Writer and pt met, completed Rule 25 and faxed to [facility] and [facility] for treatment. Pt reports his belongings are at a [facility] in [place] and has spoken with them, they have his belongings now but he is unsure how long they will keep them. Pt will call his mother to see if she can pick them up.  CM will follow-up with treatment programs on 2/21.

## 2019-02-20 NOTE — PROGRESS NOTES
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 Recovery Environment   1. Are you employed/attending school? Tell me about that.     Pt reports he is unemployed and not in school.    2A. Describe a typical day; evening for you. Work, school, social, leisure, volunteer, spiritual practices. Include time spent obtaining, using, recovering from drugs or alcohol. (DSM)     \"wake up, hang out on the street, drink all day, sleep.\"    Please describe what leisure activities have been associated with your substance abuse:     The patient denied having any leisure activities which had been associated with his substance abuse.    2B. How often do you spend more time than you planned using or use more than you planned? (DSM)     \"Very often.\"    3. How important is using to your social connections? Do many of your family or friends use?     Pt reports it is not important. Reports his father uses substances but lives far away.    4A. Are you currently in a significant relationship?     No    4C. Sexual Orientation:     Heterosexual    5A. Who do you live with?      Pt reports he is currently homeless. States that occassionally he will stay with his mother.    5B. Tell me about their alcohol/drug use and mental health issues.     Denies " concerns with mother.    5C. Are you concerned for your safety there? Yes, on the street    5D. Are you concerned about the safety of anyone else who lives with you? No    6A. Do you have children who live with you?     No    6B. Do you have children who do not live with you?     Yes.  (Ask follow-up questions to determine the person's relationship and responsibility, both legal and care giving; and what arrangements are made for supervision for the children when the person is not available.) Pt reports he has 3 children ages 21, 19 and 15. Reports oldest lives in [place] and 2 youngest live with their mother in [place].    7A. Who supports you in making changes in your alcohol or drug use? What are they willing to do to support you? Who is upset or angry about you making changes in your alcohol or drug use? How big a problem is this for you?      Pt reports he is supported by his family. Reports they all want him to be sober and none are upset or angry with him seeking sobriety.    7B. This table is provided to record information about the person s relationships and available support It is not necessary to ask each item; only to get a comprehensive picture of their support system.  How often can you count on the following people when you need someone?   Partner / Spouse The patient does not have a current partner or spouse.   Parent(s)/Aunt(s)/Uncle(s)/Grandparents Always supportive   Sibling(s)/Cousin(s) Always supportive   Child(gil) Always supportive   Other relative(s) The patient doesn't have any current contact with other relatives.   Friend(s)/neighbor(s) Always supportive   Child(gil) s father(s)/mother(s) Never supportive   Support group member(s) Always supportive   Community of shmuel members Always supportive   /counselor/therapist/healer The patient denied having any current involvement with a , counselor, therapist or healer.   Other (specify) No     8A. What is your current  living situation?     Pt reports he is homeless and sometimes will stay with his mother.    8B. What is your long term plan for where you will be living?     Long term treatment and aftercare housing or sober living environment.    8C. Tell me about your living environment/neighborhood? Ask enough follow up questions to determine safety, criminal activity, availability of alcohol and drugs, supportive or antagonistic to the person making changes.      Pt reports he is currently homeless. Reports he does not feel safe on the street. Reports easy access to alcohol and substances.    9. Criminal justice history: Gather current/recent history and any significant history related to substance use--Arrests? Convictions? Circumstances? Alcohol or drug involvement? Sentences? Still on probation or parole? Expectations of the court? Current court order? Any sex offenses - lifetime? What level? (DSM)    Pt reports a history of 3 DWI's and has spent 1 month in longterm. Most recent DWI was in . Pt reports he is not currently on probation.    10. What obstacles exist to participating in treatment? (Time off work, childcare, funding, transportation, pending longterm time, living situation)     The patient denied having any obstacles for participating in substance abuse treatment.    Dimension VI Ratings   Recovery environment - The placing authority must use the criteria in Dimension VI to determine a client s recovery environment.   RISK DESCRIPTIONS - Severity ratin Client has (A) Chronically antagonistic significant other, living environment, family, peer group or long-term criminal justice involvement that is harmful to recovery or treatment progress, or (B) Client has an actively antagonistic significant other, family, work, or living environment with immediate threat to the client's safety and well-being.    REASONS SEVERITY WAS ASSIGNED - (What support does the person have for making changes? What structure/stability does  "the person have in his or her daily life that will increase the likelihood that changes can be sustained? What problems exist in the person s environment that will jeopardize getting/staying clean and sober?)     Pt reports he is currently unemployed and not in school. Pt reports he is homeless and has been staying in hotels. Reports he \"sometimes\" will stay at his mothers house. Pt lacks daily structure, sober network, meaningful activity. Pt reports binge drinking during the days. Pt reports he does not feel safe living on the street and is hopeful for long-term treatment and housing/sober living. Pt reports past history of 3 DWI's, not currently on probation. Current recovery environment places pt at risk for relapse/continued use.         Client Choice/Exceptions   Would you like services specific to language, age, gender, culture, Rastafarian preference, race, ethnicity, sexual orientation or disability?  No    What particular treatment choices and options would you like to have? Residential Treatment and Housing/Sober Living    Do you have a preference for a particular treatment program? [facility]     Criteria for Diagnosis     Criteria for Diagnosis  DSM-5 Criteria for Substance Use Disorder  Instructions: Determine whether the client currently meets the criteria for Substance Use Disorder using the diagnostic criteria in the DSM-V pp.481-589. Current means during the most recent 12 months outside a facility that controls access to substances    Category of Substance Severity (ICD-10 Code / DSM 5 Code)     Alcohol Use Disorder Severe  (10.20) (303.90)   Cannabis Use Disorder The patient does not meet the criteria for a Cannabis use disorder.   Hallucinogen Use Disorder The patient does not meet the criteria for a Hallucinogen use disorder.   Inhalant Use Disorder The patient does not meet the criteria for an Inhalant use disorder.   Opioid Use Disorder The patient does not meet the " criteria for an Opioid use disorder.   Sedative, Hypnotic, or Anxiolytic Use Disorder The patient does not meet the criteria for a Sedative/Hypnotic use disorder.   Stimulant Related Disorder The patient does not meet the criteria for a Stimulant use disorder.   Tobacco Use Disorder The patient does not meet the criteria for a Tobacco use disorder.   Other (or unknown) Substance Use Disorder The patient does not meet the criteria for a Other (or unknown) Substance use disorder.       Collateral Contact Summary   Number of contacts made: 2    Contact with referring person:  Yes    If court related records were reviewed, summarize here: No court records had been reviewed at the time of this documentation.    Information from collateral contacts supported/largely agreed with information from the client and associated risk ratings.      Rule 25 Assessment Summary and Plan   's Recommendation    1)  Complete a residential based or similar treatment program  2)  Abstain from all mood-altering chemicals unless prescribed by a licensed provider.   3)  Attend weekly 12-step support group meetings.     4)  Actively work with a male sponsor or  through [employer] ([phone]).   5)  Follow all the recommendations of your treatment/medical providers.  6)  Remain law abiding and follow all recommendations of the Courts/PO.  7)  Patient may benefit from obtaining a full mental health evaluation.      Collateral Contacts     Name:    Dr. Devyn Tucker MD   Relationship:    ED Physician   Phone Number:    [phone] Releases:         Anirudh Motley is a 48 year old male with a medical history significant for alcohol dependence with withdrawal, alcohol withdrawal seizures, anxiety, and depression who presents to the Emergency Department via EMS for evaluation of alcohol intoxication.   He drinks about 1 liter of vodka daily, denies street drugs and denies recent detox or treatment.  Per chart  "review, patient has been seen at an ED several times in the past for alcohol abuse and alcohol withdrawal seizures.     Here, patient reports he was at a hotel earlier and \"passed out\" so someone called 911 and he was brought to the ED. He notes that within the past 24 hours, he's experienced an episode of seizure and have finished a liter of vodka. He complains of blurry vision. He denies a family history of seizures.        Collateral Contacts     Name:    Dr. Darnell Stringer MD   Relationship:    [facility]   Phone Number:    [phone]   Releases:         He came to the emergency room from [facility] Emergency Room.  According to the note, he was in a hotel and passed out and someone called 911.  The patient has chronic alcoholism.  Started drinking at the age of 14.  By 1993, after a divorce, it became a problem.  He has been drinking every day since.  He has been in over 50 detoxifications.  Had civil commitments, 8 chemical dependency treatments.  He has been drinking daily a liter of vodka.  He has tolerance, blackouts, withdrawal, progressive loss of control.  Tried to quit unsuccessfully despite negative consequences, money, job.  He does not use any drugs, does not smoke, does not carter.  He does not have any mental health issues, he has been drinking for 20 years, that are independent of his drinking.  He does not have any suicidal ideation, plan or intent.  The patient was previously living in a wet house, and he realized that it is not what he wants and he wants to get help.     ollateral Contacts      A problematic pattern of alcohol/drug use leading to clinically significant impairment or distress, as manifested by at least two of the following, occurring within a 12-month period:    1.) Alcohol/drug is often taken in larger amounts or over a longer period than was intended.  2.) There is a persistent desire or unsuccessful efforts to cut down or control alcohol/drug use  3.) A great " deal of time is spent in activities necessary to obtain alcohol, use alcohol, or recover from its effects.  4.) Craving, or a strong desire or urge to use alcohol/drug  5.) Recurrent alcohol/drug use resulting in a failure to fulfill major role obligations at work, school or home.  6.) Continued alcohol use despite having persistent or recurrent social or interpersonal problems caused or exacerbated by the effects of alcohol/drug.  7.) Important social, occupational, or recreational activities are given up or reduced because of alcohol/drug use.  8.) Recurrent alcohol/drug use in situations in which it is physically hazardous.  9.) Alcohol/drug use is continued despite knowledge of having a persistent or recurrent physical or psychological problem that is likely to have been caused or exacerbated by alcohol.  10.) Tolerance, as defined by either of the following: A need for markedly increased amounts of alcohol/drug to achieve intoxication or desired effect.  11.) Withdrawal, as manifested by either of the following: The characteristic withdrawal syndrome for alcohol/drug (refer to Criteria A and B of the criteria set for alcohol/drug withdrawal).      Specify if: In early remission:  After full criteria for alcohol/drug use disorder were previously met, none of the criteria for alcohol/drug use disorder have been met for at least 3 months but for less than 12 months (with the exception that Criterion A4,  Craving or a strong desire or urge to use alcohol/drug  may be met).     In sustained remission:   After full criteria for alcohol use disorder were previously met, none of the criteria for alcohol/drug use disorder have been met at any time during a period of 12 months or longer (with the exception that Criterion A4,  Craving or strong desire or urge to use alcohol/drug  may be met).   Specify if:   This additional specifier is used if the individual is in an environment where access to alcohol is  restricted.    Mild: Presence of 2-3 symptoms  Moderate: Presence of 4-5 symptoms  Severe: Presence of 6 or more symptoms

## 2019-02-20 NOTE — TELEPHONE ENCOUNTER
----- Message from Yair Barahona PA-C sent at 2/20/2019 12:02 PM CST -----  Hello,    We received a curbside consult on this patient with a avulsion fracture of the lateral malleolus. We recommended that he be placed in a CAM boot, WBAT. I would like him to be seen by one of our medical orthopedists in six weeks for repeat ankle imaging to confirm the fracture has healed.     Thanks,  Yair

## 2019-02-20 NOTE — PROGRESS NOTES
Pt. Diaphoretic, nauseus, tremulous and anxious.  MSSA 12 & 10 and 10 X 2 valium given.  Zofran given for nausea.

## 2019-02-20 NOTE — PROGRESS NOTES
Brief Internal Medicine Note, 2/20/19:    IM following for complaints of L ankle pain.     Discussed CT ankle on 2/19 w/ radiology, as final read is not yet complete.  On preliminary review, pt appears to have small avulsion fracture of the lateral malleolus.  Will await final read and discuss further with Ortho.      Addendum:   Final read of CT left ankle with small avulsion fracture involving the lateral malleolus and soft tissue swelling the lateral greater than medial aspect of the ankle.  Also noted additional area of bony proliferation favored to represent early osteophytic spurring or sequela of chronic trauma.  Discussed with Ortho resident, recommend Orthotics consult for CAM boot and follow up in walk-in Orthopedic clinic in 6 weeks for repeat XR of ankle.      For now, continue to encourage pt to keep L foot elevated when at rest and ice packs for 20min at a time as needed throughout the day.  Continue NWB to LLE until CAM boot in place.  Can alternate Tylenol and Ibuprofen as ordered for pain PRN.        Vernell Ford, [facility]  Hospitalist Service   Pager: [phone]

## 2019-02-20 NOTE — PLAN OF CARE
PT orders received for PT evaluation and treatment: twisted ankle.  PT evaluation cancelled for this AM due to awaiting results from CT scan, ortho recs, and weight bearing orders.

## 2019-02-20 NOTE — PROGRESS NOTES
"02/20/19, 12:30, [id number] F314/F314-01, male, Height: 5' 9\", Weight: 223 lbs 0 oz, Ordered by: Vernell Ford, Acute Left Lateral Malleolus avulsion fx, [phone].    Subjective:  - Patient was seen today at F314/F314-01 for the evaluation/fit/delivery of a CAM Boot ([employer] Air Ankle Walker medium Ref #[id number]) on the left foot. Patient appears pleasant.   Health History & Progression:   - History reviewed. No pertinent surgical history..  Patient required a CAM boot to immobilize the left ankle. The CT scan of the left ankle revealed a small avulsion fx of the lateral malleolus with associated swelling and potential chronic trauma.  - Patient's history of utilizing a CAM Boot: no history of utilizing orthoses for ailment being addressed today.  - Patient is currently limited by: left ankle pain.    Objective:  - Patient is able to stand on his own volition.  - Contralateral LE presents as WNL currently.  - Sensory: Patient vocalized sensation as Normal in both upper and lower limbs.  - Appearance of limb & vasculature: Swollen left ankle.    Assessment:  - Patient can benefit from the CAM Boot because it will provide support of the foot, ankle, and tibia through pneumatic bladder total contact and control of ankle movement/tibial progression during gait. Patient s ailment recovery is expected to progress well with the utilization of the CAM Boot.   - Upon fitting the brace the patient vocalized satisfaction with the fit and feel of the orthosis. The trimlines and circumferences of the brace presented satisfactory around patient s foot, ankle and tibia to properly immobilize the ankle and foot.  - Verbal and written instructions were given to the patient on how to don/doff/care for the brace. Patient signed the delivery ticket and was given the [facility] receipt information sheet.    Goal:   - A pneumatic walking boot is functionally and medically necessary for reduction of pain in foot. A pneumatic " walker is used for variable volume total contact to reduce motion at the ankle, hind foot, mid foot and forefoot.    P:  - Patient was fit and delivered for the CAM Boot. Patient will utilize the orthosis for the duration of rehabilitation/PT in the hospital and at home activities upon discharge for the duration of treatment of patient ailment or until use of orthosis is no longer necessary. Will follow-up PRN.    Electronically signed by REBEL Ro, MSPO, Board Eligible Prosthetist.

## 2019-02-21 LAB
BASOPHILS # BLD AUTO: 0 10E9/L (ref 0–0.2)
BASOPHILS NFR BLD AUTO: 0.4 %
BILIRUB DIRECT SERPL-MCNC: 0.1 MG/DL (ref 0–0.2)
BILIRUB SERPL-MCNC: 0.4 MG/DL (ref 0.2–1.3)
DIFFERENTIAL METHOD BLD: ABNORMAL
EOSINOPHIL # BLD AUTO: 0.4 10E9/L (ref 0–0.7)
EOSINOPHIL NFR BLD AUTO: 7.1 %
ERYTHROCYTE [DISTWIDTH] IN BLOOD BY AUTOMATED COUNT: 13.4 % (ref 10–15)
HCT VFR BLD AUTO: 37.2 % (ref 40–53)
HGB BLD-MCNC: 12.6 G/DL (ref 13.3–17.7)
IMM GRANULOCYTES # BLD: 0 10E9/L (ref 0–0.4)
IMM GRANULOCYTES NFR BLD: 0.5 %
LYMPHOCYTES # BLD AUTO: 1.8 10E9/L (ref 0.8–5.3)
LYMPHOCYTES NFR BLD AUTO: 31.6 %
MCH RBC QN AUTO: 31 PG (ref 26.5–33)
MCHC RBC AUTO-ENTMCNC: 33.9 G/DL (ref 31.5–36.5)
MCV RBC AUTO: 92 FL (ref 78–100)
MONOCYTES # BLD AUTO: 0.4 10E9/L (ref 0–1.3)
MONOCYTES NFR BLD AUTO: 7.5 %
NEUTROPHILS # BLD AUTO: 3 10E9/L (ref 1.6–8.3)
NEUTROPHILS NFR BLD AUTO: 52.9 %
NRBC # BLD AUTO: 0 10*3/UL
NRBC BLD AUTO-RTO: 0 /100
PHOSPHATE SERPL-MCNC: 3.4 MG/DL (ref 2.5–4.5)
PLATELET # BLD AUTO: 75 10E9/L (ref 150–450)
RBC # BLD AUTO: 4.06 10E12/L (ref 4.4–5.9)
WBC # BLD AUTO: 5.6 10E9/L (ref 4–11)

## 2019-02-21 PROCEDURE — 25000132 ZZH RX MED GY IP 250 OP 250 PS 637: Performed by: CLINICAL NURSE SPECIALIST

## 2019-02-21 PROCEDURE — 82248 BILIRUBIN DIRECT: CPT | Performed by: NURSE PRACTITIONER

## 2019-02-21 PROCEDURE — 85025 COMPLETE CBC W/AUTO DIFF WBC: CPT | Performed by: NURSE PRACTITIONER

## 2019-02-21 PROCEDURE — 99231 SBSQ HOSP IP/OBS SF/LOW 25: CPT | Performed by: PSYCHIATRY & NEUROLOGY

## 2019-02-21 PROCEDURE — 40000893 ZZH STATISTIC PT IP EVAL DEFER: Performed by: PHYSICAL THERAPIST

## 2019-02-21 PROCEDURE — 12800008 ZZH R&B CD ADULT

## 2019-02-21 PROCEDURE — 82247 BILIRUBIN TOTAL: CPT | Performed by: NURSE PRACTITIONER

## 2019-02-21 PROCEDURE — 84100 ASSAY OF PHOSPHORUS: CPT | Performed by: NURSE PRACTITIONER

## 2019-02-21 PROCEDURE — 25000132 ZZH RX MED GY IP 250 OP 250 PS 637: Performed by: PSYCHIATRY & NEUROLOGY

## 2019-02-21 PROCEDURE — 36415 COLL VENOUS BLD VENIPUNCTURE: CPT | Performed by: NURSE PRACTITIONER

## 2019-02-21 PROCEDURE — 25000132 ZZH RX MED GY IP 250 OP 250 PS 637: Performed by: NURSE PRACTITIONER

## 2019-02-21 RX ORDER — CLONIDINE HYDROCHLORIDE 0.2 MG/1
0.2 TABLET ORAL 2 TIMES DAILY PRN
Status: DISCONTINUED | OUTPATIENT
Start: 2019-02-21 | End: 2019-02-27 | Stop reason: HOSPADM

## 2019-02-21 RX ORDER — OLANZAPINE 5 MG/1
5 TABLET, ORALLY DISINTEGRATING ORAL 3 TIMES DAILY PRN
Status: DISCONTINUED | OUTPATIENT
Start: 2019-02-21 | End: 2019-02-27 | Stop reason: HOSPADM

## 2019-02-21 RX ADMIN — IBUPROFEN 600 MG: 600 TABLET ORAL at 02:17

## 2019-02-21 RX ADMIN — IBUPROFEN 600 MG: 600 TABLET ORAL at 16:41

## 2019-02-21 RX ADMIN — THIAMINE HCL TAB 100 MG 100 MG: 100 TAB at 20:26

## 2019-02-21 RX ADMIN — ACETAMINOPHEN 325 MG: 325 TABLET, FILM COATED ORAL at 19:10

## 2019-02-21 RX ADMIN — DIBASIC SODIUM PHOSPHATE, MONOBASIC POTASSIUM PHOSPHATE AND MONOBASIC SODIUM PHOSPHATE 250 MG: 852; 155; 130 TABLET ORAL at 08:56

## 2019-02-21 RX ADMIN — TRIAMCINOLONE ACETONIDE: 1 CREAM TOPICAL at 08:56

## 2019-02-21 RX ADMIN — HYDROXYZINE HYDROCHLORIDE 25 MG: 25 TABLET ORAL at 19:10

## 2019-02-21 RX ADMIN — HYDROXYZINE HYDROCHLORIDE 25 MG: 25 TABLET ORAL at 00:40

## 2019-02-21 RX ADMIN — FOLIC ACID 1 MG: 1 TABLET ORAL at 08:56

## 2019-02-21 RX ADMIN — THIAMINE HCL TAB 100 MG 100 MG: 100 TAB at 08:56

## 2019-02-21 RX ADMIN — DIAZEPAM 10 MG: 5 TABLET ORAL at 02:17

## 2019-02-21 RX ADMIN — DIAZEPAM 10 MG: 5 TABLET ORAL at 01:31

## 2019-02-21 RX ADMIN — LEVETIRACETAM 1000 MG: 500 TABLET, FILM COATED ORAL at 20:26

## 2019-02-21 RX ADMIN — DIAZEPAM 20 MG: 5 TABLET ORAL at 00:39

## 2019-02-21 RX ADMIN — DIBASIC SODIUM PHOSPHATE, MONOBASIC POTASSIUM PHOSPHATE AND MONOBASIC SODIUM PHOSPHATE 250 MG: 852; 155; 130 TABLET ORAL at 20:26

## 2019-02-21 RX ADMIN — HYDROXYZINE HYDROCHLORIDE 25 MG: 25 TABLET ORAL at 04:42

## 2019-02-21 RX ADMIN — LEVETIRACETAM 1000 MG: 500 TABLET, FILM COATED ORAL at 08:56

## 2019-02-21 RX ADMIN — DIAZEPAM 10 MG: 5 TABLET ORAL at 04:42

## 2019-02-21 RX ADMIN — MULTIPLE VITAMINS W/ MINERALS TAB 1 TABLET: TAB at 08:56

## 2019-02-21 RX ADMIN — TRAZODONE HYDROCHLORIDE 50 MG: 50 TABLET ORAL at 20:56

## 2019-02-21 RX ADMIN — TRIAMCINOLONE ACETONIDE: 1 CREAM TOPICAL at 20:57

## 2019-02-21 ASSESSMENT — ACTIVITIES OF DAILY LIVING (ADL)
DRESS: SCRUBS (BEHAVIORAL HEALTH)
ORAL_HYGIENE: INDEPENDENT
LAUNDRY: WITH SUPERVISION
HYGIENE/GROOMING: INDEPENDENT

## 2019-02-21 NOTE — PROGRESS NOTES
"[facility], [place]   Psychiatric Progress Note    Interim history   This is a 48 year old male with alcohol use dx severe.Pt seen in rounds. Patient's mood is good  Energy Level:MODERATE  Sleep:No concerns, sleeps well through night  Appetite:normal motivation interest improving   denied any Suicidal/homicidal ideation/plan intent.  Denied any psychosis  No prior suicde attempts  No access to gun  Pt is in detox  Pt mssa score are monitered  Tolerating meds and has no side effects.              Medications:     Current Facility-Administered Medications   Medication     acetaminophen (TYLENOL) tablet 325 mg     alum & mag hydroxide-simethicone (MYLANTA ES/MAALOX  ES) suspension 30 mL     atenolol (TENORMIN) tablet 50 mg     bisacodyl (DULCOLAX) Suppository 10 mg     cloNIDine (CATAPRES) tablet 0.1 mg     diazepam (VALIUM) tablet 5-20 mg     folic acid (FOLVITE) tablet 1 mg     hydrOXYzine (ATARAX) tablet 25 mg     ibuprofen (ADVIL/MOTRIN) tablet 600 mg     levETIRAcetam (KEPPRA) tablet 1,000 mg     magnesium hydroxide (MILK OF MAGNESIA) suspension 30 mL     multivitamin w/minerals (THERA-VIT-M) tablet 1 tablet     OLANZapine zydis (zyPREXA) ODT tab 5 mg     ondansetron (ZOFRAN-ODT) ODT tab 4-8 mg     phosphorus tablet 250 mg (PHOSPHA 250 NEUTRAL) per tablet 250 mg     traZODone (DESYREL) tablet 50 mg     triamcinolone (KENALOG) 0.1 % cream     vitamin B-12 (CYANOCOBALAMIN) injection 1,000 mcg     vitamin B1 (THIAMINE) tablet 100 mg             Allergies:     Allergies   Allergen Reactions     Penicillins Hives and Rash            Psychiatric Examination:   Blood pressure (!) 133/97, pulse 71, temperature 97  F (36.1  C), temperature source Tympanic, resp. rate 16, height 1.753 m (5' 9\"), weight 101.2 kg (223 lb), SpO2 99 %.  Weight is 223 lbs 0 oz  Body mass index is 32.93 kg/m .    Appearance:  awake, alert and adequately groomed  Attitude:  cooperative  Eye Contact:  good  Mood:  " "better  Affect:  appropriate and in normal range and mood congruent  Speech:  clear, coherent rate /rhythm are good  Psychomotor Behavior:  no evidence of tardive dyskinesia, dystonia, or tics and intact station, gait and muscle tone  Throught Process:  logical  Associations:  no loose associations  Thought Content:  no evidence of suicidal ideation or homicidal ideation, no evidence of psychotic thought, no auditory hallucinations present and no visual hallucinations present  Insight:  good  Judgement:  intact  Oriented to:  time, person, and place  Attention Span and Concentration:  intact  Recent and Remote Memory:  intact  Language fund of knowledge are adequate         Labs:     No results found for: NTBNPI, NTBNP  Lab Results   Component Value Date    WBC 5.6 02/21/2019    HGB 12.6 (L) 02/21/2019    HCT 37.2 (L) 02/21/2019    MCV 92 02/21/2019    PLT 75 (L) 02/21/2019     Lab Results   Component Value Date    TSH 0.97 02/18/2019          DIAGNOSIS:   Axis I:  Alcohol use disorder, severe.      PLAN:  The patient will be detoxed off alcohol using MSSA protocol and Valium.      Seen by nutrition consult  INTERVENTIONS  Implementation  -Nutrition Education: Unable d/t pt asleep.   -Medical food supplement therapy: As above.     Goals  Patient to consume % of nutritionally adequate meal trays TID, or the equivalent with supplements/snacks.     Monitoring/Evaluation  Progress toward goals will be monitored and evaluated per protocol    Seen by orthotist  02/20/19, 12:30, [facility] UR3AFH F314/F314-01, male, Height: 5' 9\", Weight: 223 lbs 0 oz, Ordered by: Vernell Ford, Acute Left Lateral Malleolus avulsion fx, [id number].     Subjective:    Patient was seen today at 14/F314-01 for the evaluation/fit/delivery of a CAM Boot (Maxtrax Air Ankle Walker medium Ref #[id number]) on the left foot. Patient appears pleasant.   Health History & Progression:       Past Surgical History   History reviewed. No " pertinent surgical history.   .  Patient required a CAM boot to immobilize the left ankle. The CT scan of the left ankle revealed a small avulsion fx of the lateral malleolus with associated swelling and potential chronic trauma.    Patient's history of utilizing a CAM Boot: no history of utilizing orthoses for ailment being addressed today.    Patient is currently limited by: left ankle pain.     Objective:    Patient is able to stand on his own volition.    Contralateral LE presents as WNL currently.    Sensory: Patient vocalized sensation as Normal in both upper and lower limbs.    Appearance of limb & vasculature: Swollen left ankle.     Assessment:    Patient can benefit from the CAM Boot because it will provide support of the foot, ankle, and tibia through pneumatic bladder total contact and control of ankle movement/tibial progression during gait. Patient s ailment recovery is expected to progress well with the utilization of the CAM Boot.     Upon fitting the brace the patient vocalized satisfaction with the fit and feel of the orthosis. The trimlines and circumferences of the brace presented satisfactory around patient s foot, ankle and tibia to properly immobilize the ankle and foot.    Verbal and written instructions were given to the patient on how to don/doff/care for the brace. Patient signed the delivery ticket and was given the [employer] receipt information sheet.     Goal:     A pneumatic walking boot is functionally and medically necessary for reduction of pain in foot. A pneumatic walker is used for variable volume total contact to reduce motion at the ankle, hind foot, mid foot and forefoot.     P:    Patient was fit and delivered for the CAM Boot. Patient will utilize the orthosis for the duration of rehabilitation/PT in the hospital and at home activities upon discharge for the duration of treatment of patient ailment or until use of orthosis is no longer necessary. Will follow-up  PRN.                 Laboratory/Imaging: reviewed with patient   Consults: internal medicine consult reviewed  Patient will be treated in therapeutic milieu with appropriate individual and group therapies as described.  PDMP CHECKED     Medical diagnoses to be addressed this admission:    Plan:  Brief Internal Medicine Note, 2/20/19:     IM following for complaints of L ankle pain.      Discussed CT ankle on 2/19 w/ radiology, as final read is not yet complete.  On preliminary review, pt appears to have small avulsion fracture of the lateral malleolus.  Will await final read and discuss further with Ortho.       Addendum:   Final read of CT left ankle with small avulsion fracture involving the lateral malleolus and soft tissue swelling the lateral greater than medial aspect of the ankle.  Also noted additional area of bony proliferation favored to represent early osteophytic spurring or sequela of chronic trauma.  Discussed with Ortho resident, recommend Orthotics consult for CAM boot and follow up in walk-in Orthopedic clinic in 6 weeks for repeat XR of ankle.       For now, continue to encourage pt to keep L foot elevated when at rest and ice packs for 20min at a time as needed throughout the day.  Continue NWB to LLE until CAM boot in place.  Can alternate Tylenol and Ibuprofen as ordered for pain PRN.                Legal Status: voluntary    Safety Assessment:   Checks:  15 min  Precautions: withdrawal precautions  Pt has not required locked seclusion or restraints in the past 24 hours to maintain safety, please refer to RN documentation for further details.  Discussed with patient many issues of addiction,triggers, relapse, and establishing a solid recovery program.  Able to give informed consent:  YES   Discussed Risks/Benefits/Side Effects/Alternatives: YES    After discussion of the indications, risks, benefits, alternatives and consequences of no treatment, the patient elects to

## 2019-02-21 NOTE — PROGRESS NOTES
"Pt. Anxious, agitated, hallucinating, and displaying confusion.  Pt. Was in room looking for \"something,\" although he could not identify what he was looking for.  Pt. Stated to writer that writer should turn off the television.  He stated he was hearing the [employer] channel. There is no television in patient's room. Pt became agitated stating \"there is nothing here, I can't do this anymore.\"  He repeated this sentence while searching for \"something.\" pt appeared to be in delirium tremens. MSSA 20, 15 &  13; 20 x 1, 10 X 3 of valium given.  Provider notified and zyprexa prn ordered.  "

## 2019-02-21 NOTE — PLAN OF CARE
"Denies anxiety/depression \"no, I actually feel pretty good\". Denies any suicidal ideation plans or intent. Does report that he likes to walk and wearing the cam boot is uncomfortable.   "

## 2019-02-21 NOTE — PROGRESS NOTES
Placed calls to [facility] and [facility] regarding assessment and referral. Waiting for return calls.

## 2019-02-21 NOTE — PROGRESS NOTES
"Pt upset this afternoon about not having any shoes \"she told me she was going to bring me shoes\". Informed him that in my experience this is not a common practice for the hospitalized patient. Lost and found was checked but there are no shoes here on the unit that will fit his feet. Pt seemed to accept this information and then calmed himself.    When doing his afternoon MSSA check he was asked if he was hallucinating and then he seemed to imitate that he was talking to someone in the lounge (lounge area was empty). Pt then laughed and denies any hallucinations. He is alert and oriented and independent on his feet.     Mother Meghan Aguilera called (pt signed EARLENE for his Mother) and was updated about his condition. She states he has a history of being a binge drinker and was in [facility] in [place] for treatment followed up by sober housing in the [facility] system in [place] and he left and went to [place] to drink and \"he was probably drinking for 2 full weeks, he's a binge drinker\" who \"just about  a couple times\". She reports she will bring him clothes and belongings tomorrow between 10:30-11:00 am and at that time would like to speak with his .  "

## 2019-02-21 NOTE — PROGRESS NOTES
Pt. Calm and cooperative; Out in milieu, social and appropriate with peers.  Pt. Denies hallucinations and is oriented x 4. Pt would like to see orthopedic about the boot for his injured foot because he does not want to wear the boot.

## 2019-02-22 LAB
ERYTHROCYTE [DISTWIDTH] IN BLOOD BY AUTOMATED COUNT: 13.7 % (ref 10–15)
HCT VFR BLD AUTO: 39.6 % (ref 40–53)
HGB BLD-MCNC: 13.1 G/DL (ref 13.3–17.7)
MCH RBC QN AUTO: 31.1 PG (ref 26.5–33)
MCHC RBC AUTO-ENTMCNC: 33.1 G/DL (ref 31.5–36.5)
MCV RBC AUTO: 94 FL (ref 78–100)
PLATELET # BLD AUTO: 96 10E9/L (ref 150–450)
RBC # BLD AUTO: 4.21 10E12/L (ref 4.4–5.9)
WBC # BLD AUTO: 5.5 10E9/L (ref 4–11)

## 2019-02-22 PROCEDURE — 25000132 ZZH RX MED GY IP 250 OP 250 PS 637: Performed by: CLINICAL NURSE SPECIALIST

## 2019-02-22 PROCEDURE — 99231 SBSQ HOSP IP/OBS SF/LOW 25: CPT | Performed by: PSYCHIATRY & NEUROLOGY

## 2019-02-22 PROCEDURE — H2032 ACTIVITY THERAPY, PER 15 MIN: HCPCS

## 2019-02-22 PROCEDURE — 25000132 ZZH RX MED GY IP 250 OP 250 PS 637: Performed by: PSYCHIATRY & NEUROLOGY

## 2019-02-22 PROCEDURE — 85027 COMPLETE CBC AUTOMATED: CPT | Performed by: NURSE PRACTITIONER

## 2019-02-22 PROCEDURE — 25000132 ZZH RX MED GY IP 250 OP 250 PS 637: Performed by: NURSE PRACTITIONER

## 2019-02-22 PROCEDURE — 12800008 ZZH R&B CD ADULT

## 2019-02-22 PROCEDURE — 36415 COLL VENOUS BLD VENIPUNCTURE: CPT | Performed by: NURSE PRACTITIONER

## 2019-02-22 RX ORDER — LANOLIN ALCOHOL/MO/W.PET/CERES
100 CREAM (GRAM) TOPICAL 2 TIMES DAILY
Qty: 60 TABLET | Refills: 0 | Status: SHIPPED | OUTPATIENT
Start: 2019-02-22 | End: 2019-03-24

## 2019-02-22 RX ORDER — FLUTICASONE PROPIONATE 50 MCG
1 SPRAY, SUSPENSION (ML) NASAL DAILY
Qty: 1 BOTTLE | Refills: 0 | Status: SHIPPED | OUTPATIENT
Start: 2019-02-22 | End: 2019-03-24

## 2019-02-22 RX ORDER — CYANOCOBALAMIN 1000 UG/ML
1000 INJECTION, SOLUTION INTRAMUSCULAR; SUBCUTANEOUS
Qty: 1 ML | Refills: 0 | Status: SHIPPED | OUTPATIENT
Start: 2019-03-21 | End: 2019-04-20

## 2019-02-22 RX ORDER — TRAZODONE HYDROCHLORIDE 50 MG/1
50 TABLET, FILM COATED ORAL
Qty: 30 TABLET | Refills: 0 | Status: SHIPPED | OUTPATIENT
Start: 2019-02-22 | End: 2019-11-11

## 2019-02-22 RX ORDER — MULTIPLE VITAMINS W/ MINERALS TAB 9MG-400MCG
1 TAB ORAL DAILY
Qty: 30 TABLET | Refills: 0 | Status: SHIPPED | OUTPATIENT
Start: 2019-02-23 | End: 2019-03-25

## 2019-02-22 RX ORDER — LEVETIRACETAM 1000 MG/1
1000 TABLET ORAL 2 TIMES DAILY
Qty: 60 TABLET | Refills: 0 | Status: SHIPPED | OUTPATIENT
Start: 2019-02-22 | End: 2019-05-08

## 2019-02-22 RX ORDER — FOLIC ACID 1 MG/1
1 TABLET ORAL DAILY
Qty: 30 TABLET | Refills: 0 | Status: SHIPPED | OUTPATIENT
Start: 2019-02-23 | End: 2019-03-25

## 2019-02-22 RX ORDER — HYDROXYZINE HYDROCHLORIDE 25 MG/1
25 TABLET, FILM COATED ORAL EVERY 4 HOURS PRN
Qty: 30 TABLET | Refills: 0 | Status: SHIPPED | OUTPATIENT
Start: 2019-02-22 | End: 2019-03-04

## 2019-02-22 RX ORDER — TRIAMCINOLONE ACETONIDE 1 MG/G
CREAM TOPICAL 2 TIMES DAILY
Qty: 30 G | Refills: 0 | Status: SHIPPED | OUTPATIENT
Start: 2019-02-22 | End: 2019-03-24

## 2019-02-22 RX ADMIN — LEVETIRACETAM 1000 MG: 500 TABLET, FILM COATED ORAL at 19:56

## 2019-02-22 RX ADMIN — IBUPROFEN 600 MG: 600 TABLET ORAL at 16:33

## 2019-02-22 RX ADMIN — IBUPROFEN 600 MG: 600 TABLET ORAL at 05:48

## 2019-02-22 RX ADMIN — MULTIPLE VITAMINS W/ MINERALS TAB 1 TABLET: TAB at 08:22

## 2019-02-22 RX ADMIN — HYDROXYZINE HYDROCHLORIDE 25 MG: 25 TABLET ORAL at 01:12

## 2019-02-22 RX ADMIN — FOLIC ACID 1 MG: 1 TABLET ORAL at 08:22

## 2019-02-22 RX ADMIN — THIAMINE HCL TAB 100 MG 100 MG: 100 TAB at 08:22

## 2019-02-22 RX ADMIN — TRAZODONE HYDROCHLORIDE 50 MG: 50 TABLET ORAL at 01:12

## 2019-02-22 RX ADMIN — ACETAMINOPHEN 325 MG: 325 TABLET, FILM COATED ORAL at 08:22

## 2019-02-22 RX ADMIN — THIAMINE HCL TAB 100 MG 100 MG: 100 TAB at 19:56

## 2019-02-22 RX ADMIN — LEVETIRACETAM 1000 MG: 500 TABLET, FILM COATED ORAL at 08:22

## 2019-02-22 ASSESSMENT — ACTIVITIES OF DAILY LIVING (ADL)
HYGIENE/GROOMING: INDEPENDENT
ORAL_HYGIENE: INDEPENDENT
ORAL_HYGIENE: INDEPENDENT
DRESS: SCRUBS (BEHAVIORAL HEALTH)
LAUNDRY: WITH SUPERVISION
HYGIENE/GROOMING: INDEPENDENT

## 2019-02-22 NOTE — PROGRESS NOTES
Pt was calm and cooperative this shift. He was social with peers and had a bright affect. Pt requested PRN ibuprofen X2 for ankle pain, was advised to elevate extremity, and was given a cold pack. Pt states he is excited for his mother to bring him some things tomorrow including shoes. Pt continues to have a high BP, but not within the parameters for PRN clonidine. Pt received hydroxyzine once for anxiety. Pt scored 4,3 on MSSA. Pt's last dose of valium was given 2/21/19 0442.

## 2019-02-22 NOTE — PROGRESS NOTES
Writer placed second calls to [facility] and [facility]. Left voicemail messages with both programs. Waiting for return calls to discuss admission.    Update: Received voicemail  from Sung at [facility]. Sung stated that there are some pending upcoming discharges and that there may be availability for pt to admit to [facility], did not give any specific time frame though.  Sung reported on voicemail that she would like to set up a phone interview with pt to move forward. Writer placed return call to Sung at [phone], left voicemail message informing her that pt is able to do phone interview. Provided Sung with unit phone number so that she may reach out to pt. Requested return call to discuss details when phone interview is complete. Writer updated pt on the above information, pt seems anxious about where he will be going. Pt asked many questions and writer thoroughly explained to pt the next steps. Writer will update pt with any new information as it becomes available.

## 2019-02-23 PROCEDURE — 12800008 ZZH R&B CD ADULT

## 2019-02-23 PROCEDURE — 25000132 ZZH RX MED GY IP 250 OP 250 PS 637: Performed by: CLINICAL NURSE SPECIALIST

## 2019-02-23 PROCEDURE — 25000132 ZZH RX MED GY IP 250 OP 250 PS 637: Performed by: PSYCHIATRY & NEUROLOGY

## 2019-02-23 RX ADMIN — LEVETIRACETAM 1000 MG: 500 TABLET, FILM COATED ORAL at 08:41

## 2019-02-23 RX ADMIN — FOLIC ACID 1 MG: 1 TABLET ORAL at 08:37

## 2019-02-23 RX ADMIN — TRIAMCINOLONE ACETONIDE: 1 CREAM TOPICAL at 20:09

## 2019-02-23 RX ADMIN — MULTIPLE VITAMINS W/ MINERALS TAB 1 TABLET: TAB at 08:37

## 2019-02-23 RX ADMIN — IBUPROFEN 600 MG: 600 TABLET ORAL at 08:37

## 2019-02-23 RX ADMIN — HYDROXYZINE HYDROCHLORIDE 25 MG: 25 TABLET ORAL at 20:06

## 2019-02-23 RX ADMIN — TRAZODONE HYDROCHLORIDE 50 MG: 50 TABLET ORAL at 22:22

## 2019-02-23 RX ADMIN — IBUPROFEN 600 MG: 600 TABLET ORAL at 16:12

## 2019-02-23 RX ADMIN — LEVETIRACETAM 1000 MG: 500 TABLET, FILM COATED ORAL at 20:06

## 2019-02-23 RX ADMIN — HYDROXYZINE HYDROCHLORIDE 25 MG: 25 TABLET ORAL at 16:12

## 2019-02-23 RX ADMIN — THIAMINE HCL TAB 100 MG 100 MG: 100 TAB at 20:06

## 2019-02-23 RX ADMIN — THIAMINE HCL TAB 100 MG 100 MG: 100 TAB at 08:37

## 2019-02-23 ASSESSMENT — ACTIVITIES OF DAILY LIVING (ADL)
HYGIENE/GROOMING: INDEPENDENT
ORAL_HYGIENE: INDEPENDENT
LAUNDRY: WITH SUPERVISION
HYGIENE/GROOMING: INDEPENDENT
ORAL_HYGIENE: INDEPENDENT
DRESS: STREET CLOTHES

## 2019-02-23 NOTE — PROGRESS NOTES
Social with peers and visible in the milieu. Resting in bed after supper. Gait slow and steady. Did ask for and received motrin with good results of ankle pain. Eating well ans VSS.

## 2019-02-23 NOTE — PROGRESS NOTES
Brief Internal Medicine Note, 2/23/19:    CBC reviewed, cell cts uptrending.      Medicine will sign off, no further recommendations at this time.  Please feel free to reconsult if patient's symptoms worsen or if new problems arise.  Thank you for the opportunity to care for this patient.         Vernell Ford [facility]  Hospitalist Service   Pager: [phone]

## 2019-02-23 NOTE — PROGRESS NOTES
02/22/19 2100   General Information   Art Directive other (see comments)   Task Orientation    Task orientation skills calm and focused;follows instruction   Social Interaction   Social interaction skills maintains boundaries;shares appropriately   Product/Content   Product/Content image reflects current feelings;image reflects positive aspects   AT directive was to create a safe place drawing and to identify five items within safe place that represent the five senses. Goals of directive: trauma containment, emotional expression.    Pt was a positive participant, focused on task for the full duration of group. Pt steven an image of a deer stand and identified five positive senses within safe place.

## 2019-02-23 NOTE — PLAN OF CARE
Patient has not required any valium for alcohol detox since 02/21/19 at 0442. All MSSA scores since that time have been less than 8. Pt is now removed from alcohol detox monitoring status.

## 2019-02-24 PROCEDURE — 25000132 ZZH RX MED GY IP 250 OP 250 PS 637: Performed by: NURSE PRACTITIONER

## 2019-02-24 PROCEDURE — 12800008 ZZH R&B CD ADULT

## 2019-02-24 PROCEDURE — 25000132 ZZH RX MED GY IP 250 OP 250 PS 637: Performed by: CLINICAL NURSE SPECIALIST

## 2019-02-24 PROCEDURE — 25000132 ZZH RX MED GY IP 250 OP 250 PS 637: Performed by: PSYCHIATRY & NEUROLOGY

## 2019-02-24 RX ADMIN — ACETAMINOPHEN 325 MG: 325 TABLET, FILM COATED ORAL at 13:23

## 2019-02-24 RX ADMIN — THIAMINE HCL TAB 100 MG 100 MG: 100 TAB at 08:40

## 2019-02-24 RX ADMIN — IBUPROFEN 600 MG: 600 TABLET ORAL at 15:53

## 2019-02-24 RX ADMIN — THIAMINE HCL TAB 100 MG 100 MG: 100 TAB at 20:02

## 2019-02-24 RX ADMIN — TRIAMCINOLONE ACETONIDE: 1 CREAM TOPICAL at 20:05

## 2019-02-24 RX ADMIN — HYDROXYZINE HYDROCHLORIDE 25 MG: 25 TABLET ORAL at 15:53

## 2019-02-24 RX ADMIN — LEVETIRACETAM 1000 MG: 500 TABLET, FILM COATED ORAL at 20:02

## 2019-02-24 RX ADMIN — LEVETIRACETAM 1000 MG: 500 TABLET, FILM COATED ORAL at 08:42

## 2019-02-24 RX ADMIN — FOLIC ACID 1 MG: 1 TABLET ORAL at 08:40

## 2019-02-24 RX ADMIN — MULTIPLE VITAMINS W/ MINERALS TAB 1 TABLET: TAB at 08:40

## 2019-02-24 ASSESSMENT — ACTIVITIES OF DAILY LIVING (ADL)
HYGIENE/GROOMING: INDEPENDENT
ORAL_HYGIENE: INDEPENDENT
DRESS: STREET CLOTHES
HYGIENE/GROOMING: INDEPENDENT
ORAL_HYGIENE: INDEPENDENT
LAUNDRY: WITH SUPERVISION

## 2019-02-24 NOTE — PROGRESS NOTES
RESTRICTION INFORMATION:    According to Ewa at the [place] restricted recipient program the correct contact information regarding lifting his restrictions is attached to [facility] at [phone]. Fax number [phone]. A message was left on their voicemail regarding the need to get his restriction lifted for the provider to allow Dr. Stringer authorization to prescribe and for the pharmacy to allow the medications to be filled here at [facility]. Explained on voicemail that time is of the essence on the matter.    Pt signed an EARLENE for his provider Maury Cisse and that is now in the front of his chart. Called and spoke to the answering service for his restricted provide Maury Cisse at [facility] in effort to have her office allow Dr. Stringer prescribing rights so his medications can be filled. Per the service a note is attached to his chart regarding lifting the restriction. Their number is [phone].

## 2019-02-24 NOTE — PROGRESS NOTES
Vital signs stable at 1600. Did request hydroxizine x 2 for restlessness and some anxiety and patient states this was helpful. Gait steady and attened AA meeting and has been playing cards with peers.

## 2019-02-25 PROCEDURE — 12800008 ZZH R&B CD ADULT

## 2019-02-25 PROCEDURE — 25000132 ZZH RX MED GY IP 250 OP 250 PS 637: Performed by: CLINICAL NURSE SPECIALIST

## 2019-02-25 PROCEDURE — 25000132 ZZH RX MED GY IP 250 OP 250 PS 637: Performed by: PSYCHIATRY & NEUROLOGY

## 2019-02-25 PROCEDURE — 99231 SBSQ HOSP IP/OBS SF/LOW 25: CPT | Performed by: PSYCHIATRY & NEUROLOGY

## 2019-02-25 RX ADMIN — FOLIC ACID 1 MG: 1 TABLET ORAL at 08:46

## 2019-02-25 RX ADMIN — THIAMINE HCL TAB 100 MG 100 MG: 100 TAB at 20:38

## 2019-02-25 RX ADMIN — THIAMINE HCL TAB 100 MG 100 MG: 100 TAB at 08:46

## 2019-02-25 RX ADMIN — LEVETIRACETAM 1000 MG: 500 TABLET, FILM COATED ORAL at 08:46

## 2019-02-25 RX ADMIN — LEVETIRACETAM 1000 MG: 500 TABLET, FILM COATED ORAL at 20:38

## 2019-02-25 RX ADMIN — IBUPROFEN 600 MG: 600 TABLET ORAL at 04:59

## 2019-02-25 RX ADMIN — MULTIPLE VITAMINS W/ MINERALS TAB 1 TABLET: TAB at 08:46

## 2019-02-25 ASSESSMENT — ACTIVITIES OF DAILY LIVING (ADL)
HYGIENE/GROOMING: INDEPENDENT
ORAL_HYGIENE: INDEPENDENT
DRESS: STREET CLOTHES

## 2019-02-25 NOTE — PROGRESS NOTES
"[facility], [place]   Psychiatric Progress Note    Interim history   This is a 48 year old male with alcohol use dx severe.Pt seen in rounds. Patient's mood is good    Energy Level:MODERATE  Sleep:No concerns, sleeps well through night  Appetite:normal motivation interest improving   denied any Suicidal/homicidal ideation/plan intent.  Denied any psychosis  No prior suicde attempts  No access to gun     Tolerating meds and has no side effects.              Medications:     Current Facility-Administered Medications   Medication     acetaminophen (TYLENOL) tablet 325 mg     alum & mag hydroxide-simethicone (MYLANTA ES/MAALOX  ES) suspension 30 mL     atenolol (TENORMIN) tablet 50 mg     bisacodyl (DULCOLAX) Suppository 10 mg     cloNIDine (CATAPRES) tablet 0.2 mg     diazepam (VALIUM) tablet 5-20 mg     folic acid (FOLVITE) tablet 1 mg     hydrOXYzine (ATARAX) tablet 25 mg     ibuprofen (ADVIL/MOTRIN) tablet 600 mg     levETIRAcetam (KEPPRA) tablet 1,000 mg     magnesium hydroxide (MILK OF MAGNESIA) suspension 30 mL     multivitamin w/minerals (THERA-VIT-M) tablet 1 tablet     OLANZapine zydis (zyPREXA) ODT tab 5 mg     ondansetron (ZOFRAN-ODT) ODT tab 4-8 mg     traZODone (DESYREL) tablet 50 mg     vitamin B-12 (CYANOCOBALAMIN) injection 1,000 mcg     vitamin B1 (THIAMINE) tablet 100 mg             Allergies:     Allergies   Allergen Reactions     Penicillins Hives and Rash            Psychiatric Examination:   Blood pressure 123/84, pulse 68, temperature 97  F (36.1  C), temperature source Oral, resp. rate 16, height 1.753 m (5' 9\"), weight 101.2 kg (223 lb), SpO2 99 %.  Weight is 223 lbs 0 oz  Body mass index is 32.93 kg/m .    Appearance:  awake, alert and adequately groomed  Attitude:  cooperative  Eye Contact:  good  Mood:  better  Affect:  appropriate and in normal range and mood congruent  Speech:  clear, coherent rate /rhythm are good  Psychomotor Behavior:  no evidence of tardive " "dyskinesia, dystonia, or tics and intact station, gait and muscle tone  Throught Process:  logical  Associations:  no loose associations  Thought Content:  no evidence of suicidal ideation or homicidal ideation, no evidence of psychotic thought, no auditory hallucinations present and no visual hallucinations present  Insight:  good  Judgement:  intact  Oriented to:  time, person, and place  Attention Span and Concentration:  intact  Recent and Remote Memory:  intact  Language fund of knowledge are adequate         Labs:     No results found for: NTBNPI, NTBNP  Lab Results   Component Value Date    WBC 5.5 02/22/2019    HGB 13.1 (L) 02/22/2019    HCT 39.6 (L) 02/22/2019    MCV 94 02/22/2019    PLT 96 (L) 02/22/2019     Lab Results   Component Value Date    TSH 0.97 02/18/2019          DIAGNOSIS:   Axis I:  Alcohol use disorder, severe.      PLAN:    Pt is out of alcohol detox     Seen by nutrition consult  INTERVENTIONS  Implementation  -Nutrition Education: Unable d/t pt asleep.   -Medical food supplement therapy: As above.     Goals  Patient to consume % of nutritionally adequate meal trays TID, or the equivalent with supplements/snacks.     Monitoring/Evaluation  Progress toward goals will be monitored and evaluated per protocol    Seen by orthotist  02/20/19, 12:30, [facility] UR3AFH F314/F314-01, male, Height: 5' 9\", Weight: 223 lbs 0 oz, Ordered by: Vernell Ford, Acute Left Lateral Malleolus avulsion fx, [id number].     Subjective:    Patient was seen today at 14/F314-01 for the evaluation/fit/delivery of a CAM Boot (Maxtrax Air Ankle Walker medium Ref #[id number]) on the left foot. Patient appears pleasant.   Health History & Progression:       Past Surgical History   History reviewed. No pertinent surgical history.   .  Patient required a CAM boot to immobilize the left ankle. The CT scan of the left ankle revealed a small avulsion fx of the lateral malleolus with associated swelling and " potential chronic trauma.    Patient's history of utilizing a CAM Boot: no history of utilizing orthoses for ailment being addressed today.    Patient is currently limited by: left ankle pain.     Objective:    Patient is able to stand on his own volition.    Contralateral LE presents as WNL currently.    Sensory: Patient vocalized sensation as Normal in both upper and lower limbs.    Appearance of limb & vasculature: Swollen left ankle.     Assessment:    Patient can benefit from the CAM Boot because it will provide support of the foot, ankle, and tibia through pneumatic bladder total contact and control of ankle movement/tibial progression during gait. Patient s ailment recovery is expected to progress well with the utilization of the CAM Boot.     Upon fitting the brace the patient vocalized satisfaction with the fit and feel of the orthosis. The trimlines and circumferences of the brace presented satisfactory around patient s foot, ankle and tibia to properly immobilize the ankle and foot.    Verbal and written instructions were given to the patient on how to don/doff/care for the brace. Patient signed the delivery ticket and was given the [employer] receipt information sheet.     Goal:     A pneumatic walking boot is functionally and medically necessary for reduction of pain in foot. A pneumatic walker is used for variable volume total contact to reduce motion at the ankle, hind foot, mid foot and forefoot.     P:    Patient was fit and delivered for the CAM Boot. Patient will utilize the orthosis for the duration of rehabilitation/PT in the hospital and at home activities upon discharge for the duration of treatment of patient ailment or until use of orthosis is no longer necessary. Will follow-up PRN.                 Laboratory/Imaging: reviewed with patient   Consults: internal medicine consult reviewed  Patient will be treated in therapeutic milieu with appropriate individual and group therapies as  described.  PDMP CHECKED     Medical diagnoses to be addressed this admission:    Plan:  Brief Internal Medicine Note, 2/20/19:     IM following for complaints of L ankle pain.      Discussed CT ankle on 2/19 w/ radiology, as final read is not yet complete.  On preliminary review, pt appears to have small avulsion fracture of the lateral malleolus.  Will await final read and discuss further with Ortho.       Addendum:   Final read of CT left ankle with small avulsion fracture involving the lateral malleolus and soft tissue swelling the lateral greater than medial aspect of the ankle.  Also noted additional area of bony proliferation favored to represent early osteophytic spurring or sequela of chronic trauma.  Discussed with Ortho resident, recommend Orthotics consult for CAM boot and follow up in walk-in Orthopedic clinic in 6 weeks for repeat XR of ankle.       For now, continue to encourage pt to keep L foot elevated when at rest and ice packs for 20min at a time as needed throughout the day.  Continue NWB to LLE until CAM boot in place.  Can alternate Tylenol and Ibuprofen as ordered for pain PRN.                Legal Status: voluntary    Safety Assessment:   Checks:  15 min  Precautions: withdrawal precautions  Pt has not required locked seclusion or restraints in the past 24 hours to maintain safety, please refer to RN documentation for further details.  Discussed with patient many issues of addiction,triggers, relapse, and establishing a solid recovery program.  Able to give informed consent:  YES   Discussed Risks/Benefits/Side Effects/Alternatives: YES    After discussion of the indications, risks, benefits, alternatives and consequences of no treatment, the patient elects to complete detox and do trt.

## 2019-02-25 NOTE — PROGRESS NOTES
Requested hydroxizine and motrin at 1600 for anxiety and foot discomfort. sliding scale. Eating well. Did go to  AA. Visible in the lounge much of the pm.

## 2019-02-25 NOTE — PROGRESS NOTES
Placed call to Sung at [facility], Sung reports that Vani will be contacting pt to do phone interview today. Writer received call from Jaison at [facility], he will call pt today to do phone interview also.   Jaison reports bed is available on Tuesday 2/26 if phone interview goes well. Pt was informed and is expecting and prepared for the calls.  CM continues.

## 2019-02-25 NOTE — PROGRESS NOTES
Shyanne from [facility] called in regards to voicemail from over the weekend. Shyanne updated that we don't need anything from their provider since [employer] has already been notified and restriction is lifted.

## 2019-02-25 NOTE — PROGRESS NOTES
Writer spoke to Kavon from [employer] (Restrictions Dept) [phone].    The restriction is lifted. Dr Stringer is authorized to prescribe medications and pt will receive them from [facility] pharmacy effective today.    Per Kavon from [employer], once pt is discharged, please notify [employer] (Restrictions Dept) so they can change his information back to original pharmacy.      Thank you.

## 2019-02-26 PROCEDURE — 25000132 ZZH RX MED GY IP 250 OP 250 PS 637: Performed by: PSYCHIATRY & NEUROLOGY

## 2019-02-26 PROCEDURE — 25000132 ZZH RX MED GY IP 250 OP 250 PS 637: Performed by: CLINICAL NURSE SPECIALIST

## 2019-02-26 PROCEDURE — 99207 ZZC CDG-CODE CATEGORY CHANGED: CPT | Performed by: PSYCHIATRY & NEUROLOGY

## 2019-02-26 PROCEDURE — 12800008 ZZH R&B CD ADULT

## 2019-02-26 PROCEDURE — 99239 HOSP IP/OBS DSCHRG MGMT >30: CPT | Performed by: PSYCHIATRY & NEUROLOGY

## 2019-02-26 RX ADMIN — THIAMINE HCL TAB 100 MG 100 MG: 100 TAB at 21:36

## 2019-02-26 RX ADMIN — LEVETIRACETAM 1000 MG: 500 TABLET, FILM COATED ORAL at 21:37

## 2019-02-26 RX ADMIN — MULTIPLE VITAMINS W/ MINERALS TAB 1 TABLET: TAB at 07:55

## 2019-02-26 RX ADMIN — THIAMINE HCL TAB 100 MG 100 MG: 100 TAB at 07:55

## 2019-02-26 RX ADMIN — FOLIC ACID 1 MG: 1 TABLET ORAL at 07:55

## 2019-02-26 RX ADMIN — IBUPROFEN 600 MG: 600 TABLET ORAL at 16:18

## 2019-02-26 RX ADMIN — IBUPROFEN 600 MG: 600 TABLET ORAL at 06:18

## 2019-02-26 RX ADMIN — LEVETIRACETAM 1000 MG: 500 TABLET, FILM COATED ORAL at 07:55

## 2019-02-26 ASSESSMENT — ACTIVITIES OF DAILY LIVING (ADL)
HYGIENE/GROOMING: INDEPENDENT
ORAL_HYGIENE: INDEPENDENT
DRESS: STREET CLOTHES
LAUNDRY: WITH SUPERVISION

## 2019-02-26 NOTE — PROGRESS NOTES
"[facility], [place]   Psychiatric Progress Note    Interim history   This is a 48 year old male with alcohol use dx severe.Pt seen in rounds. Patient's mood is good    Energy Level:MODERATE  Sleep:No concerns, sleeps well through night  Appetite:normal motivation interest improving   denied any Suicidal/homicidal ideation/plan intent.  Denied any psychosis  No prior suicde attempts  No access to gun     Tolerating meds and has no side effects.              Medications:     Current Facility-Administered Medications   Medication     acetaminophen (TYLENOL) tablet 325 mg     alum & mag hydroxide-simethicone (MYLANTA ES/MAALOX  ES) suspension 30 mL     atenolol (TENORMIN) tablet 50 mg     bisacodyl (DULCOLAX) Suppository 10 mg     cloNIDine (CATAPRES) tablet 0.2 mg     diazepam (VALIUM) tablet 5-20 mg     folic acid (FOLVITE) tablet 1 mg     hydrOXYzine (ATARAX) tablet 25 mg     ibuprofen (ADVIL/MOTRIN) tablet 600 mg     levETIRAcetam (KEPPRA) tablet 1,000 mg     magnesium hydroxide (MILK OF MAGNESIA) suspension 30 mL     multivitamin w/minerals (THERA-VIT-M) tablet 1 tablet     OLANZapine zydis (zyPREXA) ODT tab 5 mg     ondansetron (ZOFRAN-ODT) ODT tab 4-8 mg     traZODone (DESYREL) tablet 50 mg     vitamin B-12 (CYANOCOBALAMIN) injection 1,000 mcg     vitamin B1 (THIAMINE) tablet 100 mg             Allergies:     Allergies   Allergen Reactions     Penicillins Hives and Rash            Psychiatric Examination:   Blood pressure 134/86, pulse 64, temperature 98.7  F (37.1  C), temperature source Oral, resp. rate 16, height 1.753 m (5' 9\"), weight 101.2 kg (223 lb), SpO2 99 %.  Weight is 223 lbs 0 oz  Body mass index is 32.93 kg/m .    Appearance:  awake, alert and adequately groomed  Attitude:  cooperative  Eye Contact:  good  Mood:  better  Affect:  appropriate and in normal range and mood congruent  Speech:  clear, coherent rate /rhythm are good  Psychomotor Behavior:  no evidence of " "tardive dyskinesia, dystonia, or tics and intact station, gait and muscle tone  Throught Process:  logical  Associations:  no loose associations  Thought Content:  no evidence of suicidal ideation or homicidal ideation, no evidence of psychotic thought, no auditory hallucinations present and no visual hallucinations present  Insight:  good  Judgement:  intact  Oriented to:  time, person, and place  Attention Span and Concentration:  intact  Recent and Remote Memory:  intact  Language fund of knowledge are adequate         Labs:     No results found for: NTBNPI, NTBNP  Lab Results   Component Value Date    WBC 5.5 02/22/2019    HGB 13.1 (L) 02/22/2019    HCT 39.6 (L) 02/22/2019    MCV 94 02/22/2019    PLT 96 (L) 02/22/2019     Lab Results   Component Value Date    TSH 0.97 02/18/2019          DIAGNOSIS:   Axis I:  Alcohol use disorder, severe.      PLAN:    Pt is out of alcohol detox     Seen by nutrition consult  INTERVENTIONS  Implementation  -Nutrition Education: Unable d/t pt asleep.   -Medical food supplement therapy: As above.     Goals  Patient to consume % of nutritionally adequate meal trays TID, or the equivalent with supplements/snacks.     Monitoring/Evaluation  Progress toward goals will be monitored and evaluated per protocol    Seen by orthotist  02/20/19, 12:30, [facility] UR3AFH F314/F314-01, male, Height: 5' 9\", Weight: 223 lbs 0 oz, Ordered by: Vernell Ford, Acute Left Lateral Malleolus avulsion fx, [id number].     Subjective:    Patient was seen today at 14/F314-01 for the evaluation/fit/delivery of a CAM Boot (Maxtrax Air Ankle Walker medium Ref #[id number]) on the left foot. Patient appears pleasant.   Health History & Progression:       Past Surgical History   History reviewed. No pertinent surgical history.   .  Patient required a CAM boot to immobilize the left ankle. The CT scan of the left ankle revealed a small avulsion fx of the lateral malleolus with associated swelling and " potential chronic trauma.    Patient's history of utilizing a CAM Boot: no history of utilizing orthoses for ailment being addressed today.    Patient is currently limited by: left ankle pain.     Objective:    Patient is able to stand on his own volition.    Contralateral LE presents as WNL currently.    Sensory: Patient vocalized sensation as Normal in both upper and lower limbs.    Appearance of limb & vasculature: Swollen left ankle.     Assessment:    Patient can benefit from the CAM Boot because it will provide support of the foot, ankle, and tibia through pneumatic bladder total contact and control of ankle movement/tibial progression during gait. Patient s ailment recovery is expected to progress well with the utilization of the CAM Boot.     Upon fitting the brace the patient vocalized satisfaction with the fit and feel of the orthosis. The trimlines and circumferences of the brace presented satisfactory around patient s foot, ankle and tibia to properly immobilize the ankle and foot.    Verbal and written instructions were given to the patient on how to don/doff/care for the brace. Patient signed the delivery ticket and was given the [employer] receipt information sheet.     Goal:     A pneumatic walking boot is functionally and medically necessary for reduction of pain in foot. A pneumatic walker is used for variable volume total contact to reduce motion at the ankle, hind foot, mid foot and forefoot.     P:    Patient was fit and delivered for the CAM Boot. Patient will utilize the orthosis for the duration of rehabilitation/PT in the hospital and at home activities upon discharge for the duration of treatment of patient ailment or until use of orthosis is no longer necessary. Will follow-up PRN.                 Laboratory/Imaging: reviewed with patient   Consults: internal medicine consult reviewed  Patient will be treated in therapeutic milieu with appropriate individual and group therapies as  described.  PDMP CHECKED     Medical diagnoses to be addressed this admission:    Plan:  Brief Internal Medicine Note, 2/20/19:     IM following for complaints of L ankle pain.      Discussed CT ankle on 2/19 w/ radiology, as final read is not yet complete.  On preliminary review, pt appears to have small avulsion fracture of the lateral malleolus.  Will await final read and discuss further with Ortho.       Addendum:   Final read of CT left ankle with small avulsion fracture involving the lateral malleolus and soft tissue swelling the lateral greater than medial aspect of the ankle.  Also noted additional area of bony proliferation favored to represent early osteophytic spurring or sequela of chronic trauma.  Discussed with Ortho resident, recommend Orthotics consult for CAM boot and follow up in walk-in Orthopedic clinic in 6 weeks for repeat XR of ankle.       For now, continue to encourage pt to keep L foot elevated when at rest and ice packs for 20min at a time as needed throughout the day.  Continue NWB to LLE until CAM boot in place.  Can alternate Tylenol and Ibuprofen as ordered for pain PRN.                Legal Status: voluntary    Safety Assessment:   Checks:  15 min  Precautions: withdrawal precautions  Pt has not required locked seclusion or restraints in the past 24 hours to maintain safety, please refer to RN documentation for further details.  Discussed with patient many issues of addiction,triggers, relapse, and establishing a solid recovery program.  Able to give informed consent:  YES   Discussed Risks/Benefits/Side Effects/Alternatives: YES    After discussion of the indications, risks, benefits, alternatives and consequences of no treatment, the patient elects to complete detox and do trt.

## 2019-02-26 NOTE — PROGRESS NOTES
CLINICAL NUTRITION SERVICES - REASSESSMENT NOTE     Nutrition Prescription    RECOMMENDATIONS FOR MDs/PROVIDERS TO ORDER:  None at this time.    Malnutrition Status:    Patient does not meet two of the above criteria necessary for diagnosing malnutrition    Recommendations already ordered by Registered Dietitian (RD):  Adjusted Boost Plus order - To be only sent once daily    Future/Additional Recommendations:  RD to sign off. Please consult if further nutrition needs arise.      EVALUATION OF THE PROGRESS TOWARD GOALS   Diet: Regular: Boost Plus TID with meals    Intake: Pt appetite and intake noted by staff to be normal. Pt reports consuming 100% of meals TID. Pt was consuming a bag of cookies at time of visit.       NEW FINDINGS   Weight: no updated weight taken since admit. Pt expressed no concerns about wt at this time.   Meds: Continues Folic acid, thiamine, Thera-vit-m, and B12.     MALNUTRITION  % Intake: No decreased intake noted  % Weight Loss: None noted  Subcutaneous Fat Loss: None observed  Muscle Loss: None observed  Fluid Accumulation/Edema: None noted  Malnutrition Diagnosis: Patient does not meet two of the above criteria necessary for diagnosing malnutrition    Previous Goals   Patient to consume % of nutritionally adequate meal trays TID, or the equivalent with supplements/snacks.  Evaluation: Met    Previous Nutrition Diagnosis  Inadequate oral intake related to ETOH use and withdrawal symptoms as evidenced by pt likely not eating well PTA with ETOH use, hypophosphatemia (1.3 on 2/19),  and not yet eating full meals.  Evaluation: Resolved    CURRENT NUTRITION DIAGNOSIS  No nutrition diagnosis at this time.     INTERVENTIONS  Implementation  Nutrition education for recommended modifications - Encouraged pt to continue consuming balanced meals TID plus snacks as desired to help replete nutritional stores while structured meals provided. Discussed availability of RD should further  questions/concerns arise.     Goals  Patient to consume % of nutritionally adequate meal trays TID, or the equivalent with supplements/snacks.    Monitoring/Evaluation  No nutrition follow-up warranted at this time. RD to sign-off. Please consult if further needs arise.    Laisha Hollingsworth RD, LD  Pager: [phone]

## 2019-02-26 NOTE — PROGRESS NOTES
Placed calls to both [facility] and to [facility], pt reports that neither program called yesterday to do phone interview. Left voicemail messages with both programs, requested return call for update and to schedule phone interview.      Update: Pt had phone interview with both [facility] and [facility]. Pt reports he is hopeful to go to [facility], states [facility] is not the right fit for his needs. Writer placed call to [facility], left message with Sung to discuss intake and availability. Waiting for return call.  CM continues    Update of Update: Pt will be admitted to [facility] on Tuesday March 5th at 1:00 pm. Pt reports he can stay with his mother until then. Pt ready for discharge, treatment team notified. AVS complete.

## 2019-02-27 VITALS
BODY MASS INDEX: 33.03 KG/M2 | SYSTOLIC BLOOD PRESSURE: 147 MMHG | HEIGHT: 69 IN | TEMPERATURE: 98.1 F | DIASTOLIC BLOOD PRESSURE: 97 MMHG | RESPIRATION RATE: 16 BRPM | WEIGHT: 223 LBS | HEART RATE: 63 BPM | OXYGEN SATURATION: 99 %

## 2019-02-27 PROCEDURE — 25000132 ZZH RX MED GY IP 250 OP 250 PS 637: Performed by: PSYCHIATRY & NEUROLOGY

## 2019-02-27 PROCEDURE — 25000132 ZZH RX MED GY IP 250 OP 250 PS 637: Performed by: CLINICAL NURSE SPECIALIST

## 2019-02-27 RX ADMIN — FOLIC ACID 1 MG: 1 TABLET ORAL at 08:15

## 2019-02-27 RX ADMIN — IBUPROFEN 600 MG: 600 TABLET ORAL at 04:37

## 2019-02-27 RX ADMIN — LEVETIRACETAM 1000 MG: 500 TABLET, FILM COATED ORAL at 08:15

## 2019-02-27 RX ADMIN — MULTIPLE VITAMINS W/ MINERALS TAB 1 TABLET: TAB at 08:15

## 2019-02-27 RX ADMIN — THIAMINE HCL TAB 100 MG 100 MG: 100 TAB at 08:15

## 2019-02-27 NOTE — PROGRESS NOTES
Pt given copy of their discharge instructions and medication administration instructions. All discharge plans and labs were discussed with patient. Pt reports no questions at this time regarding discharge plans, labs or medications. Pt denies any suicidal ideation, plans or intent at this time. Patient discharge assisted via [facility] directly to the lobby. Patient plan is to go to his Mother's house in [place] and then on to treatment at [facility]. He was asked to make a follow up appointment with his primary care provider Dr. Cisse but declined stating he will have to check his Mother's schedule to see what date fits for them to make the appointment. Patient is discharged at this time.

## 2019-02-27 NOTE — DISCHARGE SUMMARY
Admit Date:     02/18/2019   Discharge Date:           More than 35 minutes spent on discharge summary.       DISCHARGE DIAGNOSIS   AXIS I:  Alcohol use disorder, severe.      Please review the detailed admission note by Dr. Myke castro on 02/19/2018.      During hospitalization, the patient had an prolonged detoxification.  His energy, motivation, sleep, interest improved.  During his hospitalization was seen by Internal Medicine consult.  H  On 02/19/2019, patient had thrombocytopenia, contact dermatitis.  He has hypophosphatemia.  All of this include his energy, motivation, sleep.  He did not have any suicidal .  He was continued on Keppra 1000 mg twice a day.  He met with the  and his plan is to do treatment.  He did not endorse any depression after the detox was done so antidepressives were not started.  He has energy, motivation, sleep, interest improved.  He went to the .  His plan is to go home to [facility] on 03/05 and he will stay with his mother.      DISCHARGE MENTAL STATUS EXAMINATION:  The patient is alert, oriented x 3.  Recent and remote memory, and language is all adequate.  No loose associations.  The patient does not have any suicidal ideations or intent.   I would like the patient to go to treatment directly, but he wants to go home and wait until there is a bed is avaialble he has a safe place to go.  He is very relapsed prone.  His prognosis is guarded if he relapses.          Anirudh Motley   Home Medication Instructions SEVERIANO:[id number]    Printed on:02/28/19 2063   Medication Information                      fluticasone (FLONASE) 50 MCG/ACT nasal spray  Spray 1 spray into both nostrils daily             folic acid (FOLVITE) 1 MG tablet  Take 1 tablet (1 mg) by mouth daily             hydrOXYzine (ATARAX) 25 MG tablet  Take 1 tablet (25 mg) by mouth every 4 hours as needed for anxiety             levETIRAcetam (KEPPRA) 1000 MG tablet  Take 1 tablet (1,000 mg) by  mouth 2 times daily             multivitamin w/minerals (THERA-VIT-M) tablet  Take 1 tablet by mouth daily             traZODone (DESYREL) 50 MG tablet  Take 1 tablet (50 mg) by mouth nightly as needed for sleep             triamcinolone (KENALOG) 0.1 % external cream  Apply topically 2 times daily             vitamin B-12 (CYANOCOBALAMIN) 1000 MCG/ML injection  Inject 1 mL (1,000 mcg) into the muscle every 30 days             vitamin B1 (THIAMINE) 100 MG tablet  Take 1 tablet (100 mg) by mouth 2 times daily                 Disposition: Home to his Mother's house with plan to admit to [facility] on 3/5/19     Treatment Follow-Up:  [facility]  Admission: Tuesday March 5th at 1:00 pm  [address] [place], [place] [id number]  [phone]     Follow up appointment:  You report will make an appointment with Dr. Cisse once you return to your Mother's house and have a chance to look over her schedule as well as your schedule to see what date will work. You agree to make the appointment for within 30 days and will likely make the appointment today.     Resources:   AA/NA and Sponsors are excellent resources for support and you can find one at any support group meeting.   http://www.aastpaul.org (then click meetings) This for the [facility] AA meetings  http://a[place].org/meetings/   This is for the [facility] AA meetings  http://www.nam[place].us (then click find a  Meeting) This is for [facility] NA   SMART Recovery - self management for addiction recovery:  www.smartrecovery.org  Pathways ~ A Health Crisis Resource & Support Center:  211.845.4744.  https://prescribetoprevent.org/patient-education/videos/  http://www.harmreduction.org  [facility] 433-965-7510  Support Group:  AA/NA and Sponsor/support.  National [place] on Mental Illness (www.[place].elba.org): 416.539.4440 or 876-961-9801.  Alcoholics Anonymous (www.alcoholics-anonymous.org): Check your phone book for your  local chapter.  Suicide Awareness Voices of Education (SAVE) (www.save.org): 033-493-GBUP (7283)  National Suicide Prevention Line (www.mentalhealthmn.org): 007-806-TIXZ (3912)  Mental Health Consumer/Survivor Network of [place] (www.mhcsn.net): 707.143.1034 or 405-755-7171  Mental Health Association of [place] (www.mentalhealth.org): 544.298.3540 or 806-968-3149   Substance Abuse and Mental Health Services (www.samhsa.gov)     [place] Recovery Connection ([facility])  [facility] connects people seeking recovery to resources that help foster and sustain long-term recovery.  Whether you are seeking resources for treatment, transportation, housing, job training, education, health care or other pathways to recovery, [facility] is a great place to start.  279.264.3848.  www.[facility].org     JOSE ARMANDO HANSEN MD             D: 2019   T: 2019   MT: MITCHELL      Name:     LATA WEBB   MRN:      [id number]        Account:        [id number]   :      1970           Admit Date:     2019                                  Discharge Date:       Document: [id number]

## 2019-02-27 NOTE — DISCHARGE INSTRUCTIONS
Behavioral Discharge Planning and Instructions  THANK YOU FOR CHOOSING THE [address]  [phone]    Summary: You were admitted to [facility] on 2/18/2019 for detoxification from alcohol.  A medical exam was performed that included lab work. You have met with a  and opted to attend treatment at [facility] starting on March 5th.  Please take care and make your recovery a daily priority, Anirudh! It was a pleasure working with you and the entire treatment team here wishes you the very best in your recovery!     Recommendation:  Treatment and Housing with [facility]    Main Diagnosis:  Per Dr. Stringer;  Alcohol use disorder, severe.     Major Treatments, Procedures and Findings:  You were treated for alcohol detoxification using valium administered based on the [facility] protocol. You completed a chemical dependency assessment. You had labs drawn and those results were reviewed with you. Please take a copy of your lab work with you to your next primary care physician appointment.    Symptoms to Report:  If you experience more anxiety, confusion, sleeplessness, deep sadness or thoughts of suicide, notify your treatment team or notify your primary care physician. IF ANY OF THE SYMPTOMS YOU ARE EXPERIENCING ARE A MEDICAL EMERGENCY CALL 911 IMMEDIATELY.     Lifestyle Adjustment: Adjust your lifestyle to get enough sleep, relaxation, exercise and good nutrition. Continue to develop healthy coping skills to decrease stress and promote a sober living environment. Do not use mood altering substances including alcohol, illegal drugs or addictive medications other than what is currently prescribed.     Disposition: Home to his Mother's house with plan to admit to [facility] on 3/5/19    Treatment Follow-Up:  [facility]  Admission: Tuesday March 5th at 1:00 pm  [address] [place] [id number]  [phone]    Follow up appointment:  You report will make an  appointment with Dr. Cisse once you return to your Mother's house and have a chance to look over her schedule as well as your schedule to see what date will work. You agree to make the appointment for within 30 days and will likely make the appointment today.    Resources:   AA/NA and Sponsors are excellent resources for support and you can find one at any support group meeting.   http://www.aastpaul.org (then click meetings) This for the [facility] AA meetings  http://aamin[place].org/meetings/   This is for the [facility] AA meetings  http://www.nam[place].us (then click find a  Meeting) This is for [facility] NA   [employer]cera Recovery - self management for addiction recovery:  www.smartrec[place].org  Pathways ~ A Health Crisis Resource & Support Center:  350.210.2346.  https://prescribetoprevent.org/patient-education/videos/  http://www.harmreduction.org  [facility] 608-475-6885  Support Group:  AA/NA and Sponsor/support.  National [place] on Mental Illness (www.mn.elba.org): 516.482.7948 or 201-950-1019.  Alcoholics Anonymous (www.alcoholics-anonymous.org): Check your phone book for your local chapter.  Suicide Awareness Voices of Education (SAVE) (www.save.org): 169-740-JPUB (7283)  National Suicide Prevention Line (www.mentalhealthmn.org): 033-651-QLFM (8079)  Mental Health Consumer/Survivor Network of [place] (www.mhcsn.net): 917.445.1827 or 980-863-3572  Mental Health Association of [place] (www.mentalhealth.org): 516.979.6722 or 354-020-7595   Substance Abuse and Mental Health Services (www.samhsa.gov)    [place] Recovery Connection ([facility])  [facility] connects people seeking recovery to resources that help foster and sustain long-term recovery.  Whether you are seeking resources for treatment, transportation, housing, job training, education, health care or other pathways to recovery, [facility] is a great place to start.  406.437.3403.  www.[facility].org    General Medication Instructions:   See  your medication sheet(s) for instructions.   Take all medications as prescribed.  Make no changes unless your doctor suggests them.   Go to all your doctor visits.  Be sure to have all your required lab tests. This way, your medicines can be refilled on time.  Do not use any forms of alcohol.    Please Note:  If you have any questions at anytime after you are discharged please call the [facility] detox unit 3AW unit at [phone].  [facility] Behavioral Intake [phone]  Medical Records call [phone]  Outpatient Behavioral Intake call [phone]  LP+ Wait List/Bed Availability call [phone]    Please take this discharge folder with you to all your follow up appointments, it contains your lab results, diagnosis, medication list and discharge recommendations.      THANK YOU FOR CHOOSING THE [facility]

## 2019-03-20 ENCOUNTER — HOSPITAL ENCOUNTER (EMERGENCY)
Facility: CLINIC | Age: 49
Discharge: HOME | End: 2019-03-20
Attending: EMERGENCY MEDICINE | Admitting: EMERGENCY MEDICINE
Payer: COMMERCIAL

## 2019-03-20 VITALS
SYSTOLIC BLOOD PRESSURE: 118 MMHG | OXYGEN SATURATION: 95 % | RESPIRATION RATE: 18 BRPM | TEMPERATURE: 96 F | HEART RATE: 94 BPM | DIASTOLIC BLOOD PRESSURE: 82 MMHG

## 2019-03-20 DIAGNOSIS — R41.82 ALTERED MENTAL STATUS, UNSPECIFIED ALTERED MENTAL STATUS TYPE: ICD-10-CM

## 2019-03-20 DIAGNOSIS — F10.229 ACUTE ALCOHOLIC INTOXICATION IN ALCOHOLISM WITH COMPLICATION (H): ICD-10-CM

## 2019-03-20 LAB — ALCOHOL BREATH TEST: 0.28 (ref 0–0.01)

## 2019-03-20 PROCEDURE — 99283 EMERGENCY DEPT VISIT LOW MDM: CPT | Mod: Z6 | Performed by: EMERGENCY MEDICINE

## 2019-03-20 PROCEDURE — 82075 ASSAY OF BREATH ETHANOL: CPT | Performed by: EMERGENCY MEDICINE

## 2019-03-20 PROCEDURE — 99285 EMERGENCY DEPT VISIT HI MDM: CPT | Performed by: EMERGENCY MEDICINE

## 2019-03-20 NOTE — ED PROVIDER NOTES
History     Chief Complaint   Patient presents with     Alcohol Intoxication     HPI  [facility] AURELIO Motley is a 48 year old male who who presents with intoxication.  Apparently he was found sleeping outside the house of charities after they refused to allow him in due to his level of intoxication.  He is currently somnolent and not cooperating with me, not willing to answer any questions.  He smells of alcohol.    Past Medical History:   Diagnosis Date     Alcohol dependence (H)     lives in wet house.  Per family has had seizures associated with EtOH in past     Anxiety      Depression        No past surgical history on file.    No family history on file.    Social History     Tobacco Use     Smoking status: Never Smoker     Smokeless tobacco: Never Used   Substance Use Topics     Alcohol use: Yes         I have reviewed the Medications, Allergies, Past Medical and Surgical History, and Social History in the Epic system.    Review of Systems   Unable to perform ROS: Mental status change       Physical Exam   BP: (refused)  Pulse: 67  Temp: (refused)  Resp: 16  SpO2: 98 %      Physical Exam   Constitutional: No distress.   HENT:   Head: Atraumatic.   Eyes: Pupils are equal, round, and reactive to light. No scleral icterus.   Cardiovascular: Normal heart sounds and intact distal pulses.   Pulmonary/Chest: Breath sounds normal. No respiratory distress.   Abdominal: Soft. There is no tenderness.   Musculoskeletal: He exhibits no edema or tenderness.   Skin: Skin is warm. No rash noted. He is not diaphoretic.       ED Course        Procedures             Critical Care time:  none             Labs Ordered and Resulted from Time of ED Arrival Up to the Time of Departure from the ED   ALCOHOL BREATH TEST POCT - Abnormal; Notable for the following components:       Result Value    Alcohol Breath Test 0.276 (*)     All other components within normal limits   DRUG ABUSE SCREEN 6 CHEM DEP URINE ([facility])            Assessments & Plan  (with Medical Decision Making)   I did reassess the patient later.  He is now arousable, though still quite intoxicated.  He denies acute complaints.  He will be signed out to the oncoming doctor at shift change pending sobriety or transfer to detox.    Dictation Disclaimer: Some of this Note has been completed with voice-recognition dictation software. Although errors are generally corrected real-time, there is the potential for a rare error to be present in the completed chart.      I have reviewed the nursing notes.    I have reviewed the findings, diagnosis, plan and need for follow up with the patient.       Medication List      ASK your doctor about these medications    hydrOXYzine 25 MG tablet  Commonly known as:  ATARAX  25 mg, Oral, EVERY 4 HOURS PRN  Ask about: Should I take this medication?            Final diagnoses:   Acute alcoholic intoxication in alcoholism with complication (H)   Altered mental status, unspecified altered mental status type       3/20/2019   [facility], [place], EMERGENCY DEPARTMENT     Chelsey Lunsford MD  03/20/19 0714

## 2019-03-20 NOTE — ED NOTES
"Sign out Provider: Jonn      Sign out Plan: Patient seen on the night shift due to altered mental status which appeared due to alcohol intoxication.  He was allowed to sober in the emergency department and I was asked to reassess him once clinically sober this morning.      Reassessment: Patient is awake, alert, oriented x3, in no distress and appears clinically sober.  He states that he admittedly, consumed far too much alcohol last night.  He is an alcoholic, but generally does not drink as much as he did yesterday.  He was offered detox but declined.  He states he does not have a history of medically significant withdrawal and does not intend to discontinue drinking completely in the near future.  He does state he is aware he overdid it last night and will not drink to that extent.  He states he is well aware of the potential medical consequences of alcohol abuse.  He denies any physical or psychiatric symptoms and states \"I have some appointments to get to today\".      Disposition: I see no indication to hold him any longer in the ED.  He is clinically sober and denies all complaints.  He meets no grounds for other medical workup for involuntary psychiatric hold.  He will be provided with resources to seek the availability of detox should he so desire.       Mauricio Bruno MD  03/20/19 1015    "

## 2019-03-20 NOTE — ED NOTES
Bed: HW02  Expected date:   Expected time:   Means of arrival:   Comments:  Ot[address]  48 M ETOH

## 2019-03-20 NOTE — ED NOTES
Patient found sleeping outside of [place] [facility] [place] [facility] [employer] after they refused him entry due to his intoxication. Here he was unable to blow hard enough to trigger breathalyzer, tried twice then refused to try again. Allowed some VS not all. Currently sleeping in hallway.

## 2019-03-20 NOTE — ED AVS SNAPSHOT
[facility], [place], Emergency Department  [address]  [facility] [place] [address]  Phone:  [phone]  Fax:  [phone]                                    Anirudh Motley   MRN: [id number]    Department:  [facility], Emergency Department   Date of Visit:  3/20/2019           After Visit Summary Signature Page    I have received my discharge instructions, and my questions have been answered. I have discussed any challenges I see with this plan with the nurse or doctor.    ..........................................................................................................................................  Patient/Patient Representative Signature      ..........................................................................................................................................  Patient Representative Print Name and Relationship to Patient    ..................................................               ................................................  Date                                   Time    ..........................................................................................................................................  Reviewed by Signature/Title    ...................................................              ..............................................  Date                                               Time          22EPIC Rev 08/18

## 2019-03-21 ENCOUNTER — HOSPITAL ENCOUNTER (EMERGENCY)
Facility: CLINIC | Age: 49
Discharge: HOME | End: 2019-03-22
Attending: EMERGENCY MEDICINE | Admitting: EMERGENCY MEDICINE
Payer: COMMERCIAL

## 2019-03-21 DIAGNOSIS — F10.920 ALCOHOLIC INTOXICATION WITHOUT COMPLICATION (H): ICD-10-CM

## 2019-03-21 PROCEDURE — 99283 EMERGENCY DEPT VISIT LOW MDM: CPT | Performed by: EMERGENCY MEDICINE

## 2019-03-21 PROCEDURE — 82075 ASSAY OF BREATH ETHANOL: CPT | Performed by: EMERGENCY MEDICINE

## 2019-03-21 PROCEDURE — 99284 EMERGENCY DEPT VISIT MOD MDM: CPT | Mod: Z6 | Performed by: EMERGENCY MEDICINE

## 2019-03-21 NOTE — ED AVS SNAPSHOT
[facility], [place], Emergency Department  [address]  [facility] [place] [address]  Phone:  [phone]  Fax:  [phone]                                    Anirudh Motley   MRN: [id number]    Department:  [facility], Emergency Department   Date of Visit:  3/21/2019           After Visit Summary Signature Page    I have received my discharge instructions, and my questions have been answered. I have discussed any challenges I see with this plan with the nurse or doctor.    ..........................................................................................................................................  Patient/Patient Representative Signature      ..........................................................................................................................................  Patient Representative Print Name and Relationship to Patient    ..................................................               ................................................  Date                                   Time    ..........................................................................................................................................  Reviewed by Signature/Title    ...................................................              ..............................................  Date                                               Time          22EPIC Rev 08/18

## 2019-03-22 VITALS
OXYGEN SATURATION: 97 % | HEART RATE: 91 BPM | RESPIRATION RATE: 16 BRPM | SYSTOLIC BLOOD PRESSURE: 125 MMHG | TEMPERATURE: 98.4 F | DIASTOLIC BLOOD PRESSURE: 84 MMHG | WEIGHT: 223 LBS | BODY MASS INDEX: 32.93 KG/M2

## 2019-03-22 LAB — ALCOHOL BREATH TEST: 0.36 (ref 0–0.01)

## 2019-03-22 NOTE — ED NOTES
Bed: ED11  Expected date: 3/21/19  Expected time: 11:18 PM  Means of arrival: Ambulance  Comments:  Parag 414  48 m  ETOH

## 2019-03-22 NOTE — ED PROVIDER NOTES
History       Chief Complaint   Patient presents with     Alcohol Intoxication     Pt found at [employer] [facility] intoxicated and unable to get back to his apartment.       HPI  Anirudh Motley is a 48 year old male who was brought in for altered mental status.  He was found intoxicated on a couch at [facility] unable to go back to his apartment.  Unable to care for self.    No evidence of trauma on the scene.  Patient denies other drugs.  Cooperative in the ED.      I have reviewed the Medications, Allergies, Past Medical and Surgical History, and Social History in the Epic system.    Review of Systems  .unable / unreliable     Physical Exam       /59   Pulse 75   Temp 96.5  F (35.8  C) (Oral)   Resp 16   SpO2 94%     GEN: Somnolent, intoxicated, arousable to verbal stimulus.   HEENT: The head is normocephalic and atraumatic. Pupils are equal round and reactive to light. Extraocular motions are intact. There is no facial swelling.   CV: Regular rate   PULM: Unlabored breathing   EXT: Full range of motion.  No edema.  NEURO: Cranial nerves II through XII are intact and symmetric. Bilateral upper and lower extremities grossly show full range of motion without any focal deficits.       ED Course     ED Course              Results for orders placed or performed during the hospital encounter of 03/21/19   Alcohol breath test POCT   Result Value Ref Range    Alcohol Breath Test 0.364 (A) 0.00 - 0.01                Assessments & Plan (with Medical Decision Making)   Anriudh Motley is a 48 year old p/w AMS    Clinically, the patient is intoxicated and unable to provide cohesive history  Exam is notable for no significant evidence of trauma  and decreased level of consciousness, presumably d/t intoxication, although a broad differential is considered.     DDX: other substance ingestion, meningitis, encephalitis, ICH, seizure and post ictal state, infection, uremia, among others. Given known etoh ingestion,  history, benign examination, I believe these are less likely.    Patient will be watched carefully with frequent neuro checks in the ED.  Anticipate pt will sober and improve. A change form a continuous improvement pattern will prompt re-evaluation and further workup.    On reevaluation at 6:20 AM, the patient is greatly improved.  Able to ambulate but not quite completely steady.  Still somewhat somnolent.  Will require further observation.    Patient signed out to oncoming morning attending for reevaluation.           I have reviewed the nursing notes.    I have reviewed the findings, diagnosis, plan and need for follow up with the patient.  Impression: Acute alcohol intoxication, altered mental status.      March 22, 2019    [facility], [place], EMERGENCY DEPARTMENT       Kevin Akins MD  03/22/19 0619

## 2019-03-22 NOTE — ED NOTES
Emergency Department Patient Sign-out       Brief HPI:  This is a 48 year old male signed out to me by  See initial ED Provider note for details of the presentation.     Exam:   Patient Vitals for the past 24 hrs:   BP Temp Temp src Pulse Resp SpO2   03/21/19 2346 103/59 96.5  F (35.8  C) Oral 75 16 94 %           ED RESULTS:   Results for orders placed or performed during the hospital encounter of 03/21/19 (from the past 24 hour(s))   Alcohol breath test POCT     Status: Abnormal    Collection Time: 03/22/19 12:03 AM   Result Value Ref Range    Alcohol Breath Test 0.364 (A) 0.00 - 0.01       ED MEDICATIONS:   Medications - No data to display    Medical decision making and reevaluation: Patient is 48-year-old male signed out to me by overnight provider.  He was brought in intoxicated with alcohol.  This morning on reevaluation the patient clinically sober.  He was given breakfast and allowed to wake up.  He is not currently suicidal or homicidal and comfortable discharge home.    Impression:  Alcohol intoxication    Disposition: Discharge    Rodrigo Perez,   03/22/19 0855

## 2019-03-22 NOTE — ED PROVIDER NOTES
"  History     Chief Complaint   Patient presents with     Alcohol Intoxication     [facility] and unable to get back to his apartment.     HPI  [facility] AURELIO Motley is a 48 year old male who ***    Past Medical History:   Diagnosis Date     Alcohol dependence (H)     lives in wet house.  Per family has had seizures associated with EtOH in past     Anxiety      Depression      Seizures (H)      Substance abuse (H)        History reviewed. No pertinent surgical history.    History reviewed. No pertinent family history.    Social History     Tobacco Use     Smoking status: Never Smoker     Smokeless tobacco: Never Used   Substance Use Topics     Alcohol use: Yes     I have reviewed the Medications, Allergies, Past Medical and Surgical History, and Social History in the Epic system.    Review of Systems   14 point review of symptoms was performed and is negative except as noted above.     Physical Exam   BP: 103/59  Pulse: 75  Temp: 96.5  F (35.8  C)  Resp: 16  SpO2: 94 %      Physical Exam  GEN: Well appearing, non toxic, cooperative and conversant.   HEENT: The head is normocephalic and atraumatic. Pupils are equal round and reactive to light. Extraocular motions are intact. There is no facial swelling. The neck is nontender and supple.   CV: Regular rate and rhythm without murmurs rubs or gallops. 2+ radial pulses bilaterally.  PULM: Clear to auscultation bilaterally.  ABD: Soft, nontender, nondistended.   EXT: Full range of motion.  No edema.  NEURO: Cranial nerves II through XII are intact and symmetric. Bilateral upper and lower extremities grossly show full range of motion without any focal deficits.   SKIN: No rashes, ecchymosis, or lacerations  PSYCH: Calm and cooperative, interactive.     ED Course        Procedures        {EKG done?:856801::\" \"}    Critical Care time:  {none or minutes:614866::\"none\"}  {trauma activation or Fall?:318472::\" \"}  {Sepsis/Septic Shock/Stemi/Stroke:417489::\" " "\"}       Labs Ordered and Resulted from Time of ED Arrival Up to the Time of Departure from the ED   ALCOHOL BREATH TEST POCT - Abnormal; Notable for the following components:       Result Value    Alcohol Breath Test 0.364 (*)     All other components within normal limits            Assessments & Plan (with Medical Decision Making)   ***    I have reviewed the nursing notes.    I have reviewed the findings, diagnosis, plan and need for follow up with the patient.       Medication List      ASK your doctor about these medications    hydrOXYzine 25 MG tablet  Commonly known as:  ATARAX  25 mg, Oral, EVERY 4 HOURS PRN  Ask about: Should I take this medication?            Final diagnoses:   None   Timothy SANTACRUZ, am serving as a trained medical scribe to document services personally performed by Kevin Akins MD, based on the provider's statements to me.      Kevin SANTACRUZ MD, was physically present and have reviewed and verified the accuracy of this note documented by Timothy Montemayor.     3/21/2019   [facility], [place], EMERGENCY DEPARTMENT  "

## 2019-03-22 NOTE — DISCHARGE INSTRUCTIONS
Follow-up with your regular physician.  Return the emergency department for new or worsening symptoms

## 2019-03-27 NOTE — TELEPHONE ENCOUNTER
FUTURE VISIT INFORMATION      FUTURE VISIT INFORMATION:    Date: 4/1    Time: 10a    Location: [facility]  REFERRAL INFORMATION:    Referring provider:  Dr. Wong    Referring providers clinic:   ED    Reason for visit/diagnosis  Ankle Pain    RECORDS REQUESTED FROM:       Clinic name Comments Records Status Imaging Status    Emergency Dept    [address] [place] [address]    [id number]    [place] [address]    [phone]   2/7/19 ED Notes, XR Foot/Ankle Care Everywhere Requested

## 2019-04-01 ENCOUNTER — PRE VISIT (OUTPATIENT)
Dept: ORTHOPEDICS | Facility: CLINIC | Age: 49
End: 2019-04-01

## 2019-05-08 ENCOUNTER — HOSPITAL ENCOUNTER (EMERGENCY)
Facility: CLINIC | Age: 49
Discharge: HOME | End: 2019-05-08
Attending: EMERGENCY MEDICINE | Admitting: EMERGENCY MEDICINE
Payer: COMMERCIAL

## 2019-05-08 VITALS
BODY MASS INDEX: 32.93 KG/M2 | TEMPERATURE: 98.3 F | OXYGEN SATURATION: 100 % | RESPIRATION RATE: 18 BRPM | HEIGHT: 69 IN | DIASTOLIC BLOOD PRESSURE: 80 MMHG | SYSTOLIC BLOOD PRESSURE: 150 MMHG | HEART RATE: 90 BPM

## 2019-05-08 DIAGNOSIS — Z76.0 ENCOUNTER FOR MEDICATION REFILL: ICD-10-CM

## 2019-05-08 DIAGNOSIS — F10.10 ALCOHOL ABUSE: ICD-10-CM

## 2019-05-08 DIAGNOSIS — F10.220 ALCOHOL DEPENDENCE WITH UNCOMPLICATED INTOXICATION (H): ICD-10-CM

## 2019-05-08 LAB
ALBUMIN SERPL-MCNC: 3.9 G/DL (ref 3.4–5)
ALP SERPL-CCNC: 65 U/L (ref 40–150)
ALT SERPL W P-5'-P-CCNC: 65 U/L (ref 0–70)
AMPHETAMINES UR QL SCN: NEGATIVE
ANION GAP SERPL CALCULATED.3IONS-SCNC: 14 MMOL/L (ref 3–14)
AST SERPL W P-5'-P-CCNC: ABNORMAL U/L (ref 0–45)
BARBITURATES UR QL: NEGATIVE
BASOPHILS # BLD AUTO: 0.1 10E9/L (ref 0–0.2)
BASOPHILS NFR BLD AUTO: 0.8 %
BENZODIAZ UR QL: POSITIVE
BILIRUB SERPL-MCNC: 1 MG/DL (ref 0.2–1.3)
BUN SERPL-MCNC: 8 MG/DL (ref 7–30)
CALCIUM SERPL-MCNC: 8.4 MG/DL (ref 8.5–10.1)
CANNABINOIDS UR QL SCN: NEGATIVE
CHLORIDE SERPL-SCNC: 99 MMOL/L (ref 94–109)
CO2 SERPL-SCNC: 22 MMOL/L (ref 20–32)
COCAINE UR QL: NEGATIVE
CREAT SERPL-MCNC: 0.59 MG/DL (ref 0.66–1.25)
DIFFERENTIAL METHOD BLD: ABNORMAL
EOSINOPHIL # BLD AUTO: 0.2 10E9/L (ref 0–0.7)
EOSINOPHIL NFR BLD AUTO: 1.3 %
ERYTHROCYTE [DISTWIDTH] IN BLOOD BY AUTOMATED COUNT: 13 % (ref 10–15)
ETHANOL SERPL-MCNC: <0.01 G/DL
ETHANOL UR QL SCN: NEGATIVE
GFR SERPL CREATININE-BSD FRML MDRD: >90 ML/MIN/{1.73_M2}
GLUCOSE SERPL-MCNC: 88 MG/DL (ref 70–99)
HCT VFR BLD AUTO: 36.3 % (ref 40–53)
HGB BLD-MCNC: 12.6 G/DL (ref 13.3–17.7)
IMM GRANULOCYTES # BLD: 0.1 10E9/L (ref 0–0.4)
IMM GRANULOCYTES NFR BLD: 0.4 %
INR PPP: 1.06 (ref 0.86–1.14)
LIPASE SERPL-CCNC: 66 U/L (ref 73–393)
LYMPHOCYTES # BLD AUTO: 2 10E9/L (ref 0.8–5.3)
LYMPHOCYTES NFR BLD AUTO: 17.2 %
MCH RBC QN AUTO: 30.9 PG (ref 26.5–33)
MCHC RBC AUTO-ENTMCNC: 34.7 G/DL (ref 31.5–36.5)
MCV RBC AUTO: 89 FL (ref 78–100)
MONOCYTES # BLD AUTO: 0.7 10E9/L (ref 0–1.3)
MONOCYTES NFR BLD AUTO: 6.3 %
NEUTROPHILS # BLD AUTO: 8.8 10E9/L (ref 1.6–8.3)
NEUTROPHILS NFR BLD AUTO: 74 %
NRBC # BLD AUTO: 0 10*3/UL
NRBC BLD AUTO-RTO: 0 /100
OPIATES UR QL SCN: NEGATIVE
PLATELET # BLD AUTO: 146 10E9/L (ref 150–450)
POTASSIUM SERPL-SCNC: 4 MMOL/L (ref 3.4–5.3)
PROT SERPL-MCNC: 7.2 G/DL (ref 6.8–8.8)
RBC # BLD AUTO: 4.08 10E12/L (ref 4.4–5.9)
SODIUM SERPL-SCNC: 135 MMOL/L (ref 133–144)
WBC # BLD AUTO: 11.8 10E9/L (ref 4–11)

## 2019-05-08 PROCEDURE — 82040 ASSAY OF SERUM ALBUMIN: CPT

## 2019-05-08 PROCEDURE — 84460 ALANINE AMINO (ALT) (SGPT): CPT

## 2019-05-08 PROCEDURE — 85025 COMPLETE CBC W/AUTO DIFF WBC: CPT | Performed by: EMERGENCY MEDICINE

## 2019-05-08 PROCEDURE — 84075 ASSAY ALKALINE PHOSPHATASE: CPT

## 2019-05-08 PROCEDURE — 82435 ASSAY OF BLOOD CHLORIDE: CPT

## 2019-05-08 PROCEDURE — 84520 ASSAY OF UREA NITROGEN: CPT

## 2019-05-08 PROCEDURE — 84295 ASSAY OF SERUM SODIUM: CPT

## 2019-05-08 PROCEDURE — 80307 DRUG TEST PRSMV CHEM ANLYZR: CPT | Performed by: EMERGENCY MEDICINE

## 2019-05-08 PROCEDURE — 82374 ASSAY BLOOD CARBON DIOXIDE: CPT

## 2019-05-08 PROCEDURE — 82565 ASSAY OF CREATININE: CPT

## 2019-05-08 PROCEDURE — 80320 DRUG SCREEN QUANTALCOHOLS: CPT | Mod: 91 | Performed by: EMERGENCY MEDICINE

## 2019-05-08 PROCEDURE — 84132 ASSAY OF SERUM POTASSIUM: CPT

## 2019-05-08 PROCEDURE — 83690 ASSAY OF LIPASE: CPT | Performed by: EMERGENCY MEDICINE

## 2019-05-08 PROCEDURE — 25000125 ZZHC RX 250: Performed by: EMERGENCY MEDICINE

## 2019-05-08 PROCEDURE — 84155 ASSAY OF PROTEIN SERUM: CPT

## 2019-05-08 PROCEDURE — 96365 THER/PROPH/DIAG IV INF INIT: CPT | Performed by: EMERGENCY MEDICINE

## 2019-05-08 PROCEDURE — 85610 PROTHROMBIN TIME: CPT | Performed by: EMERGENCY MEDICINE

## 2019-05-08 PROCEDURE — 82947 ASSAY GLUCOSE BLOOD QUANT: CPT

## 2019-05-08 PROCEDURE — 25800030 ZZH RX IP 258 OP 636: Performed by: EMERGENCY MEDICINE

## 2019-05-08 PROCEDURE — 99284 EMERGENCY DEPT VISIT MOD MDM: CPT | Mod: 25 | Performed by: EMERGENCY MEDICINE

## 2019-05-08 PROCEDURE — 80320 DRUG SCREEN QUANTALCOHOLS: CPT | Performed by: EMERGENCY MEDICINE

## 2019-05-08 PROCEDURE — 25000128 H RX IP 250 OP 636: Performed by: EMERGENCY MEDICINE

## 2019-05-08 PROCEDURE — 99284 EMERGENCY DEPT VISIT MOD MDM: CPT | Mod: Z6 | Performed by: EMERGENCY MEDICINE

## 2019-05-08 PROCEDURE — 82247 BILIRUBIN TOTAL: CPT

## 2019-05-08 PROCEDURE — 82310 ASSAY OF CALCIUM: CPT

## 2019-05-08 RX ORDER — SODIUM CHLORIDE, SODIUM LACTATE, POTASSIUM CHLORIDE, CALCIUM CHLORIDE 600; 310; 30; 20 MG/100ML; MG/100ML; MG/100ML; MG/100ML
1000 INJECTION, SOLUTION INTRAVENOUS CONTINUOUS
Status: DISCONTINUED | OUTPATIENT
Start: 2019-05-08 | End: 2019-05-08 | Stop reason: HOSPADM

## 2019-05-08 RX ORDER — LEVETIRACETAM 1000 MG/1
1000 TABLET ORAL 2 TIMES DAILY
Qty: 5 TABLET | Refills: 0 | Status: SHIPPED | OUTPATIENT
Start: 2019-05-08 | End: 2019-11-11

## 2019-05-08 RX ADMIN — THIAMINE HYDROCHLORIDE: 100 INJECTION, SOLUTION INTRAMUSCULAR; INTRAVENOUS at 14:24

## 2019-05-08 RX ADMIN — SODIUM CHLORIDE, POTASSIUM CHLORIDE, SODIUM LACTATE AND CALCIUM CHLORIDE 1000 ML: 600; 310; 30; 20 INJECTION, SOLUTION INTRAVENOUS at 14:24

## 2019-05-08 ASSESSMENT — ENCOUNTER SYMPTOMS
SHORTNESS OF BREATH: 0
COLOR CHANGE: 0
SEIZURES: 1
EYE REDNESS: 0
ABDOMINAL PAIN: 0
ARTHRALGIAS: 0
CONFUSION: 0
NECK STIFFNESS: 0
HEADACHES: 0
FEVER: 0
DIFFICULTY URINATING: 0

## 2019-05-08 NOTE — ED PROVIDER NOTES
History     Chief Complaint   Patient presents with     Withdrawal     HPI  [facility] AURELIO Motley is a 48 year old male with a history of alcoholism, alcohol withdrawal, withdrawal-induced seizures who presents to the ED. The patient states he had a seizure. He notes he's been on Keppra for 6 years, but has recently run out and has not been able to see his clinic for a refill due to transportation issues. The patient drinks 2L of vodka daily, and notes he is currently on a binge. Last dose of Keppra was 5/6/19 which was an ED visit, 2 days ago. He is hoping to be on Keppra and to continue drinking. He states he was hoping to get a prescription for Keppra. He denies any injuries. He states that before seizures, he sees flashing red lights and a warm feeling, and sometimes a sweet smell. He denies history of nonalcohol related seizures.     I have reviewed the Medications, Allergies, Past Medical and Surgical History, and Social History in the [employer] system.  Past Medical History:   Diagnosis Date     Alcohol dependence (H)     lives in [facility].  Per family has had seizures associated with EtOH in past     Anxiety      Depression      Seizures (H)      Substance abuse (H)        History reviewed. No pertinent surgical history.    No family history on file.    Social History     Tobacco Use     Smoking status: Never Smoker     Smokeless tobacco: Never Used   Substance Use Topics     Alcohol use: Yes       Current Facility-Administered Medications   Medication     lactated ringers infusion     Current Outpatient Medications   Medication     levETIRAcetam (KEPPRA) 1000 MG tablet     traZODone (DESYREL) 50 MG tablet        Allergies   Allergen Reactions     Penicillins Hives and Rash       Review of Systems   Constitutional: Negative for fever.   HENT: Negative for congestion.    Eyes: Negative for redness.   Respiratory: Negative for shortness of breath.    Cardiovascular: Negative for chest pain.   Gastrointestinal: Negative  "for abdominal pain.   Genitourinary: Negative for difficulty urinating.   Musculoskeletal: Negative for arthralgias and neck stiffness.   Skin: Negative for color change.   Neurological: Positive for seizures. Negative for headaches.   Psychiatric/Behavioral: Negative for confusion.       Physical Exam   BP: (!) 147/112  Pulse: 116  Heart Rate: 94  Temp: 98.3  F (36.8  C)  Resp: 16  Height: 175.3 cm (5' 9\")  SpO2: 98 %      Physical Exam   Constitutional: No distress.   HENT:   Head: Atraumatic.   Mouth/Throat: Oropharynx is clear and moist.   Eyes: Pupils are equal, round, and reactive to light. No scleral icterus.   Cardiovascular: Normal heart sounds and intact distal pulses.   Pulmonary/Chest: Breath sounds normal. No respiratory distress.   Abdominal: Soft. Bowel sounds are normal. There is no tenderness.   Musculoskeletal: He exhibits no edema or tenderness.   Skin: Skin is warm. No rash noted. He is not diaphoretic.       ED Course        Procedures             Labs Ordered and Resulted from Time of ED Arrival Up to the Time of Departure from the ED   COMPREHENSIVE METABOLIC PANEL - Abnormal; Notable for the following components:       Result Value    Creatinine 0.59 (*)     Calcium 8.4 (*)     All other components within normal limits   LIPASE - Abnormal; Notable for the following components:    Lipase 66 (*)     All other components within normal limits   INR   ALCOHOL ETHYL   DRUG ABUSE SCREEN 6 CHEM DEP URINE ([facility])   CBC WITH PLATELETS DIFFERENTIAL          Results for orders placed or performed during the hospital encounter of 05/08/19   Comprehensive metabolic panel   Result Value Ref Range    Sodium 135 133 - 144 mmol/L    Potassium 4.0 3.4 - 5.3 mmol/L    Chloride 99 94 - 109 mmol/L    Carbon Dioxide 22 20 - 32 mmol/L    Anion Gap 14 3 - 14 mmol/L    Glucose 88 70 - 99 mg/dL    Urea Nitrogen 8 7 - 30 mg/dL    Creatinine 0.59 (L) 0.66 - 1.25 mg/dL    GFR Estimate >90 >60 mL/min/[1.73_m2]    GFR " Estimate If Black >90 >60 mL/min/[1.73_m2]    Calcium 8.4 (L) 8.5 - 10.1 mg/dL    Bilirubin Total 1.0 0.2 - 1.3 mg/dL    Albumin 3.9 3.4 - 5.0 g/dL    Protein Total 7.2 6.8 - 8.8 g/dL    Alkaline Phosphatase 65 40 - 150 U/L    ALT 65 0 - 70 U/L    AST Canceled, Test credited 0 - 45 U/L   INR   Result Value Ref Range    INR 1.06 0.86 - 1.14   Lipase   Result Value Ref Range    Lipase 66 (L) 73 - 393 U/L   Alcohol level blood   Result Value Ref Range    Ethanol g/dL <0.01 <0.01 g/dL       Assessments & Plan (with Medical Decision Making)   48-year-old male with long-standing history of alcohol abuse presents with complaints that he had a seizure and that he needs his Keppra.  Patient had no significant physical findings on exam other than mild tremulousness and elevated blood pressure.  He was most interested in getting a prescription for Keppra stating that he has been prescribed this for the past 6 years.  We discussed at length that treatment of alcohol withdrawal seizures is prolonged abstinence.  Laboratories were obtained including CBC, comprehensive metabolic panel, INR, lipase and ethanol all all of which were at baseline or unremarkable.  I agreed to give the patient 2 days worth of Keppra.  He is restricted to a provider in [place] which she was encouraged to visit.  He was also encouraged to maintain abstinence from alcohol.    I have reviewed the nursing notes.    I have reviewed the findings, diagnosis, plan and need for follow up with the patient.       Medication List      There are no discharge medications for this visit.         Final diagnoses:   Alcohol abuse   Encounter for medication refill   Tena SANTACRUZ, am serving as a trained medical scribe to document services personally performed by Richard Francis MD, based on the provider's statements to me.   Richard SANTACRUZ MD, was physically present and have reviewed and verified the accuracy of this note documented by Tena Wagner.    5/8/2019   [facility],  [place], EMERGENCY DEPARTMENT     Peter Francis MD  05/08/19 5138

## 2019-05-08 NOTE — ED AVS SNAPSHOT
[facility], [place], Emergency Department  [address] [facility] [address] [address]  Phone:  [phone]                                    Anirudh Motley   MRN: [id number]    Department:  [facility], [place], Emergency Department   Date of Visit:  5/8/2019           After Visit Summary Signature Page    I have received my discharge instructions, and my questions have been answered. I have discussed any challenges I see with this plan with the nurse or doctor.    ..........................................................................................................................................  Patient/Patient Representative Signature      ..........................................................................................................................................  Patient Representative Print Name and Relationship to Patient    ..................................................               ................................................  Date                                   Time    ..........................................................................................................................................  Reviewed by Signature/Title    ...................................................              ..............................................  Date                                               Time          22EPIC Rev 08/18

## 2019-11-11 ENCOUNTER — OFFICE VISIT (OUTPATIENT)
Dept: FAMILY MEDICINE | Facility: CLINIC | Age: 49
End: 2019-11-11
Payer: COMMERCIAL

## 2019-11-11 VITALS
TEMPERATURE: 98.2 F | WEIGHT: 209.1 LBS | OXYGEN SATURATION: 97 % | DIASTOLIC BLOOD PRESSURE: 83 MMHG | RESPIRATION RATE: 16 BRPM | SYSTOLIC BLOOD PRESSURE: 116 MMHG | HEART RATE: 88 BPM | BODY MASS INDEX: 33.61 KG/M2 | HEIGHT: 66 IN

## 2019-11-11 DIAGNOSIS — F10.10 ALCOHOL ABUSE: ICD-10-CM

## 2019-11-11 DIAGNOSIS — Z00.00 ENCOUNTER FOR PREVENTIVE HEALTH EXAMINATION: Primary | ICD-10-CM

## 2019-11-11 DIAGNOSIS — Z11.3 ROUTINE SCREENING FOR STI (SEXUALLY TRANSMITTED INFECTION): ICD-10-CM

## 2019-11-11 LAB
ALBUMIN SERPL-MCNC: 4.4 G/DL (ref 3.4–5)
ALP SERPL-CCNC: 66 U/L (ref 40–150)
ALT SERPL W P-5'-P-CCNC: 73 U/L (ref 0–70)
ANION GAP SERPL CALCULATED.3IONS-SCNC: 7 MMOL/L (ref 3–14)
AST SERPL W P-5'-P-CCNC: 33 U/L (ref 0–45)
BILIRUB DIRECT SERPL-MCNC: 0.2 MG/DL (ref 0–0.2)
BILIRUB SERPL-MCNC: 1 MG/DL (ref 0.2–1.3)
BUN SERPL-MCNC: 16 MG/DL (ref 7–30)
CALCIUM SERPL-MCNC: 9.3 MG/DL (ref 8.5–10.1)
CHLORIDE SERPL-SCNC: 106 MMOL/L (ref 94–109)
CHOLEST SERPL-MCNC: 157 MG/DL
CO2 SERPL-SCNC: 25 MMOL/L (ref 20–32)
CREAT SERPL-MCNC: 0.71 MG/DL (ref 0.66–1.25)
ERYTHROCYTE [DISTWIDTH] IN BLOOD BY AUTOMATED COUNT: 13.2 % (ref 10–15)
GFR SERPL CREATININE-BSD FRML MDRD: >90 ML/MIN/{1.73_M2}
GLUCOSE SERPL-MCNC: 95 MG/DL (ref 70–99)
HBA1C MFR BLD: 5 % (ref 4.1–5.7)
HCT VFR BLD AUTO: 44 % (ref 40–53)
HDLC SERPL-MCNC: 50 MG/DL
HGB BLD-MCNC: 15 G/DL (ref 13.3–17.7)
LDLC SERPL CALC-MCNC: 69 MG/DL
MCH RBC QN AUTO: 31.6 PG (ref 26.5–33)
MCHC RBC AUTO-ENTMCNC: 34.1 G/DL (ref 31.5–36.5)
MCV RBC AUTO: 93 FL (ref 78–100)
NONHDLC SERPL-MCNC: 108 MG/DL
PLATELET # BLD AUTO: 232 10E9/L (ref 150–450)
POTASSIUM SERPL-SCNC: 4.1 MMOL/L (ref 3.4–5.3)
PROT SERPL-MCNC: 8.3 G/DL (ref 6.8–8.8)
RBC # BLD AUTO: 4.75 10E12/L (ref 4.4–5.9)
SODIUM SERPL-SCNC: 138 MMOL/L (ref 133–144)
TRIGL SERPL-MCNC: 191 MG/DL
WBC # BLD AUTO: 6.6 10E9/L (ref 4–11)

## 2019-11-11 ASSESSMENT — PATIENT HEALTH QUESTIONNAIRE - PHQ9
SUM OF ALL RESPONSES TO PHQ QUESTIONS 1-9: 7
5. POOR APPETITE OR OVEREATING: MORE THAN HALF THE DAYS

## 2019-11-11 ASSESSMENT — ANXIETY QUESTIONNAIRES
1. FEELING NERVOUS, ANXIOUS, OR ON EDGE: MORE THAN HALF THE DAYS
3. WORRYING TOO MUCH ABOUT DIFFERENT THINGS: MORE THAN HALF THE DAYS
GAD7 TOTAL SCORE: 11
2. NOT BEING ABLE TO STOP OR CONTROL WORRYING: MORE THAN HALF THE DAYS
IF YOU CHECKED OFF ANY PROBLEMS ON THIS QUESTIONNAIRE, HOW DIFFICULT HAVE THESE PROBLEMS MADE IT FOR YOU TO DO YOUR WORK, TAKE CARE OF THINGS AT HOME, OR GET ALONG WITH OTHER PEOPLE: VERY DIFFICULT
5. BEING SO RESTLESS THAT IT IS HARD TO SIT STILL: SEVERAL DAYS
7. FEELING AFRAID AS IF SOMETHING AWFUL MIGHT HAPPEN: SEVERAL DAYS
6. BECOMING EASILY ANNOYED OR IRRITABLE: SEVERAL DAYS

## 2019-11-11 ASSESSMENT — MIFFLIN-ST. JEOR: SCORE: 1749.87

## 2019-11-11 NOTE — NURSING NOTE
"48 year old  Chief Complaint   Patient presents with     Physical       Blood pressure 116/83, pulse 88, temperature 98.2  F (36.8  C), temperature source Oral, resp. rate 16, height 1.666 m (5' 5.6\"), weight 94.8 kg (209 lb 1.6 oz), SpO2 97 %. Body mass index is 34.16 kg/m .  BP completed using cuff size:    Patient Active Problem List   Diagnosis     Alcohol abuse with intoxication (H)     Alcohol intoxication (H)     Alcohol causing toxic effect, accidental or unintentional, subsequent encounter     Chemical dependency (H)       Wt Readings from Last 2 Encounters:   11/11/19 94.8 kg (209 lb 1.6 oz)   03/22/19 101.2 kg (223 lb)     BP Readings from Last 3 Encounters:   11/11/19 116/83   05/08/19 150/80   03/22/19 125/84       Allergies   Allergen Reactions     Latex      Penicillins Hives and Rash       Current Outpatient Medications   Medication     levETIRAcetam (KEPPRA) 1000 MG tablet     traZODone (DESYREL) 50 MG tablet     No current facility-administered medications for this visit.        Social History     Tobacco Use     Smoking status: Never Smoker     Smokeless tobacco: Former User   Substance Use Topics     Alcohol use: Yes     Drug use: No       Honoring Choices - Health Care Directive Guide offered to patient at time of visit.    Health Maintenance Due   Topic Date Due     PREVENTIVE CARE VISIT  1969     HIV SCREENING  11/25/1984     PNEUMOCOCCAL IMMUNIZATION 19-64 MEDIUM RISK (1 of 1 - PPSV23) 11/25/1988     DTAP/TDAP/TD IMMUNIZATION (1 - Tdap) 11/25/1994     LIPID  11/25/2004     PHQ-2  01/01/2019     ZOSTER IMMUNIZATION (1 of 2) 11/25/2019       There is no immunization history for the selected administration types on file for this patient.    No results found for: PAP      Recent Labs   Lab Test 05/08/19  1346 02/19/19  1024 02/18/19  1555   ALT 65 31 38   CR 0.59* 0.74 0.66   GFRESTIMATED >90 >90 >90   GFRESTBLACK >90 >90 >90   ALBUMIN 3.9 4.1 4.0   POTASSIUM 4.0 3.4 4.6   TSH  --   --  " 0.97       No flowsheet data found.    PHQ-9 SCORE 11/11/2019   PHQ-9 Total Score 7       HEATHER-7 SCORE 11/11/2019   Total Score 11       No flowsheet data found.      Tonia Cartwright CMA  November 11, 2019 8:53 AM

## 2019-11-11 NOTE — PROGRESS NOTES
" SUBJECTIVE:   Anirudhjanine Motley is an 48 year old year old man who presents for preventive health visit. *** needs an admission physical to [facility] chemical dependency treatment facility and was admitted 11/6/19  Patient has a history of chemical dependency to alcohol, clean date 11/4/19  Last visit to the dentist was several years ago; last time was for an extraction 2 years ago   Last visit to an eye provider was several years ago; is in need of referral reports watery and itching eyes \"constantly\"           Healthy Habits:    Amount of exercise or daily activities, outside of work: \"walk a lot\"     Problems taking medications regularly No    Medication side effects: No    Have you had an eye exam in the past two years? no    Do you see a dentist twice per year? no    Do you have sleep apnea, excessive snoring or daytime drowsiness?no    Water    Caffeine    Sleep    Calcium   {Outside tests to abstract? :882690}    {additional problems to add (Optional):016088}    Preventive Health Screenings:  Colonoscopy:  HIV:   STI:   A1c:   Lung cancer screening:   Lipids:  HCV:   Vaccinations:   PSA:  Abdominal Aortic Aneurysm:     Today's PHQ-2 Score:     {PHQ-2 LOOK IN ASSESSMENTS (Optional) :891352}  Abuse: Current or Past(Physical, Sexual or Emotional)- {YES/NO/NA:914478}  Do you feel safe in your environment - {YES/NO/NA:372182}    Past Medical History:   Diagnosis Date     Alcohol dependence (H)     lives in wet house.  Per family has had seizures associated with EtOH in past     Anxiety      Depression      Seizures (H)      Substance abuse (H)      History reviewed. No pertinent surgical history.  Social History     Socioeconomic History     Marital status: Single     Spouse name: Not on file     Number of children: Not on file     Years of education: Not on file     Highest education level: Not on file   Occupational History     Not on file   Social Needs     Financial resource strain: Not on file     Food insecurity: " "    Worry: Not on file     Inability: Not on file     Transportation needs:     Medical: Not on file     Non-medical: Not on file   Tobacco Use     Smoking status: Never Smoker     Smokeless tobacco: Former User   Substance and Sexual Activity     Alcohol use: Yes     Drug use: No     Sexual activity: Not on file   Lifestyle     Physical activity:     Days per week: Not on file     Minutes per session: Not on file     Stress: Not on file   Relationships     Social connections:     Talks on phone: Not on file     Gets together: Not on file     Attends [employer] service: Not on file     Active member of club or organization: Not on file     Attends meetings of clubs or organizations: Not on file     Relationship status: Not on file     Intimate partner violence:     Fear of current or ex partner: Not on file     Emotionally abused: Not on file     Physically abused: Not on file     Forced sexual activity: Not on file   Other Topics Concern     Not on file   Social History Narrative     Not on file     History reviewed. No pertinent family history.    If you drink alcohol do you typically have >3 drinks per day or >10 drinks per week? {ETOH :994093}                     Reviewed orders with patient.  Reviewed health maintenance and updated orders accordingly - {Yes/No:616873::\"Yes\"}  {Chronicprobdata (Optional):248965}    Current Outpatient Medications   Medication Sig Dispense Refill     levETIRAcetam (KEPPRA) 1000 MG tablet Take 1 tablet (1,000 mg) by mouth 2 times daily (Patient not taking: Reported on 11/11/2019) 5 tablet 0     traZODone (DESYREL) 50 MG tablet Take 1 tablet (50 mg) by mouth nightly as needed for sleep (Patient not taking: Reported on 11/11/2019) 30 tablet 0          Allergies   Allergen Reactions     Latex      Penicillins Hives and Rash            ROS:  Constitutional: no fevers, chills, night sweats or unintentional weight change   Eyes: no vision change, diplopia or red eyes   Ears, Nose, Mouth, " "Throat: no tinnitus or hearing change, no epistaxis or nasal discharge, no oral lesions, throat clear   Cardiovascular: no chest pain, palpitations, or pain with walking, no orthopnea or PND   Respiratory: no dyspnea, cough, shortness of breath or wheezing   GI: no nausea, vomiting, diarrhea or constipation, no abdominal pain   : no change in urine, no dysuria or hematuria, no sexual dysfunction   Musculoskeletal: no joint or muscle pain or swelling   Integumentary: no concerning lesions or moles   Neuro: no loss of strength or sensation, no numbness or tingling, no tremor, no dizziness, no headache, no gait disturbance   Endo: no polyuria or polydipsia, no temperature intolerance   Heme/Lymph: no concerning bumps, no bleeding problems   Allergy: no environmental allergies   Psych: no depression or anxiety, no sleep problems    OBJECTIVE:   /83   Pulse 88   Temp 98.2  F (36.8  C) (Oral)   Resp 16   Ht 1.666 m (5' 5.6\")   Wt 94.8 kg (209 lb 1.6 oz)   SpO2 97%   BMI 34.16 kg/m    EXAM:  {Exam Choices:265258}    ASSESSMENT/PLAN:   {Diag Picklist:224320}    COUNSELING:       {BP Counseling- Complete if BP >= 120/80  (Optional):594288}     reports that he has never smoked. Doreen has never used smokeless tobacco.  {Tobacco Cessation -- Complete if patient is a smoker (Optional):942031}  Estimated body mass index is 23.78 kg/(m^2) as calculated from the following:    Height as of this encounter: 5' 2\" (157.5 cm).    Weight as of this encounter: 130 lb (59 kg).   {Weight Management Plan (ACO) Complete if BMI is abnormal-  Ages 18-64  BMI >24.9.  Age 65+ with BMI <23 or >30 (Optional):611997}    Counseling Resources:  ATP IV Guidelines  Pooled Cohorts Equation Calculator  ICSI Preventive Guidelines  Dietary Guidelines for Americans, 2010  USDA's MyPlate  ASA Prophylaxis  Lung CA Screening    Options for treatment and follow-up care were reviewed with the patient. Anirudh Motley   engaged in the decision making " process and verbalized understanding of the options discussed and agreed with the final plan.

## 2019-11-11 NOTE — LETTER
November 12, 2019    [facility] AURELIO Motley  [address]   [facility] [address]    Dear ,    We are writing to inform you of your test results.    Your test results fall within the expected range(s) or remain unchanged from previous results.  Please continue with current treatment plan.    Resulted Orders   Hemoglobin A1c ([facility])   Result Value Ref Range    Hemoglobin A1C 5.0 4.1 - 5.7 %   Lipid panel reflex to direct LDL Fasting   Result Value Ref Range    Cholesterol 157 <200 mg/dL    Triglycerides 191 (H) <150 mg/dL      Comment:      Borderline high:  150-199 mg/dl  High:             200-499 mg/dl  Very high:       >499 mg/dl      HDL Cholesterol 50 >39 mg/dL    LDL Cholesterol Calculated 69 <100 mg/dL      Comment:      Desirable:       <100 mg/dl    Non HDL Cholesterol 108 <130 mg/dL   Basic metabolic panel   Result Value Ref Range    Sodium 138 133 - 144 mmol/L    Potassium 4.1 3.4 - 5.3 mmol/L    Chloride 106 94 - 109 mmol/L    Carbon Dioxide 25 20 - 32 mmol/L    Anion Gap 7 3 - 14 mmol/L    Glucose 95 70 - 99 mg/dL    Urea Nitrogen 16 7 - 30 mg/dL    Creatinine 0.71 0.66 - 1.25 mg/dL    GFR Estimate >90 >60 mL/min/[1.73_m2]      Comment:      Non  GFR Calc  Starting 12/18/2018, serum creatinine based estimated GFR (eGFR) will be   calculated using the Chronic Kidney Disease Epidemiology Collaboration   (CKD-EPI) equation.      GFR Estimate If Black >90 >60 mL/min/[1.73_m2]      Comment:       GFR Calc  Starting 12/18/2018, serum creatinine based estimated GFR (eGFR) will be   calculated using the Chronic Kidney Disease Epidemiology Collaboration   (CKD-EPI) equation.      Calcium 9.3 8.5 - 10.1 mg/dL   Hepatic panel   Result Value Ref Range    Bilirubin Direct 0.2 0.0 - 0.2 mg/dL    Bilirubin Total 1.0 0.2 - 1.3 mg/dL    Albumin 4.4 3.4 - 5.0 g/dL    Protein Total 8.3 6.8 - 8.8 g/dL    Alkaline Phosphatase 66 40 - 150 U/L    ALT 73 (H) 0 - 70 U/L    AST 33 0 -  45 U/L   Treponema Abs w Reflex to RPR and Titer   Result Value Ref Range    Treponema Antibodies Nonreactive NR^Nonreactive   CBC with platelets   Result Value Ref Range    WBC 6.6 4.0 - 11.0 10e9/L    RBC Count 4.75 4.4 - 5.9 10e12/L    Hemoglobin 15.0 13.3 - 17.7 g/dL    Hematocrit 44.0 40.0 - 53.0 %    MCV 93 78 - 100 fl    MCH 31.6 26.5 - 33.0 pg    MCHC 34.1 31.5 - 36.5 g/dL    RDW 13.2 10.0 - 15.0 %    Platelet Count 232 150 - 450 10e9/L       If you have any questions or concerns, please call the clinic at the number listed above.     Sincerely,    DARLEEN Hurt CNP

## 2019-11-11 NOTE — PROGRESS NOTES
" SUBJECTIVE:   Anirudh Motley is an 48 year old year old man who presents for preventive health visit. [facility] needs an admission physical to [facility] chemical dependency treatment facility and was admitted 11/6/19  Patient has a history of chemical dependency to alcohol, clean date 11/4/19  Last visit to the dentist was several years ago; last time was for an extraction 2 years ago   Last visit to an eye provider was several years ago; is in need of referral reports watery and itching eyes \"constantly\"     Alcohol abuse: longstanding for several years; has history of binge drinking disorder with associated seizures during withdrawal. Has been to treatment 12+ times for alcohol abuse disorder. Will drink upwards of 1L of vodka a day for several days multiple times a month. Has been to the hospital for acute alcohol intoxication 21 times since Feb 2019. Endorses history of gastritis without any eosphageal varicies, liver function as \"okay\", and denies any jaundice, abdominal pain, dark colored urine, francisco colored stools, or itching of skin.       Healthy Habits:    Amount of exercise or daily activities, outside of work: \"walk a lot\"     Problems taking medications regularly No    Medication side effects: No    Have you had an eye exam in the past two years? no    Do you see a dentist twice per year? no    Do you have sleep apnea, excessive snoring or daytime drowsiness?no    Preventive Health Screenings:  Colonoscopy: completed 2016 with multiple polyps throughout.   HIV: completed today 11/11/19  STI: completed today 11/11/19  A1c: completed today 11/11/19  Lung cancer screening: not indicated  Lipids: completed today 11/11/19  HCV: not indicated  Vaccinations: current on Tdap, influenza; declined pneumococcal.   PSA: not indicated  Abdominal Aortic Aneurysm: not indicated    Discussed PHQ9 and GAD7 questionnaire responses today;     PHQ-9 SCORE 11/11/2019   PHQ-9 Total Score 7     HEATHER-7 SCORE 11/11/2019   Total Score 11 "       Abuse: Current or Past(Physical, Sexual or Emotional)- No  Do you feel safe in your environment - Yes    Past Medical History:   Diagnosis Date     Alcohol dependence (H)     lives in wet house.  Per family has had seizures associated with EtOH in past     Anxiety      Depression      Diverticulosis      Seizures (H)      Substance abuse (H)      Past Surgical History:   Procedure Laterality Date     wisdom teeth extraction       Social History     Socioeconomic History     Marital status: Single     Spouse name: Not on file     Number of children: Not on file     Years of education: Not on file     Highest education level: Not on file   Occupational History     Not on file   Social Needs     Financial resource strain: Not on file     Food insecurity:     Worry: Not on file     Inability: Not on file     Transportation needs:     Medical: Not on file     Non-medical: Not on file   Tobacco Use     Smoking status: Never Smoker     Smokeless tobacco: Former User   Substance and Sexual Activity     Alcohol use: Yes     Drug use: No     Sexual activity: Not on file   Lifestyle     Physical activity:     Days per week: Not on file     Minutes per session: Not on file     Stress: Not on file   Relationships     Social connections:     Talks on phone: Not on file     Gets together: Not on file     Attends Catholic service: Not on file     Active member of club or organization: Not on file     Attends meetings of clubs or organizations: Not on file     Relationship status: Not on file     Intimate partner violence:     Fear of current or ex partner: Not on file     Emotionally abused: Not on file     Physically abused: Not on file     Forced sexual activity: Not on file   Other Topics Concern     Not on file   Social History Narrative     Not on file     History reviewed. No pertinent family history.    If you drink alcohol do you typically have >3 drinks per day or >10 drinks per week? Not Applicable: Currently in  "treatment for alcohol abuse.                      Reviewed orders with patient.  Reviewed health maintenance and updated orders accordingly - Yes      Current Outpatient Medications   Medication Sig Dispense Refill     levETIRAcetam (KEPPRA) 1000 MG tablet Take 1 tablet (1,000 mg) by mouth 2 times daily (Patient not taking: Reported on 11/11/2019) 5 tablet 0     traZODone (DESYREL) 50 MG tablet Take 1 tablet (50 mg) by mouth nightly as needed for sleep (Patient not taking: Reported on 11/11/2019) 30 tablet 0        Allergies   Allergen Reactions     Latex      Penicillins Hives and Rash       ROS:  Constitutional: no fevers, chills, night sweats or unintentional weight change   Eyes: no vision change, diplopia or red eyes   Ears, Nose, Mouth, Throat: no tinnitus or hearing change, no epistaxis or nasal discharge, no oral lesions, throat clear   Cardiovascular: no chest pain, palpitations, or pain with walking, no orthopnea or PND   Respiratory: no dyspnea, cough, shortness of breath or wheezing   GI: no nausea, vomiting, diarrhea or constipation, no abdominal pain   : no change in urine, no dysuria or hematuria, no sexual dysfunction   Musculoskeletal: no joint or muscle pain or swelling   Integumentary: no concerning lesions or moles   Neuro: no loss of strength or sensation, no numbness or tingling, no tremor, no dizziness, no headache, no gait disturbance   Endo: no polyuria or polydipsia, no temperature intolerance   Heme/Lymph: no concerning bumps, no bleeding problems   Allergy: no environmental allergies   Psych: no depression or anxiety, no sleep problems    OBJECTIVE:   /83   Pulse 88   Temp 98.2  F (36.8  C) (Oral)   Resp 16   Ht 1.666 m (5' 5.6\")   Wt 94.8 kg (209 lb 1.6 oz)   SpO2 97%   BMI 34.16 kg/m    EXAM:  GENERAL: healthy, alert and no distress  EYES: Eyes grossly normal to inspection, PERRL and conjunctivae and sclerae normal  HENT: ear canals and TM's normal, nose and mouth without " "ulcers or lesions  NECK: no adenopathy, no asymmetry, masses, or scars and thyroid normal to palpation  RESP: lungs clear to auscultation - no rales, rhonchi or wheezes  CV: regular rate and rhythm, normal S1 S2, no S3 or S4, no murmur, click or rub, no peripheral edema and peripheral pulses strong  ABDOMEN: soft, obese, nontender, no hepatosplenomegaly, no masses and bowel sounds normal  MS: no gross musculoskeletal defects noted, no edema  SKIN: no suspicious lesions or rashes  NEURO: Normal strength and tone, mentation intact and speech normal  PSYCH: mentation appears normal, affect normal/bright  Declined genital examination today     ASSESSMENT/PLAN:   1. Encounter for preventive health examination  *Exercise recommended: goal from American Heart Association is 150 minutes/week (30 minutes 5 days a week)  *Sunscreen to exposed skin when outdoors >15 minutes during spring and summer months  *Use seatbelts while in vehicles  *Use helmets with bicycling/scooter use  *Limit alcohol consumption to less than 7 drinks/week if a woman, 14 if male, and less than five at any one time  - Hemoglobin A1c ([facility])  - Lipid panel reflex to direct LDL Fasting  - Basic metabolic panel  - CBC with platelets    2. Routine screening for STI (sexually transmitted infection)  Advised condom use with all sexual partners  - HIV Antigen Antibody Combo  - NEISSERIA GONORRHOEA PCR  - CHLAMYDIA TRACHOMATIS PCR  - Treponema Abs w Reflex to RPR and Titer    3. Alcohol abuse  Continue with treatment at [facility] for chemical dependency and alcohol abuse disorder.   - Hepatic panel  - CBC with platelets    COUNSELING:     Reports that he has never smoked. He has never used smokeless tobacco.    Estimated body mass index is Body mass index is 34.16 kg/m . as calculated from the following:    Height as of this encounter: 5' 5.6\"    Weight as of this encounter: 209 lbs 1.6 oz  Weight management plan: Discussed healthy diet and exercise " guidelines    Counseling Resources:  ATP IV Guidelines  Pooled Cohorts Equation Calculator  ICSI Preventive Guidelines  Dietary Guidelines for Americans, 2010  USDA's MyPlate  ASA Prophylaxis  Lung CA Screening    Options for treatment and follow-up care were reviewed with the patient. Anirudh Motley   engaged in the decision making process and verbalized understanding of the options discussed and agreed with the final plan.    Michelle Orozco, APRN CNP  November 14, 2019

## 2019-11-11 NOTE — PATIENT INSTRUCTIONS
1. Encounter for preventive health examination  *Exercise recommended: goal from American Heart Association is 150 minutes/week (30 minutes 5 days a week)  *Sunscreen to exposed skin when outdoors >15 minutes during spring and summer months  *Use seatbelts while in vehicles  *Use helmets with bicycling/scooter use  *Limit alcohol consumption to less than 7 drinks/week if a woman, 14 if male, and less than five at any one time  - Hemoglobin A1c ([facility])  - Lipid panel reflex to direct LDL Fasting  - Basic metabolic panel  - CBC with platelets    2. Routine screening for STI (sexually transmitted infection)  Screening completed today. If you are with a new partner please use protection to prevent contacting any sexually transmitted infection.   - HIV Antigen Antibody Combo  - NEISSERIA GONORRHOEA PCR  - CHLAMYDIA TRACHOMATIS PCR  - Treponema Abs w Reflex to RPR and Titer    3. Alcohol abuse  Continue with [facility] for sobriety care and treatment.   - Hepatic panel  - CBC with platelets

## 2019-11-12 LAB
HIV 1+2 AB+HIV1 P24 AG SERPL QL IA: NONREACTIVE
T PALLIDUM AB SER QL: NONREACTIVE

## 2019-11-12 ASSESSMENT — ANXIETY QUESTIONNAIRES: GAD7 TOTAL SCORE: 11

## 2019-11-13 LAB
C TRACH DNA SPEC QL NAA+PROBE: NEGATIVE
N GONORRHOEA DNA SPEC QL NAA+PROBE: NEGATIVE
SPECIMEN SOURCE: NORMAL
SPECIMEN SOURCE: NORMAL

## 2019-11-14 ENCOUNTER — TELEPHONE (OUTPATIENT)
Dept: FAMILY MEDICINE | Facility: CLINIC | Age: 49
End: 2019-11-14

## 2019-11-14 NOTE — TELEPHONE ENCOUNTER
Informed [facility] labs returned from intake physical are largely WNL; slight elevation in ALT, however does not require additional workup at this time as it is not five times greater than upper limit.   Michelle Orozco, APRN CNP  November 14, 2019

## 2019-11-20 ENCOUNTER — OFFICE VISIT (OUTPATIENT)
Dept: FAMILY MEDICINE | Facility: CLINIC | Age: 49
End: 2019-11-20
Payer: COMMERCIAL

## 2019-11-20 VITALS
OXYGEN SATURATION: 98 % | TEMPERATURE: 98.5 F | BODY MASS INDEX: 34.64 KG/M2 | RESPIRATION RATE: 16 BRPM | DIASTOLIC BLOOD PRESSURE: 90 MMHG | SYSTOLIC BLOOD PRESSURE: 127 MMHG | HEART RATE: 67 BPM | WEIGHT: 212 LBS

## 2019-11-20 DIAGNOSIS — S67.191A CRUSHING INJURY OF LEFT INDEX FINGER, INITIAL ENCOUNTER: Primary | ICD-10-CM

## 2019-11-20 NOTE — PROGRESS NOTES
"       [facility] AURELIO Motley is a 48 year old male with a past medical history significant for substance abuse, alcohol abuse, seizures, diverticulosis, depression, and anxiety who presents for     Chief Complaint   Patient presents with     Finger     Anirudh presents today for evaluation of left finger injury. He reports that at about 9:30 pm last night he \"smashed\" his left index finger in a door. States his finger got caught where the door hinges while the door was closing. The affected finger was noted to bleed initially, ripped a the skin near the base of his nail bed. Patient reports he cleaned the wound right away, applied ice. Bruising is noted under the base of the affected nail. Bruising is noted to the tip of left index finger beginning at PIP joint and extending distally. Reports pain is currently 4/10 in severity. Reports numbness in the tip of the affected finger. He denies fevers, warmth, or pus drainage.    Past Medical History:   Diagnosis Date     Alcohol dependence (H)     lives in wet house.  Per family has had seizures associated with EtOH in past     Anxiety      Depression      Diverticulosis      Seizures (H)      Substance abuse (H)      No current outpatient medications on file.     No current facility-administered medications for this visit.         Allergies   Allergen Reactions     Latex      Penicillins Hives and Rash        Problem, Medication and Allergy Lists were reviewed and updated if needed..    Patient is an established patient of this clinic..         Review of Systems:   Review of Systems   Constitutional: Negative for fever.   Skin: Positive for color change and wound.        Bruise to tip of left index finger.           Physical Exam:     Vitals:    11/20/19 1048   BP: (!) 127/90   BP Location: Right arm   Patient Position: Sitting   Cuff Size: Adult Regular   Pulse: 67   Resp: 16   Temp: 98.5  F (36.9  C)   TempSrc: Oral   SpO2: 98%   Weight: 96.2 kg (212 lb)     Body mass " index is 34.64 kg/m .  Vital signs normal except elevated diastolic blood pressure.     Physical Exam  Constitutional:       General: He is not in acute distress.     Appearance: Normal appearance. He is not ill-appearing or toxic-appearing.   Cardiovascular:      Rate and Rhythm: Normal rate.   Pulmonary:      Effort: Pulmonary effort is normal. No respiratory distress.   Musculoskeletal:        Hands:       Comments: Small avulsion noted to base of nail bed on left index finger. Light bruising noted under the base of the affected nail. Left index finger is mildly swollen, bruising noted on palmar surface beginning at PIP joint and extending distally. pain on palpation to tip of left index finger. No signs of infection are present.   Skin:     General: Skin is warm and dry.   Neurological:      General: No focal deficit present.      Mental Status: He is alert and oriented to person, place, and time.   Psychiatric:         Mood and Affect: Mood normal.         Behavior: Behavior normal.         Results:     Imaging results pending:  XR Finger Left G/E 2 Views    Assessment and Plan      1. Crushing injury of left index finger, initial encounter  Comment: Pt with one day hx of left index finger pain following injury in which finger was caught in a closing door. On exam, small avulsion present to base of affected nail bed, bruising noted under the base of the nail. Left index finger noted to be bruised and mildly swollen beginning at PIP joint and extending distally, pain on palpation to tip of left index finger. No signs of infection present. Will obtain x-ray of left index finger to rule out fracture. Recommend supportive care in the meantime as outlined below.  Plan:   - XR Finger Left G/E 2 Views; Future   - Ibuprofen/tylenol for pain   - Apply ice to affected finger three times daily and as needed for comfort   - Counseled on reasons to return to clinic      Follow-up: Lab results pending, will follow-up as  results indicate.      There are no discontinued medications.    Options for treatment and follow-up care were reviewed with the patient. Anirudh Motley  engaged in the decision making process and verbalized understanding of the options discussed and agreed with the final plan.    DARLEEN Crespo CNP

## 2019-11-22 ASSESSMENT — ENCOUNTER SYMPTOMS
FEVER: 0
COLOR CHANGE: 1
WOUND: 1

## 2020-07-22 ENCOUNTER — DOCUMENTATION ONLY (OUTPATIENT)
Dept: CARE COORDINATION | Facility: CLINIC | Age: 50
End: 2020-07-22

## 2020-07-24 NOTE — TELEPHONE ENCOUNTER
DIAGNOSIS:Right shoulder pain from injury 7 yrs ago. appt per pt.    APPOINTMENT DATE: 7/30   NOTES STATUS DETAILS   OFFICE NOTE from referring provider n/a    OFFICE NOTE from other specialist Care everywhere    DISCHARGE SUMMARY from hospital n/a    DISCHARGE REPORT from the ER Internal 2/6/17     OPERATIVE REPORT n/a    MEDICATION LIST Care eveywhere    MRI n/a    CT SCAN recieved [facility]-6/14/20-cervical   XRAYS (IMAGES & REPORTS) recieved [facility]-5/10/17-rt shoulder

## 2020-07-30 ENCOUNTER — PRE VISIT (OUTPATIENT)
Dept: ORTHOPEDICS | Facility: CLINIC | Age: 50
End: 2020-07-30

## 2020-08-31 NOTE — TELEPHONE ENCOUNTER
THIS IS THE INCORRECT PATIENT, Name is actually Anirudh Motley    DIAGNOSIS:    APPOINTMENT DATE: 9/9/20   NOTES STATUS DETAILS   OFFICE NOTE from referring provider     OFFICE NOTE from other specialist     DISCHARGE SUMMARY from hospital     DISCHARGE REPORT from the ER     OPERATIVE REPORT     MEDICATION LIST     MRI     CT SCAN     XRAYS (IMAGES & REPORTS)       8/31  appointment note stated the patient had recods at [facility]  Unable to query records from [facility] in Care Everywhere, sent request to [facility] medical records ept.    Records Requested  09/04/20    Facility  [facility]  Fax: [phone]   Outcome * 9/4/20 7:16 AM Faxed urgent req to PN for records - Mary Jo

## 2020-09-04 DIAGNOSIS — M25.511 CHRONIC RIGHT SHOULDER PAIN: Primary | ICD-10-CM

## 2020-09-04 DIAGNOSIS — G89.29 CHRONIC RIGHT SHOULDER PAIN: Primary | ICD-10-CM

## 2020-09-08 ENCOUNTER — ANCILLARY PROCEDURE (OUTPATIENT)
Dept: GENERAL RADIOLOGY | Facility: CLINIC | Age: 50
End: 2020-09-08
Attending: ORTHOPAEDIC SURGERY
Payer: COMMERCIAL

## 2020-09-08 ENCOUNTER — OFFICE VISIT (OUTPATIENT)
Dept: ORTHOPEDICS | Facility: CLINIC | Age: 50
End: 2020-09-08
Payer: COMMERCIAL

## 2020-09-08 VITALS — WEIGHT: 200 LBS | BODY MASS INDEX: 29.62 KG/M2 | HEIGHT: 69 IN

## 2020-09-08 DIAGNOSIS — G89.29 CHRONIC RIGHT SHOULDER PAIN: ICD-10-CM

## 2020-09-08 DIAGNOSIS — M19.011 LOCALIZED OSTEOARTHRITIS OF RIGHT SHOULDER: Primary | ICD-10-CM

## 2020-09-08 DIAGNOSIS — M25.511 CHRONIC RIGHT SHOULDER PAIN: ICD-10-CM

## 2020-09-08 ASSESSMENT — MIFFLIN-ST. JEOR: SCORE: 1757.57

## 2020-09-08 NOTE — LETTER
9/8/2020      RE: Anirudh Motley  [address] [facility] [address]       Service Date: 09/08/2020      CHIEF COMPLAINT:  Right shoulder pain.      HISTORY OF PRESENT ILLNESS:  Anirudh is a very pleasant 49-year-old male with right shoulder pain.  He is right-hand dominant.  His pain is anterior and lateral.  His pain radiates down his arm.  It wakes him up at night.  There is no history of trauma.  No remote history of dislocation or subluxation.  He does not complain of any instability.  He has had no physical therapy.  He has had no injections.  He does try some over-the-counter medication.      ALLERGIES:   1.  Penicillin.   2.  Latex.        PAST MEDICAL HISTORY:  Noncontributory.      PAST SURGICAL HISTORY:  Noncontributory.      FAMILY HISTORY:  Negative for DVT phenomena, bleeding disorders, anesthetic complications.      REVIEW OF SYSTEMS:  The patient denies chest pain, shortness of breath, gastrointestinal complaints, diabetes.  Remaining complete review of systems is negative.      SOCIAL HISTORY:  The patient works in manufacturing.  He does not smoke.  He enjoys outdoor activities.      PHYSICAL EXAMINATION:  49-year-old male, alert and oriented, in no apparent distress.  Breathing unlabored.  Skin without dystrophic changes.  Sensation intact to touch.  Cervical spine nontender, full range of motion, negative Spurling.      Evaluation of bilateral shoulder girdle reveals range of motion on the right shoulder to 130 degrees of forward flexion, 30 degrees of external rotation, 85 degrees of external rotation in abduction, 0 degrees of internal rotation in abduction.  This compares to the left shoulder of 160/90/30.  His rotator cuff strength is 5/5 bilaterally.  He has no AC joint tenderness.  He has some slight biceps tenderness.  No tenderness laterally.  He has pain with the Neer maneuver.  More pain with a Leong maneuver.  No cross-arm.  He has no glenohumeral crepitation.      RADIOGRAPHS:  I  personally reviewed the patient's radiographs.  He does have moderate glenohumeral osteoarthritis.      IMPRESSION:  Lata Motley is a 49-year-old male with right shoulder osteoarthritis.  I had a long conversation with [facility] regarding this problem.  I explained that we really do not have a cure for osteoarthritis.  We manage the symptoms over time.  We discussed treatment options including physical therapy, intra-articular corticosteroid injection as well as surgical management.  We think it is best to start with rehabilitation to work on a gentle capsular stretching program as well as a strengthening program.  I also suggested an intra-articular corticosteroid injection and is interested in this.      PLAN:   1.  The patient will be given a referral to start physical therapy.   2.  We will make [facility] an appointment for an ultrasound-guided intra-articular corticosteroid injection.   3.  He will follow up with me in 3-4 months for a routine recheck.         CHACHO LOCKETT MD             D: 2020   T: 2020   MT: jessica      Name:     LATA MOTLEY   MRN:      [id number]        Account:      [id number]   :      1970           Service Date: 2020      Document: [id number]       Chacho Lockett MD

## 2020-09-08 NOTE — NURSING NOTE
"Reason For Visit:   Chief Complaint   Patient presents with     Consult     Right shoulder       Occupation: Industrial field   Currently working? Yes.  Work status?  Full time.  Date of injury: Bothersome over the last 7 years. Possible injuries over the years.  Type of injury: NA, bothersome when laying on the right side, reaching, overhead motion.     Date of surgery: NA  Type of surgery: NA.  Smoker: No  Request smoking cessation information: No    Pain Assessment  Patient Currently in Pain: Yes  Primary Pain Location: Shoulder    Ht 1.753 m (5' 9\")   Wt 90.7 kg (200 lb)   BMI 29.53 kg/m           Allergies   Allergen Reactions     Latex      Penicillins Hives and Rash       No current outpatient medications on file.     No current facility-administered medications for this visit.          Delmy Cardenas, ATC    "

## 2020-09-09 ENCOUNTER — PRE VISIT (OUTPATIENT)
Dept: ORTHOPEDICS | Facility: CLINIC | Age: 50
End: 2020-09-09

## 2020-09-13 NOTE — PROGRESS NOTES
Service Date: 09/08/2020      CHIEF COMPLAINT:  Right shoulder pain.      HISTORY OF PRESENT ILLNESS:  Anirudh is a very pleasant 49-year-old male with right shoulder pain.  He is right-hand dominant.  His pain is anterior and lateral.  His pain radiates down his arm.  It wakes him up at night.  There is no history of trauma.  No remote history of dislocation or subluxation.  He does not complain of any instability.  He has had no physical therapy.  He has had no injections.  He does try some over-the-counter medication.      ALLERGIES:   1.  Penicillin.   2.  Latex.        PAST MEDICAL HISTORY:  Noncontributory.      PAST SURGICAL HISTORY:  Noncontributory.      FAMILY HISTORY:  Negative for DVT phenomena, bleeding disorders, anesthetic complications.      REVIEW OF SYSTEMS:  The patient denies chest pain, shortness of breath, gastrointestinal complaints, diabetes.  Remaining complete review of systems is negative.      SOCIAL HISTORY:  The patient works in manufacturing.  He does not smoke.  He enjoys outdoor activities.      PHYSICAL EXAMINATION:  49-year-old male, alert and oriented, in no apparent distress.  Breathing unlabored.  Skin without dystrophic changes.  Sensation intact to touch.  Cervical spine nontender, full range of motion, negative Spurling.      Evaluation of bilateral shoulder girdle reveals range of motion on the right shoulder to 130 degrees of forward flexion, 30 degrees of external rotation, 85 degrees of external rotation in abduction, 0 degrees of internal rotation in abduction.  This compares to the left shoulder of 160/90/30.  His rotator cuff strength is 5/5 bilaterally.  He has no AC joint tenderness.  He has some slight biceps tenderness.  No tenderness laterally.  He has pain with the Neer maneuver.  More pain with a Leong maneuver.  No cross-arm.  He has no glenohumeral crepitation.      RADIOGRAPHS:  I personally reviewed the patient's radiographs.  He does have moderate  glenohumeral osteoarthritis.      IMPRESSION:  Lata Motley is a 49-year-old male with right shoulder osteoarthritis.  I had a long conversation with [facility] regarding this problem.  I explained that we really do not have a cure for osteoarthritis.  We manage the symptoms over time.  We discussed treatment options including physical therapy, intra-articular corticosteroid injection as well as surgical management.  We think it is best to start with rehabilitation to work on a gentle capsular stretching program as well as a strengthening program.  I also suggested an intra-articular corticosteroid injection and is interested in this.      PLAN:   1.  The patient will be given a referral to start physical therapy.   2.  We will make [facility] an appointment for an ultrasound-guided intra-articular corticosteroid injection.   3.  He will follow up with me in 3-4 months for a routine recheck.         CHACHO MCKEON MD             D: 2020   T: 2020   MT: jessica      Name:     LATA MOTLEY   MRN:      [id number]        Account:      [id number]   :      1970           Service Date: 2020      Document: [id number]

## 2021-04-11 ENCOUNTER — HOSPITAL ENCOUNTER (EMERGENCY)
Facility: CLINIC | Age: 51
Discharge: HOME | End: 2021-04-12
Attending: EMERGENCY MEDICINE | Admitting: EMERGENCY MEDICINE
Payer: COMMERCIAL

## 2021-04-11 DIAGNOSIS — R20.2 PARESTHESIAS: ICD-10-CM

## 2021-04-11 DIAGNOSIS — Z11.52 ENCOUNTER FOR SCREENING LABORATORY TESTING FOR SEVERE ACUTE RESPIRATORY SYNDROME CORONAVIRUS 2 (SARS-COV-2): ICD-10-CM

## 2021-04-11 DIAGNOSIS — R06.00 DYSPNEA, UNSPECIFIED TYPE: ICD-10-CM

## 2021-04-11 LAB
ALBUMIN SERPL-MCNC: 4.1 G/DL (ref 3.4–5)
ALP SERPL-CCNC: 102 U/L (ref 40–150)
ALT SERPL W P-5'-P-CCNC: 84 U/L (ref 0–70)
ANION GAP SERPL CALCULATED.3IONS-SCNC: 12 MMOL/L (ref 3–14)
AST SERPL W P-5'-P-CCNC: 78 U/L (ref 0–45)
BASOPHILS # BLD AUTO: 0.1 10E9/L (ref 0–0.2)
BASOPHILS NFR BLD AUTO: 0.9 %
BILIRUB SERPL-MCNC: 0.4 MG/DL (ref 0.2–1.3)
BUN SERPL-MCNC: 17 MG/DL (ref 7–30)
CALCIUM SERPL-MCNC: 9.9 MG/DL (ref 8.5–10.1)
CHLORIDE SERPL-SCNC: 103 MMOL/L (ref 94–109)
CO2 SERPL-SCNC: 25 MMOL/L (ref 20–32)
CREAT SERPL-MCNC: 0.78 MG/DL (ref 0.66–1.25)
DIFFERENTIAL METHOD BLD: ABNORMAL
EOSINOPHIL # BLD AUTO: 0.4 10E9/L (ref 0–0.7)
EOSINOPHIL NFR BLD AUTO: 4.4 %
ERYTHROCYTE [DISTWIDTH] IN BLOOD BY AUTOMATED COUNT: 13 % (ref 10–15)
GFR SERPL CREATININE-BSD FRML MDRD: >90 ML/MIN/{1.73_M2}
GLUCOSE SERPL-MCNC: 129 MG/DL (ref 70–99)
HCT VFR BLD AUTO: 43.4 % (ref 40–53)
HGB BLD-MCNC: 15 G/DL (ref 13.3–17.7)
IMM GRANULOCYTES # BLD: 0 10E9/L (ref 0–0.4)
IMM GRANULOCYTES NFR BLD: 0.3 %
LYMPHOCYTES # BLD AUTO: 2.4 10E9/L (ref 0.8–5.3)
LYMPHOCYTES NFR BLD AUTO: 24.2 %
MCH RBC QN AUTO: 30.9 PG (ref 26.5–33)
MCHC RBC AUTO-ENTMCNC: 34.6 G/DL (ref 31.5–36.5)
MCV RBC AUTO: 90 FL (ref 78–100)
MONOCYTES # BLD AUTO: 0.5 10E9/L (ref 0–1.3)
MONOCYTES NFR BLD AUTO: 5.4 %
NEUTROPHILS # BLD AUTO: 6.4 10E9/L (ref 1.6–8.3)
NEUTROPHILS NFR BLD AUTO: 64.8 %
NRBC # BLD AUTO: 0 10*3/UL
NRBC BLD AUTO-RTO: 0 /100
PLATELET # BLD AUTO: 147 10E9/L (ref 150–450)
POTASSIUM SERPL-SCNC: 4 MMOL/L (ref 3.4–5.3)
PROT SERPL-MCNC: 7.6 G/DL (ref 6.8–8.8)
RBC # BLD AUTO: 4.85 10E12/L (ref 4.4–5.9)
SODIUM SERPL-SCNC: 140 MMOL/L (ref 133–144)
TROPONIN I SERPL-MCNC: <0.015 UG/L (ref 0–0.04)
WBC # BLD AUTO: 9.9 10E9/L (ref 4–11)

## 2021-04-11 PROCEDURE — 84484 ASSAY OF TROPONIN QUANT: CPT | Performed by: EMERGENCY MEDICINE

## 2021-04-11 PROCEDURE — 85379 FIBRIN DEGRADATION QUANT: CPT | Performed by: EMERGENCY MEDICINE

## 2021-04-11 PROCEDURE — 93005 ELECTROCARDIOGRAM TRACING: CPT

## 2021-04-11 PROCEDURE — 96361 HYDRATE IV INFUSION ADD-ON: CPT

## 2021-04-11 PROCEDURE — 99285 EMERGENCY DEPT VISIT HI MDM: CPT | Mod: 25 | Performed by: EMERGENCY MEDICINE

## 2021-04-11 PROCEDURE — 96360 HYDRATION IV INFUSION INIT: CPT

## 2021-04-11 PROCEDURE — 80053 COMPREHEN METABOLIC PANEL: CPT | Performed by: EMERGENCY MEDICINE

## 2021-04-11 PROCEDURE — C9803 HOPD COVID-19 SPEC COLLECT: HCPCS

## 2021-04-11 PROCEDURE — 99285 EMERGENCY DEPT VISIT HI MDM: CPT | Mod: 25

## 2021-04-11 PROCEDURE — 93010 ELECTROCARDIOGRAM REPORT: CPT | Performed by: EMERGENCY MEDICINE

## 2021-04-11 PROCEDURE — 85025 COMPLETE CBC W/AUTO DIFF WBC: CPT | Performed by: EMERGENCY MEDICINE

## 2021-04-11 PROCEDURE — 83735 ASSAY OF MAGNESIUM: CPT | Performed by: EMERGENCY MEDICINE

## 2021-04-11 ASSESSMENT — MIFFLIN-ST. JEOR: SCORE: 1752.57

## 2021-04-12 ENCOUNTER — APPOINTMENT (OUTPATIENT)
Dept: GENERAL RADIOLOGY | Facility: CLINIC | Age: 51
End: 2021-04-12
Attending: EMERGENCY MEDICINE
Payer: COMMERCIAL

## 2021-04-12 VITALS
WEIGHT: 200 LBS | HEART RATE: 86 BPM | HEIGHT: 69 IN | BODY MASS INDEX: 29.62 KG/M2 | SYSTOLIC BLOOD PRESSURE: 144 MMHG | DIASTOLIC BLOOD PRESSURE: 98 MMHG | TEMPERATURE: 98.6 F | RESPIRATION RATE: 16 BRPM | OXYGEN SATURATION: 100 %

## 2021-04-12 LAB
D DIMER PPP FEU-MCNC: 0.4 UG/ML FEU (ref 0–0.5)
FLUAV RNA RESP QL NAA+PROBE: NEGATIVE
FLUBV RNA RESP QL NAA+PROBE: NEGATIVE
INTERPRETATION ECG - MUSE: NORMAL
LABORATORY COMMENT REPORT: NORMAL
MAGNESIUM SERPL-MCNC: 1.7 MG/DL (ref 1.6–2.3)
RSV RNA SPEC QL NAA+PROBE: NEGATIVE
SARS-COV-2 RNA RESP QL NAA+PROBE: NEGATIVE
SPECIMEN SOURCE: NORMAL
TROPONIN I SERPL-MCNC: <0.015 UG/L (ref 0–0.04)

## 2021-04-12 PROCEDURE — 84484 ASSAY OF TROPONIN QUANT: CPT | Performed by: EMERGENCY MEDICINE

## 2021-04-12 PROCEDURE — 250N000013 HC RX MED GY IP 250 OP 250 PS 637: Performed by: EMERGENCY MEDICINE

## 2021-04-12 PROCEDURE — 258N000003 HC RX IP 258 OP 636: Performed by: EMERGENCY MEDICINE

## 2021-04-12 PROCEDURE — 71045 X-RAY EXAM CHEST 1 VIEW: CPT

## 2021-04-12 PROCEDURE — 71045 X-RAY EXAM CHEST 1 VIEW: CPT | Mod: 26 | Performed by: RADIOLOGY

## 2021-04-12 PROCEDURE — 87636 SARSCOV2 & INF A&B AMP PRB: CPT | Performed by: EMERGENCY MEDICINE

## 2021-04-12 RX ORDER — ASPIRIN 81 MG/1
324 TABLET, CHEWABLE ORAL ONCE
Status: COMPLETED | OUTPATIENT
Start: 2021-04-12 | End: 2021-04-12

## 2021-04-12 RX ADMIN — ASPIRIN 81 MG CHEWABLE TABLET 324 MG: 81 TABLET CHEWABLE at 01:42

## 2021-04-12 RX ADMIN — SODIUM CHLORIDE 1000 ML: 9 INJECTION, SOLUTION INTRAVENOUS at 01:34

## 2021-04-12 NOTE — ED TRIAGE NOTES
Pt. Presents to ED with vague complaints of numbness in his bilateral upper extremities that comes and goes for 3 years now. Reports a history of alcohol withdrawal with seizures and reports last drink Thursday. Was seen @ [facility] 2 days. Reports some chest tightness and SOB. A & O x 4, independent, tachycardic and hypertensive, OVSS on RA.

## 2021-04-12 NOTE — ED PROVIDER NOTES
ED Provider Note  [facility]      History     Chief Complaint   Patient presents with     Numbness     Chest Pain     The history is provided by the patient and medical records.     Anirudh Motley is a 50 year old male, chronic alcoholic, presents to the ER just not feeling quite right tonight. He states that at around 8 or 9 PM, approximately 4-5 hours prior to me seeing him he had a wave of heat go from the left side of his head, across the right side of his head, down his left arm. He described it as numbness and then later described it as a wave of heat. It passed, though a little while later, while on the light rail he felt it again. He states that he has had a history of seizures in the past. He states that he was on Keppra for a while, but subsequently he was told that more likely his seizures are alcohol withdrawal related and the answer is for him to stop drinking alcohol. He did have a one week binge of heavy drinking last week, his last alcohol use was on probably Thursday, three days ago. He states he does not feel as though he is having any withdrawal symptoms. He also states that tonight he has noticed that he seems like he cannot take a full breath. He is not necessarily feeling short of breath, just feels like he cannot take a full breath. No notable cough different from his baseline. No chest discomfort, abdominal pain, nausea, vomiting. He states he has had some mild diarrhea for about a week, one to two loose stools per day. This has been non bloody. No fevers. No reported pain or swelling in his legs. He does state that his sense of taste is somewhat dulled but states that he has noticed it for at least a couple weeks. No known history of DVT or PE. He does not have any personal history of cardiac disease, but he does have a family history of MIs. He states he has been asked multiple times if he has high blood pressure, his blood pressure has been high on multiple checks  "but he has never been told to take medication. No history of hyperlipidemia, diabetes. He is a smoker. He additionally states that he has had intermittent numbness in his bilateral feet for quite some time. He also notes that he has had intermittent numbness in his hands, particularly at night or if he rests in one position for too long or quite some time.     This part of the medical record was transcribed by Lindsey Delarosa Medical Scribe, from a dictation done by Chelsey Lunsford MD.       Past Medical History  Past Medical History:   Diagnosis Date     Alcohol dependence (H)     lives in wet house.  Per family has had seizures associated with EtOH in past     Anxiety      Depression      Diverticulosis      Seizures (H)      Substance abuse (H)      Past Surgical History:   Procedure Laterality Date     wisdom teeth extraction       No current outpatient medications on file.    Allergies   Allergen Reactions     Latex      Penicillins Hives and Rash     Family History  Family History   Problem Relation Age of Onset     No Known Problems Mother      No Known Problems Father      No Known Problems Sister      No Known Problems Brother      Social History   Social History     Tobacco Use     Smoking status: Never Smoker     Smokeless tobacco: Former User     Types: Chew     Tobacco comment: does not vape    Substance Use Topics     Alcohol use: Yes     Comment: Last drink 4/7     Drug use: No      Past medical history, past surgical history, medications, allergies, family history, and social history were reviewed with the patient. No additional pertinent items.       Review of Systems  A complete review of systems was performed with pertinent positives and negatives noted in the HPI, and all other systems negative.    Physical Exam   BP: (!) 162/94  Pulse: 121  Temp: 98.3  F (36.8  C)  Resp: 16  Height: 175.3 cm (5' 9\")  Weight: 90.7 kg (200 lb)  SpO2: 95 %  Physical Exam  Constitutional:       General: He is " not in acute distress.     Appearance: He is not diaphoretic.   HENT:      Head: Atraumatic.   Eyes:      General: No scleral icterus.     Pupils: Pupils are equal, round, and reactive to light.   Cardiovascular:      Heart sounds: Normal heart sounds.   Pulmonary:      Effort: No respiratory distress.      Breath sounds: Normal breath sounds.   Abdominal:      Palpations: Abdomen is soft.      Tenderness: There is no abdominal tenderness.   Musculoskeletal:         General: No tenderness.   Skin:     General: Skin is warm.      Findings: No rash.   Neurological:      General: No focal deficit present.      Mental Status: He is oriented to person, place, and time.      Cranial Nerves: No cranial nerve deficit.      Sensory: No sensory deficit.      Motor: No weakness.      Coordination: Coordination normal.         ED Course      Procedures             EKG Interpretation:      Interpreted by Chelsey Lunsford MD  Time reviewed: 0000  Symptoms at time of EKG: difficulty taking full breath   Rhythm: sinus tachycardia  Rate: 118  Axis: Normal  Ectopy: none  Conduction: normal  ST Segments/ T Waves: Poor R wave progression and TWI V#  Q Waves: none  Comparison to prior: new TWI V3 otherwise stable    Clinical Impression: Sinus tach, poor R wave progression, TWI V3                   Results for orders placed or performed during the hospital encounter of 04/11/21   XR Chest Port 1 View     Status: None    Narrative    EXAM: XR CHEST PORT 1 VW  LOCATION: [facility]  DATE/TIME: 4/12/2021 12:00 AM    INDICATION: Shortness of breath and chest pain  COMPARISON: None.      Impression    IMPRESSION: Negative chest.   CBC with platelets differential     Status: Abnormal   Result Value Ref Range    WBC 9.9 4.0 - 11.0 10e9/L    RBC Count 4.85 4.4 - 5.9 10e12/L    Hemoglobin 15.0 13.3 - 17.7 g/dL    Hematocrit 43.4 40.0 - 53.0 %    MCV 90 78 - 100 fl    MCH 30.9 26.5 - 33.0 pg    MCHC 34.6 31.5 - 36.5 g/dL    RDW  13.0 10.0 - 15.0 %    Platelet Count 147 (L) 150 - 450 10e9/L    Diff Method Automated Method     % Neutrophils 64.8 %    % Lymphocytes 24.2 %    % Monocytes 5.4 %    % Eosinophils 4.4 %    % Basophils 0.9 %    % Immature Granulocytes 0.3 %    Nucleated RBCs 0 0 /100    Absolute Neutrophil 6.4 1.6 - 8.3 10e9/L    Absolute Lymphocytes 2.4 0.8 - 5.3 10e9/L    Absolute Monocytes 0.5 0.0 - 1.3 10e9/L    Absolute Eosinophils 0.4 0.0 - 0.7 10e9/L    Absolute Basophils 0.1 0.0 - 0.2 10e9/L    Abs Immature Granulocytes 0.0 0 - 0.4 10e9/L    Absolute Nucleated RBC 0.0    Comprehensive metabolic panel     Status: Abnormal   Result Value Ref Range    Sodium 140 133 - 144 mmol/L    Potassium 4.0 3.4 - 5.3 mmol/L    Chloride 103 94 - 109 mmol/L    Carbon Dioxide 25 20 - 32 mmol/L    Anion Gap 12 3 - 14 mmol/L    Glucose 129 (H) 70 - 99 mg/dL    Urea Nitrogen 17 7 - 30 mg/dL    Creatinine 0.78 0.66 - 1.25 mg/dL    GFR Estimate >90 >60 mL/min/[1.73_m2]    GFR Estimate If Black >90 >60 mL/min/[1.73_m2]    Calcium 9.9 8.5 - 10.1 mg/dL    Bilirubin Total 0.4 0.2 - 1.3 mg/dL    Albumin 4.1 3.4 - 5.0 g/dL    Protein Total 7.6 6.8 - 8.8 g/dL    Alkaline Phosphatase 102 40 - 150 U/L    ALT 84 (H) 0 - 70 U/L    AST 78 (H) 0 - 45 U/L   Troponin I     Status: None   Result Value Ref Range    Troponin I ES <0.015 0.000 - 0.045 ug/L   Magnesium     Status: None   Result Value Ref Range    Magnesium 1.7 1.6 - 2.3 mg/dL   D dimer quantitative     Status: None   Result Value Ref Range    D Dimer 0.4 0.0 - 0.50 ug/ml FEU   Symptomatic Influenza A/B & SARS-CoV2 (COVID-19) Virus PCR Multiplex     Status: None    Specimen: Nasopharyngeal   Result Value Ref Range    Flu A/B & SARS-COV-2 PCR Source Nasopharyngeal     SARS-CoV-2 PCR Result NEGATIVE     Influenza A PCR Negative NEG^Negative    Influenza B PCR Negative NEG^Negative    Respiratory Syncytial Virus PCR Negative NEG^Negative    Flu A/B & SARS-CoV-2 PCR Comment (Note)    Troponin I      Status: None   Result Value Ref Range    Troponin I ES <0.015 0.000 - 0.045 ug/L   EKG 12-lead, tracing only     Status: None (Preliminary result)   Result Value Ref Range    Interpretation ECG Click View Image link to view waveform and result      Medications   0.9% sodium chloride BOLUS (0 mLs Intravenous Stopped 4/12/21 0306)   aspirin (ASA) chewable tablet 324 mg (324 mg Oral Given 4/12/21 0142)        Assessments & Plan (with Medical Decision Making)   The patient symptoms are fairly vague.  He was primarily worried that he might have a seizure.  He was monitored here for several hours.  He not feel like he is in withdrawal, does not have any objective signs of being withdrawal.  He was initially tachycardic but this resolved on its own.  He did get a little fluid here.  I did check him for Covid and this is negative.  Basic labs are unremarkable.  EKG shows sinus tachycardia with a rate of 118, poor R wave progression, isolated T wave inversion V3.  Looks stable and compared with previous aside from the slight T wave inversion in V3 which is new, but the most recent EKG I have is several years old.  He has not had any chest pain.  His respiratory symptoms are very subtle, feeling as though he cannot quite catch a full breath.  Chest x-ray is unremarkable.  I did do a D-dimer which is negative.  I have low suspicion for PE and do not think he needs further testing for this given the negative D-dimer.  I did do a 4-hour troponin and it remains negative.  I do think this is very reassuring and do think he is safe for discharge but did recommend that he follow-up with primary care this week.  He is given referral information.  I do think is possible he is got carpal tunnel given his bilateral hand numbness particularly at night or when holding his hands still for a long time.  I did offer cock-up wrist splints but he declined.  Neuro exam is normal, and I do not think he needs any neuroimaging.  The patient does  feel very reassured.  Again, he is encouraged to follow-up, return with concerns.  He is encouraged to continue abstaining from alcohol.    Dictation Disclaimer: Some of this Note has been completed with voice-recognition dictation software. Although errors are generally corrected real-time, there is the potential for a rare error to be present in the completed chart.      I have reviewed the nursing notes. I have reviewed the findings, diagnosis, plan and need for follow up with the patient.    New Prescriptions    No medications on file       Final diagnoses:   Dyspnea, unspecified type   Paresthesias   I, Lindsey Delarosa, am serving as a trained medical scribe to document services personally performed by Chelsey Lunsford MD, based on the provider's statements to me.  I, Chelsey Lunsford MD, was physically present and have reviewed and verified the accuracy of this note documented by Lindsey Delarosa.      --  [facility] EMERGENCY DEPARTMENT  4/11/2021     Chelsey Lunsford MD  04/12/21 3167

## 2021-04-12 NOTE — DISCHARGE INSTRUCTIONS
The cause of your symptoms are unclear. Continue to abstain from alcohol. Follow up with a clinic doctor within a week to establish care. Return with concerns.     Please make an appointment to follow up with Primary Care - [facility] (phone: [phone]) or Primary Care - [facility] (phone: [phone]).

## 2021-05-05 ENCOUNTER — HOSPITAL ENCOUNTER (EMERGENCY)
Facility: CLINIC | Age: 51
Discharge: HOME | End: 2021-05-05
Attending: EMERGENCY MEDICINE | Admitting: EMERGENCY MEDICINE
Payer: COMMERCIAL

## 2021-05-05 VITALS
DIASTOLIC BLOOD PRESSURE: 84 MMHG | SYSTOLIC BLOOD PRESSURE: 136 MMHG | TEMPERATURE: 98 F | RESPIRATION RATE: 18 BRPM | HEART RATE: 101 BPM | OXYGEN SATURATION: 99 %

## 2021-05-05 DIAGNOSIS — F10.920 ALCOHOLIC INTOXICATION WITHOUT COMPLICATION (H): ICD-10-CM

## 2021-05-05 LAB
ALBUMIN SERPL-MCNC: 4.1 G/DL (ref 3.4–5)
ALCOHOL BREATH TEST: 0.17 (ref 0–0.01)
ALP SERPL-CCNC: 73 U/L (ref 40–150)
ALT SERPL W P-5'-P-CCNC: 58 U/L (ref 0–70)
ANION GAP SERPL CALCULATED.3IONS-SCNC: 10 MMOL/L (ref 3–14)
AST SERPL W P-5'-P-CCNC: 93 U/L (ref 0–45)
BASOPHILS # BLD AUTO: 0.1 10E9/L (ref 0–0.2)
BASOPHILS NFR BLD AUTO: 0.9 %
BILIRUB SERPL-MCNC: 1 MG/DL (ref 0.2–1.3)
BUN SERPL-MCNC: 18 MG/DL (ref 7–30)
CALCIUM SERPL-MCNC: 8.7 MG/DL (ref 8.5–10.1)
CHLORIDE SERPL-SCNC: 102 MMOL/L (ref 94–109)
CO2 SERPL-SCNC: 27 MMOL/L (ref 20–32)
CREAT SERPL-MCNC: 0.65 MG/DL (ref 0.66–1.25)
DIFFERENTIAL METHOD BLD: ABNORMAL
EOSINOPHIL # BLD AUTO: 0 10E9/L (ref 0–0.7)
EOSINOPHIL NFR BLD AUTO: 0.3 %
ERYTHROCYTE [DISTWIDTH] IN BLOOD BY AUTOMATED COUNT: 13.4 % (ref 10–15)
GFR SERPL CREATININE-BSD FRML MDRD: >90 ML/MIN/{1.73_M2}
GLUCOSE SERPL-MCNC: 101 MG/DL (ref 70–99)
HCT VFR BLD AUTO: 38.4 % (ref 40–53)
HGB BLD-MCNC: 13.5 G/DL (ref 13.3–17.7)
IMM GRANULOCYTES # BLD: 0 10E9/L (ref 0–0.4)
IMM GRANULOCYTES NFR BLD: 0.3 %
LABORATORY COMMENT REPORT: NORMAL
LIPASE SERPL-CCNC: 72 U/L (ref 73–393)
LYMPHOCYTES # BLD AUTO: 1.7 10E9/L (ref 0.8–5.3)
LYMPHOCYTES NFR BLD AUTO: 22.4 %
MCH RBC QN AUTO: 31 PG (ref 26.5–33)
MCHC RBC AUTO-ENTMCNC: 35.2 G/DL (ref 31.5–36.5)
MCV RBC AUTO: 88 FL (ref 78–100)
MONOCYTES # BLD AUTO: 0.5 10E9/L (ref 0–1.3)
MONOCYTES NFR BLD AUTO: 6.3 %
NEUTROPHILS # BLD AUTO: 5.4 10E9/L (ref 1.6–8.3)
NEUTROPHILS NFR BLD AUTO: 69.8 %
NRBC # BLD AUTO: 0 10*3/UL
NRBC BLD AUTO-RTO: 0 /100
PLATELET # BLD AUTO: 209 10E9/L (ref 150–450)
POTASSIUM SERPL-SCNC: 3.8 MMOL/L (ref 3.4–5.3)
PROT SERPL-MCNC: 7.1 G/DL (ref 6.8–8.8)
RBC # BLD AUTO: 4.36 10E12/L (ref 4.4–5.9)
SARS-COV-2 RNA RESP QL NAA+PROBE: NEGATIVE
SODIUM SERPL-SCNC: 139 MMOL/L (ref 133–144)
SPECIMEN SOURCE: NORMAL
WBC # BLD AUTO: 7.8 10E9/L (ref 4–11)

## 2021-05-05 PROCEDURE — 82075 ASSAY OF BREATH ETHANOL: CPT | Performed by: EMERGENCY MEDICINE

## 2021-05-05 PROCEDURE — 250N000013 HC RX MED GY IP 250 OP 250 PS 637: Performed by: EMERGENCY MEDICINE

## 2021-05-05 PROCEDURE — 99283 EMERGENCY DEPT VISIT LOW MDM: CPT | Performed by: EMERGENCY MEDICINE

## 2021-05-05 PROCEDURE — U0005 INFEC AGEN DETEC AMPLI PROBE: HCPCS | Performed by: EMERGENCY MEDICINE

## 2021-05-05 PROCEDURE — 85025 COMPLETE CBC W/AUTO DIFF WBC: CPT | Performed by: EMERGENCY MEDICINE

## 2021-05-05 PROCEDURE — C9803 HOPD COVID-19 SPEC COLLECT: HCPCS | Performed by: EMERGENCY MEDICINE

## 2021-05-05 PROCEDURE — U0003 INFECTIOUS AGENT DETECTION BY NUCLEIC ACID (DNA OR RNA); SEVERE ACUTE RESPIRATORY SYNDROME CORONAVIRUS 2 (SARS-COV-2) (CORONAVIRUS DISEASE [COVID-19]), AMPLIFIED PROBE TECHNIQUE, MAKING USE OF HIGH THROUGHPUT TECHNOLOGIES AS DESCRIBED BY CMS-2020-01-R: HCPCS | Performed by: EMERGENCY MEDICINE

## 2021-05-05 PROCEDURE — 83690 ASSAY OF LIPASE: CPT | Performed by: EMERGENCY MEDICINE

## 2021-05-05 PROCEDURE — 80053 COMPREHEN METABOLIC PANEL: CPT | Performed by: EMERGENCY MEDICINE

## 2021-05-05 PROCEDURE — 99282 EMERGENCY DEPT VISIT SF MDM: CPT | Performed by: EMERGENCY MEDICINE

## 2021-05-05 RX ORDER — MULTIPLE VITAMINS W/ MINERALS TAB 9MG-400MCG
1 TAB ORAL DAILY
Status: DISCONTINUED | OUTPATIENT
Start: 2021-05-05 | End: 2021-05-05 | Stop reason: HOSPADM

## 2021-05-05 RX ORDER — DIAZEPAM 5 MG
10 TABLET ORAL EVERY 30 MIN PRN
Status: DISCONTINUED | OUTPATIENT
Start: 2021-05-05 | End: 2021-05-05 | Stop reason: HOSPADM

## 2021-05-05 RX ORDER — LANOLIN ALCOHOL/MO/W.PET/CERES
100 CREAM (GRAM) TOPICAL DAILY
Status: DISCONTINUED | OUTPATIENT
Start: 2021-05-05 | End: 2021-05-05 | Stop reason: HOSPADM

## 2021-05-05 RX ORDER — FOLIC ACID 1 MG/1
1 TABLET ORAL DAILY
Status: DISCONTINUED | OUTPATIENT
Start: 2021-05-05 | End: 2021-05-05 | Stop reason: HOSPADM

## 2021-05-05 RX ADMIN — FOLIC ACID 1 MG: 1 TABLET ORAL at 08:29

## 2021-05-05 RX ADMIN — MULTIPLE VITAMINS W/ MINERALS TAB 1 TABLET: TAB at 08:29

## 2021-05-05 NOTE — ED PROVIDER NOTES
ED Provider Note  [facility]      History     Chief Complaint   Patient presents with     Seizures     Alcohol Problem     HPI  Anirudh AURELIO Motley is a 50 year old male who has a PMHx with etoh dependence presenting with etoh use. Patient states he has to keep drinking to prevent seizures. States he drinks a 'traveler' of vodka per day to prevent seizures. Last drink 4 hours ago. Denies other drug use. When asked about seizures, patient states he lies down and doesn't feel well. No LOC. No bowel/bladder incontinence. No fevers, chills, vomiting. Has had some nausea. States he walked 4 hours to get here. No dizziness or light headedness. No CP. Does have some mild epigastric pain. No hallucinations, SI/HI.    Past Medical History  Past Medical History:   Diagnosis Date     Alcohol dependence (H)     lives in wet house.  Per family has had seizures associated with EtOH in past     Anxiety      Depression      Diverticulosis      Seizures (H)      Substance abuse (H)      Past Surgical History:   Procedure Laterality Date     wisdom teeth extraction       No current outpatient medications on file.    Allergies   Allergen Reactions     Latex      Penicillins Hives and Rash     Family History  Family History   Problem Relation Age of Onset     No Known Problems Mother      No Known Problems Father      No Known Problems Sister      No Known Problems Brother      Social History   Social History     Tobacco Use     Smoking status: Never Smoker     Smokeless tobacco: Former User     Types: Chew     Tobacco comment: does not vape    Substance Use Topics     Alcohol use: Yes     Comment: Last drink 4/7     Drug use: No      Past medical history, past surgical history, medications, allergies, family history, and social history were reviewed with the patient. No additional pertinent items.       Review of Systems  A complete review of systems was performed with pertinent positives and negatives noted in the  HPI, and all other systems negative.    Physical Exam   BP: (!) 132/93  Pulse: 91  Temp: 98.6  F (37  C)  Resp: 18  SpO2: 98 %  Physical Exam  Physical Exam   Constitutional: oriented to person, place, and time. appears well-developed and well-nourished.   HENT:   Head: Normocephalic and atraumatic. No intraoral abrasions.  Neck: Normal range of motion. No nuchal rigidity.  Pulmonary/Chest: Effort normal. No respiratory distress.   Cardiac: No murmurs, rubs, gallops. RRR.  Abdominal: Abdomen soft, nontender, nondistended. No rebound tenderness.  MSK: Long bones without deformity or evidence of trauma  Neurological: alert and oriented to person, place, and time. Gait is stable.  No nystagmus.  No tongue fasciculations.  No clonus.  No focal defects.  Strength is symmetric in the upper extremities.  Pupils are equal reactive to light, 3 mm bilaterally.  Skin: Skin is warm and dry.   Psychiatric:  normal mood and affect.  behavior is normal. Thought content normal.     ED Course      Procedures             Results for orders placed or performed during the hospital encounter of 05/05/21   Alcohol breath test POCT     Status: Abnormal   Result Value Ref Range    Alcohol Breath Test 0.172 (A) 0.00 - 0.01     Medications - No data to display     Assessments & Plan (with Medical Decision Making)   MDM  Patient presented alcohol tox occasion.  The patient thinks that he had a seizure.  He describes the event as feeling tingling in his face however it is unclear as this was not witnessed.  He did not lose consciousness.  He said that this happened right before coming here and he has no evidence of being postictal.  No incontinence.  No tongue biting.  He has had multiple visits to multiple emergency departments stating he has had seizures typically with leg symptoms, never witnessed seizure.  I feel he is at low risk for having an acute seizure as he is intoxicated either way.  There are beds available at [address], plan  for transport there when able.  They do need a Covid swab, when negative he can go to [address] [place] [address].    I have reviewed the nursing notes. I have reviewed the findings, diagnosis, plan and need for follow up with the patient.    New Prescriptions    No medications on file       Final diagnoses:   Alcoholic intoxication without complication (H)       --  Omkar GARCIA [facility] EMERGENCY DEPARTMENT  5/5/2021     Omkar Merlos MD  05/05/21 0600

## 2021-05-05 NOTE — ED TRIAGE NOTES
"Pt arrives to ED after saying that he was having seizures r/t ETOH. Pt has been binge drinking for 4 days. Last drink 4 hours ago. Walked here from [facility] and said he is ready to go to detox. Pt states that he was laying down and felt like \"shit aint good!\"  "

## 2021-05-05 NOTE — ED NOTES
"SIGN-OUT:  - Assumed care of this patient from Dr. Merlos  - Pending at shift change: Covid test  - Tentative plan: Admit to [address] if Covid test is negative    REASSESSMENT:    Covid test came back negative, patient however stated he did not want a wait any longer to go to [address] and he would \"do it on my own\".  Discharge was written for patient but he left before being formally discharged.         Charlie Rider MD  05/05/21 1007    "

## 2021-05-05 NOTE — DISCHARGE INSTRUCTIONS
DISCHARGE RESOURCES:  -[facility] [phone] (detox), [phone] (outpatient & [facility])    -[facility] - self management for addiction recovery:  www.smartrecovery.org    -[facility] ~ [facility] Support [facility]: 665.355.6331.  -[facility] [phone]   -Substance Abuse and Mental Health Services (www.samhsa.gov)  -Harm Reduction Coalition (www. Harmreduction.org)  -[place] Opioid Prevention Coalition: www.opioidcoalition.org  -Poison control 8-516-537-4783       Sober Support Group Information:  AA/NA & Sponsor/Support  -Alcoholics Anonymous (www.alcoholics-anonymous.org): for local information 24 hours/day  -AA Intergroup service office in [place] (http://www.aastpaul.org/) [phone]  -AA Intergroup service office in [facility]: [phone]. (http://www.aaminneaHuayiis.org/)  -Narcotics Anonymous (www.nam[place].org) (130) 323-1571   -Sober Fun Activities: www.soberTelepathyactivities.Webmedx/[facility]//[facility] Recovery Connection (MRC)  [facility] connects people seeking recovery to resources that help foster and sustain long-term recovery.  Whether you are seeking resources for treatment, transportation, housing, job training, education, health care or other pathways to recovery, [facility] is a great place to start.   Phone: [phone]. www.minnes[place]Hotelcloud.[address] [address] (Great listing of all types of recovery and non-recovery related resources)    Please make an appointment to follow up with Your Primary Care Provider as soon as possible.    Cut back on alcohol consumption.    Return to the emergency department for any problems.

## 2021-05-31 VITALS — HEIGHT: 69 IN | BODY MASS INDEX: 31.38 KG/M2 | WEIGHT: 200 LBS | BODY MASS INDEX: 29.53 KG/M2

## 2021-06-07 ENCOUNTER — HOSPITAL ENCOUNTER (INPATIENT)
Facility: CLINIC | Age: 51
LOS: 3 days | Discharge: NURSING, REHAB OR LONG-TERM CARE | End: 2021-06-11
Attending: FAMILY MEDICINE | Admitting: PSYCHIATRY & NEUROLOGY
Payer: COMMERCIAL

## 2021-06-07 DIAGNOSIS — F10.939 ALCOHOL WITHDRAWAL SEIZURE WITH COMPLICATION (H): ICD-10-CM

## 2021-06-07 DIAGNOSIS — F10.229 ALCOHOL DEPENDENCE WITH INTOXICATION WITH COMPLICATION (H): ICD-10-CM

## 2021-06-07 DIAGNOSIS — R56.9 ALCOHOL WITHDRAWAL SEIZURE WITH COMPLICATION (H): ICD-10-CM

## 2021-06-07 DIAGNOSIS — Z20.822 CONTACT WITH AND (SUSPECTED) EXPOSURE TO COVID-19: ICD-10-CM

## 2021-06-07 LAB
ALBUMIN SERPL-MCNC: 4.4 G/DL (ref 3.4–5)
ALCOHOL BREATH TEST: 0.25 (ref 0–0.01)
ALP SERPL-CCNC: 86 U/L (ref 40–150)
ALT SERPL W P-5'-P-CCNC: 41 U/L (ref 0–70)
ANION GAP SERPL CALCULATED.3IONS-SCNC: 14 MMOL/L (ref 3–14)
AST SERPL W P-5'-P-CCNC: 30 U/L (ref 0–45)
BASOPHILS # BLD AUTO: 0.1 10E9/L (ref 0–0.2)
BASOPHILS NFR BLD AUTO: 1.4 %
BILIRUB SERPL-MCNC: 1 MG/DL (ref 0.2–1.3)
BUN SERPL-MCNC: 13 MG/DL (ref 7–30)
CALCIUM SERPL-MCNC: 8.5 MG/DL (ref 8.5–10.1)
CHLORIDE SERPL-SCNC: 102 MMOL/L (ref 94–109)
CO2 SERPL-SCNC: 23 MMOL/L (ref 20–32)
CREAT SERPL-MCNC: 0.64 MG/DL (ref 0.66–1.25)
DIFFERENTIAL METHOD BLD: NORMAL
EOSINOPHIL # BLD AUTO: 0.2 10E9/L (ref 0–0.7)
EOSINOPHIL NFR BLD AUTO: 2.5 %
ERYTHROCYTE [DISTWIDTH] IN BLOOD BY AUTOMATED COUNT: 12.8 % (ref 10–15)
GFR SERPL CREATININE-BSD FRML MDRD: >90 ML/MIN/{1.73_M2}
GLUCOSE SERPL-MCNC: 88 MG/DL (ref 70–99)
HCT VFR BLD AUTO: 44.9 % (ref 40–53)
HGB BLD-MCNC: 16 G/DL (ref 13.3–17.7)
IMM GRANULOCYTES # BLD: 0 10E9/L (ref 0–0.4)
IMM GRANULOCYTES NFR BLD: 0.2 %
LABORATORY COMMENT REPORT: NORMAL
LIPASE SERPL-CCNC: 84 U/L (ref 73–393)
LYMPHOCYTES # BLD AUTO: 2.7 10E9/L (ref 0.8–5.3)
LYMPHOCYTES NFR BLD AUTO: 29.2 %
MCH RBC QN AUTO: 31.4 PG (ref 26.5–33)
MCHC RBC AUTO-ENTMCNC: 35.6 G/DL (ref 31.5–36.5)
MCV RBC AUTO: 88 FL (ref 78–100)
MONOCYTES # BLD AUTO: 0.6 10E9/L (ref 0–1.3)
MONOCYTES NFR BLD AUTO: 5.9 %
NEUTROPHILS # BLD AUTO: 5.7 10E9/L (ref 1.6–8.3)
NEUTROPHILS NFR BLD AUTO: 60.8 %
NRBC # BLD AUTO: 0 10*3/UL
NRBC BLD AUTO-RTO: 0 /100
PLATELET # BLD AUTO: 230 10E9/L (ref 150–450)
POTASSIUM SERPL-SCNC: 3.4 MMOL/L (ref 3.4–5.3)
PROT SERPL-MCNC: 8.4 G/DL (ref 6.8–8.8)
RBC # BLD AUTO: 5.09 10E12/L (ref 4.4–5.9)
SARS-COV-2 RNA RESP QL NAA+PROBE: NEGATIVE
SODIUM SERPL-SCNC: 139 MMOL/L (ref 133–144)
SPECIMEN SOURCE: NORMAL
WBC # BLD AUTO: 9.3 10E9/L (ref 4–11)

## 2021-06-07 PROCEDURE — 250N000013 HC RX MED GY IP 250 OP 250 PS 637: Performed by: EMERGENCY MEDICINE

## 2021-06-07 PROCEDURE — 258N000001 HC RX 258: Performed by: FAMILY MEDICINE

## 2021-06-07 PROCEDURE — 80053 COMPREHEN METABOLIC PANEL: CPT | Performed by: FAMILY MEDICINE

## 2021-06-07 PROCEDURE — 82075 ASSAY OF BREATH ETHANOL: CPT | Performed by: FAMILY MEDICINE

## 2021-06-07 PROCEDURE — 96361 HYDRATE IV INFUSION ADD-ON: CPT | Performed by: FAMILY MEDICINE

## 2021-06-07 PROCEDURE — 87635 SARS-COV-2 COVID-19 AMP PRB: CPT | Performed by: FAMILY MEDICINE

## 2021-06-07 PROCEDURE — 83690 ASSAY OF LIPASE: CPT | Performed by: FAMILY MEDICINE

## 2021-06-07 PROCEDURE — C9803 HOPD COVID-19 SPEC COLLECT: HCPCS | Performed by: FAMILY MEDICINE

## 2021-06-07 PROCEDURE — 96374 THER/PROPH/DIAG INJ IV PUSH: CPT | Performed by: FAMILY MEDICINE

## 2021-06-07 PROCEDURE — 82977 ASSAY OF GGT: CPT | Performed by: FAMILY MEDICINE

## 2021-06-07 PROCEDURE — 250N000009 HC RX 250: Performed by: FAMILY MEDICINE

## 2021-06-07 PROCEDURE — 82607 VITAMIN B-12: CPT | Performed by: FAMILY MEDICINE

## 2021-06-07 PROCEDURE — 84443 ASSAY THYROID STIM HORMONE: CPT | Performed by: FAMILY MEDICINE

## 2021-06-07 PROCEDURE — 250N000011 HC RX IP 250 OP 636: Performed by: FAMILY MEDICINE

## 2021-06-07 PROCEDURE — 85025 COMPLETE CBC W/AUTO DIFF WBC: CPT | Performed by: FAMILY MEDICINE

## 2021-06-07 PROCEDURE — 250N000013 HC RX MED GY IP 250 OP 250 PS 637: Performed by: FAMILY MEDICINE

## 2021-06-07 PROCEDURE — 99285 EMERGENCY DEPT VISIT HI MDM: CPT | Performed by: FAMILY MEDICINE

## 2021-06-07 PROCEDURE — 99285 EMERGENCY DEPT VISIT HI MDM: CPT | Mod: 25 | Performed by: FAMILY MEDICINE

## 2021-06-07 RX ORDER — MULTIVITAMIN,THERAPEUTIC
1 TABLET ORAL DAILY
COMMUNITY

## 2021-06-07 RX ORDER — HYDROXYZINE HYDROCHLORIDE 25 MG/1
50 TABLET, FILM COATED ORAL ONCE
Status: COMPLETED | OUTPATIENT
Start: 2021-06-07 | End: 2021-06-07

## 2021-06-07 RX ORDER — ONDANSETRON 2 MG/ML
4 INJECTION INTRAMUSCULAR; INTRAVENOUS ONCE
Status: COMPLETED | OUTPATIENT
Start: 2021-06-07 | End: 2021-06-07

## 2021-06-07 RX ORDER — OMEGA-3 FATTY ACIDS/FISH OIL 300-1000MG
400 CAPSULE ORAL EVERY 4 HOURS PRN
Status: ON HOLD | COMMUNITY
End: 2021-06-09

## 2021-06-07 RX ORDER — DIAZEPAM 5 MG
5 TABLET ORAL ONCE
Status: COMPLETED | OUTPATIENT
Start: 2021-06-07 | End: 2021-06-07

## 2021-06-07 RX ORDER — DIAZEPAM 5 MG
5-20 TABLET ORAL EVERY 30 MIN PRN
Status: DISCONTINUED | OUTPATIENT
Start: 2021-06-07 | End: 2021-06-08

## 2021-06-07 RX ADMIN — DIAZEPAM 5 MG: 5 TABLET ORAL at 16:09

## 2021-06-07 RX ADMIN — DIAZEPAM 10 MG: 5 TABLET ORAL at 21:53

## 2021-06-07 RX ADMIN — DIAZEPAM 5 MG: 5 TABLET ORAL at 20:08

## 2021-06-07 RX ADMIN — DIAZEPAM 5 MG: 5 TABLET ORAL at 18:29

## 2021-06-07 RX ADMIN — ONDANSETRON 4 MG: 2 INJECTION INTRAMUSCULAR; INTRAVENOUS at 16:09

## 2021-06-07 RX ADMIN — HYDROXYZINE HYDROCHLORIDE 50 MG: 25 TABLET, FILM COATED ORAL at 21:53

## 2021-06-07 RX ADMIN — FOLIC ACID: 5 INJECTION, SOLUTION INTRAMUSCULAR; INTRAVENOUS; SUBCUTANEOUS at 14:30

## 2021-06-07 ASSESSMENT — LIFESTYLE VARIABLES: INTOXICATION: 1

## 2021-06-07 NOTE — PHARMACY-ADMISSION MEDICATION HISTORY
Admission Medication History Completed by Pharmacy    See [facility] Admission Navigator for allergy information, preferred outpatient pharmacy, prior to admission medications and immunization status.     Medication History Sources:     Patient    Changes made to PTA medication list (reason):    Added:     Multivitamin (per patient)    Deleted: None    Changed: None    Additional Information:    Patient denies currently taking any prescribed medications.    Prior to Admission medications    Medication Sig Last Dose Taking? Auth Provider   ibuprofen (ADVIL/MOTRIN) 200 MG capsule Take 400 mg by mouth every 4 hours as needed for fever Past Month Yes Reported, Patient   multivitamin, therapeutic (THERA-VIT) TABS tablet Take 1 tablet by mouth daily Past Week Yes Unknown, Entered By History       Date completed: 06/07/21    Medication history completed by: Javier Tamez

## 2021-06-07 NOTE — ED PROVIDER NOTES
"    [facility] EMERGENCY DEPARTMENT ([facility])   June 7, 2021  Hallway 1 1:40 PM   History     Chief Complaint   Patient presents with     Alcohol Intoxication     Pt was found passed out in front of [employer] in [place]     Abdominal Pain     Pt told EMS that he has abd pain     The history is provided by the patient and medical records.   Alcohol Intoxication    Anirudh Motley is a 50 year old male with history of depression, anxiety, alcohol abuse, seizures who presents via EMS with alcohol intoxication. Patient was found passed out in front of [employer] [place]. He was brought here for further evaluation. Here patient is alert and awake. He states he has been drinking \"for a while.\" He states he binge drinks. He notes being in treatment at the [facility], was sober x 28 days but relapsed immediately upon discharge. He states he has never been through [facility] before. When he binges he drinks 1L vodka. He denies any other substance abuse. He states he didn't know he had seizures until he had fallen on the floor one day and was maintained on Keppra x 6 years. He was told not to drink and was taken off Keppra, not currently on anti-epileptic drugs . No history of DTs. He denies any other medical issues. He has not had a COVID-19 vaccination or prior COVID-19 infections.       PAST MEDICAL HISTORY:   Past Medical History:   Diagnosis Date     Alcohol dependence (H)     lives in wet house.  Per family has had seizures associated with EtOH in past     Anxiety      Depression      Diverticulosis      Seizures (H)      Substance abuse (H)        PAST SURGICAL HISTORY:   Past Surgical History:   Procedure Laterality Date     wisdom teeth extraction         Past medical history, past surgical history, medications, and allergies were reviewed with the patient. Additional pertinent items: None    FAMILY HISTORY:   Family History   Problem Relation Age of Onset     No Known Problems Mother      No Known Problems Father      No " Known Problems Sister      No Known Problems Brother        SOCIAL HISTORY:   Social History     Tobacco Use     Smoking status: Current Some Day Smoker     Packs/day: 0.00     Smokeless tobacco: Former User     Types: Chew     Quit date: 2004     Tobacco comment: does not vape    Substance Use Topics     Alcohol use: Yes     Social history was reviewed with the patient. Additional pertinent items: None      Patient's Medications   New Prescriptions    No medications on file   Previous Medications    IBUPROFEN (ADVIL/MOTRIN) 200 MG CAPSULE    Take 400 mg by mouth every 4 hours as needed for fever   Modified Medications    No medications on file   Discontinued Medications    No medications on file          Allergies   Allergen Reactions     Latex      Pcn [Penicillins] Hives and Rash        Review of Systems  A complete review of systems was performed with pertinent positives and negatives noted in the HPI, and all other systems negative.    Physical Exam   BP: 126/81  Pulse: 105  Temp: 98.5  F (36.9  C)  Resp: 16  SpO2: 95 %      Physical Exam  Constitutional:       General: He is not in acute distress.     Appearance: He is not diaphoretic.   HENT:      Head: Atraumatic.   Eyes:      General: No scleral icterus.     Pupils: Pupils are equal, round, and reactive to light.   Cardiovascular:      Heart sounds: Normal heart sounds.   Pulmonary:      Effort: No respiratory distress.      Breath sounds: Normal breath sounds.   Abdominal:      General: Bowel sounds are normal.      Palpations: Abdomen is soft.      Tenderness: There is generalized abdominal tenderness. There is no guarding or rebound.   Musculoskeletal:         General: No tenderness.   Skin:     General: Skin is warm.      Findings: No rash.   Neurological:      General: No focal deficit present.      Mental Status: He is oriented to person, place, and time.      Cranial Nerves: No cranial nerve deficit.      Motor: No weakness.      Coordination:  Coordination normal.   Psychiatric:         Mood and Affect: Mood normal.         Speech: Speech is slurred.         Behavior: Behavior is cooperative.         Thought Content: Thought content does not include homicidal or suicidal ideation.         ED Course        Procedures       The medical record was reviewed and interpreted.  Current labs reviewed and interpreted.         Results for orders placed or performed during the hospital encounter of 06/07/21   CBC with platelets differential     Status: None   Result Value Ref Range    WBC 9.3 4.0 - 11.0 10e9/L    RBC Count 5.09 4.4 - 5.9 10e12/L    Hemoglobin 16.0 13.3 - 17.7 g/dL    Hematocrit 44.9 40.0 - 53.0 %    MCV 88 78 - 100 fl    MCH 31.4 26.5 - 33.0 pg    MCHC 35.6 31.5 - 36.5 g/dL    RDW 12.8 10.0 - 15.0 %    Platelet Count 230 150 - 450 10e9/L    Diff Method Automated Method     % Neutrophils 60.8 %    % Lymphocytes 29.2 %    % Monocytes 5.9 %    % Eosinophils 2.5 %    % Basophils 1.4 %    % Immature Granulocytes 0.2 %    Nucleated RBCs 0 0 /100    Absolute Neutrophil 5.7 1.6 - 8.3 10e9/L    Absolute Lymphocytes 2.7 0.8 - 5.3 10e9/L    Absolute Monocytes 0.6 0.0 - 1.3 10e9/L    Absolute Eosinophils 0.2 0.0 - 0.7 10e9/L    Absolute Basophils 0.1 0.0 - 0.2 10e9/L    Abs Immature Granulocytes 0.0 0 - 0.4 10e9/L    Absolute Nucleated RBC 0.0    Comprehensive metabolic panel     Status: Abnormal   Result Value Ref Range    Sodium 139 133 - 144 mmol/L    Potassium 3.4 3.4 - 5.3 mmol/L    Chloride 102 94 - 109 mmol/L    Carbon Dioxide 23 20 - 32 mmol/L    Anion Gap 14 3 - 14 mmol/L    Glucose 88 70 - 99 mg/dL    Urea Nitrogen 13 7 - 30 mg/dL    Creatinine 0.64 (L) 0.66 - 1.25 mg/dL    GFR Estimate >90 >60 mL/min/[1.73_m2]    GFR Estimate If Black >90 >60 mL/min/[1.73_m2]    Calcium 8.5 8.5 - 10.1 mg/dL    Bilirubin Total 1.0 0.2 - 1.3 mg/dL    Albumin 4.4 3.4 - 5.0 g/dL    Protein Total 8.4 6.8 - 8.8 g/dL    Alkaline Phosphatase 86 40 - 150 U/L    ALT 41 0 - 70  U/L    AST 30 0 - 45 U/L   Lipase     Status: None   Result Value Ref Range    Lipase 84 73 - 393 U/L   Alcohol breath test POCT     Status: Abnormal   Result Value Ref Range    Alcohol Breath Test 0.252 (A) 0.00 - 0.01       Assessments & Plan (with Medical Decision Making)         I have reviewed the nursing notes.    I have reviewed the findings, diagnosis, plan and need for follow up with the patient.    Patient with history of alcohol withdrawal seizures with history of chronic dependence now seeking detox will be admitted to station 3 a unfortunately beds are not immediately available he will remain in the emergency room on [facility] protocol until bed becomes available on station 3A.    Final diagnoses:   Alcohol dependence with intoxication with complication (H)   Alcohol withdrawal seizure with complication (H)       6/7/2021   [facility] EMERGENCY DEPARTMENT     Conrad Ferrer MD  06/07/21 1594

## 2021-06-07 NOTE — ED NOTES
Bed: HW01  Expected date:   Expected time:   Means of arrival:   Comments:  50yrs old M, ETOH intoxication; [facility]

## 2021-06-08 ENCOUNTER — APPOINTMENT (OUTPATIENT)
Dept: CT IMAGING | Facility: CLINIC | Age: 51
End: 2021-06-08
Attending: INTERNAL MEDICINE
Payer: COMMERCIAL

## 2021-06-08 ENCOUNTER — APPOINTMENT (OUTPATIENT)
Dept: GENERAL RADIOLOGY | Facility: CLINIC | Age: 51
End: 2021-06-08
Attending: PHYSICIAN ASSISTANT
Payer: COMMERCIAL

## 2021-06-08 PROBLEM — R56.9 ALCOHOL WITHDRAWAL SEIZURE WITH COMPLICATION (H): Status: ACTIVE | Noted: 2021-06-08

## 2021-06-08 PROBLEM — F10.939 ALCOHOL WITHDRAWAL SEIZURE WITH COMPLICATION (H): Status: ACTIVE | Noted: 2021-06-08

## 2021-06-08 PROBLEM — F10.229 ALCOHOL DEPENDENCE WITH INTOXICATION WITH COMPLICATION (H): Status: ACTIVE | Noted: 2021-06-08

## 2021-06-08 LAB
GGT SERPL-CCNC: 47 U/L (ref 0–75)
HGB BLD-MCNC: 14 G/DL (ref 13.3–17.7)
MAGNESIUM SERPL-MCNC: 1.8 MG/DL (ref 1.6–2.3)
TROPONIN I SERPL-MCNC: <0.015 UG/L (ref 0–0.04)
TSH SERPL DL<=0.005 MIU/L-ACNC: 0.85 MU/L (ref 0.4–4)
VIT B12 SERPL-MCNC: 669 PG/ML (ref 193–986)

## 2021-06-08 PROCEDURE — 93010 ELECTROCARDIOGRAM REPORT: CPT | Performed by: INTERNAL MEDICINE

## 2021-06-08 PROCEDURE — 84484 ASSAY OF TROPONIN QUANT: CPT | Performed by: PHYSICIAN ASSISTANT

## 2021-06-08 PROCEDURE — 250N000013 HC RX MED GY IP 250 OP 250 PS 637: Performed by: PSYCHIATRY & NEUROLOGY

## 2021-06-08 PROCEDURE — 250N000011 HC RX IP 250 OP 636: Performed by: PSYCHIATRY & NEUROLOGY

## 2021-06-08 PROCEDURE — 71046 X-RAY EXAM CHEST 2 VIEWS: CPT | Mod: 26 | Performed by: RADIOLOGY

## 2021-06-08 PROCEDURE — 71275 CT ANGIOGRAPHY CHEST: CPT | Mod: 26 | Performed by: RADIOLOGY

## 2021-06-08 PROCEDURE — 99233 SBSQ HOSP IP/OBS HIGH 50: CPT | Performed by: PHYSICIAN ASSISTANT

## 2021-06-08 PROCEDURE — 128N000004 HC R&B CD ADULT

## 2021-06-08 PROCEDURE — 36415 COLL VENOUS BLD VENIPUNCTURE: CPT | Performed by: PHYSICIAN ASSISTANT

## 2021-06-08 PROCEDURE — 99207 PR CONSULT E&M CHANGED TO SUBSEQUENT LEVEL: CPT | Performed by: PHYSICIAN ASSISTANT

## 2021-06-08 PROCEDURE — 93005 ELECTROCARDIOGRAM TRACING: CPT

## 2021-06-08 PROCEDURE — 85018 HEMOGLOBIN: CPT | Performed by: PHYSICIAN ASSISTANT

## 2021-06-08 PROCEDURE — 71046 X-RAY EXAM CHEST 2 VIEWS: CPT

## 2021-06-08 PROCEDURE — 99222 1ST HOSP IP/OBS MODERATE 55: CPT | Mod: AI | Performed by: PSYCHIATRY & NEUROLOGY

## 2021-06-08 PROCEDURE — 83735 ASSAY OF MAGNESIUM: CPT | Performed by: PHYSICIAN ASSISTANT

## 2021-06-08 PROCEDURE — 99207 PR APP CREDIT; MD BILLING SHARED VISIT: CPT | Performed by: PHYSICIAN ASSISTANT

## 2021-06-08 PROCEDURE — 71275 CT ANGIOGRAPHY CHEST: CPT

## 2021-06-08 PROCEDURE — 99207 PR APP CREDIT; MD BILLING SHARED VISIT: CPT | Performed by: INTERNAL MEDICINE

## 2021-06-08 PROCEDURE — HZ2ZZZZ DETOXIFICATION SERVICES FOR SUBSTANCE ABUSE TREATMENT: ICD-10-PCS | Performed by: PSYCHIATRY & NEUROLOGY

## 2021-06-08 PROCEDURE — 250N000009 HC RX 250: Performed by: PSYCHIATRY & NEUROLOGY

## 2021-06-08 RX ORDER — TRAZODONE HYDROCHLORIDE 50 MG/1
50 TABLET, FILM COATED ORAL
Status: DISCONTINUED | OUTPATIENT
Start: 2021-06-08 | End: 2021-06-11 | Stop reason: HOSPADM

## 2021-06-08 RX ORDER — IOPAMIDOL 755 MG/ML
100 INJECTION, SOLUTION INTRAVASCULAR ONCE
Status: COMPLETED | OUTPATIENT
Start: 2021-06-08 | End: 2021-06-08

## 2021-06-08 RX ORDER — FOLIC ACID 1 MG/1
1 TABLET ORAL DAILY
Status: DISCONTINUED | OUTPATIENT
Start: 2021-06-08 | End: 2021-06-11 | Stop reason: HOSPADM

## 2021-06-08 RX ORDER — DIAZEPAM 5 MG
5-20 TABLET ORAL EVERY 30 MIN PRN
Status: DISCONTINUED | OUTPATIENT
Start: 2021-06-08 | End: 2021-06-11 | Stop reason: HOSPADM

## 2021-06-08 RX ORDER — MULTIPLE VITAMINS W/ MINERALS TAB 9MG-400MCG
1 TAB ORAL DAILY
Status: DISCONTINUED | OUTPATIENT
Start: 2021-06-08 | End: 2021-06-11 | Stop reason: HOSPADM

## 2021-06-08 RX ORDER — ATENOLOL 50 MG/1
50 TABLET ORAL DAILY PRN
Status: DISCONTINUED | OUTPATIENT
Start: 2021-06-08 | End: 2021-06-08

## 2021-06-08 RX ORDER — MAGNESIUM HYDROXIDE/ALUMINUM HYDROXICE/SIMETHICONE 120; 1200; 1200 MG/30ML; MG/30ML; MG/30ML
30 SUSPENSION ORAL EVERY 4 HOURS PRN
Status: DISCONTINUED | OUTPATIENT
Start: 2021-06-08 | End: 2021-06-11 | Stop reason: HOSPADM

## 2021-06-08 RX ORDER — IBUPROFEN 600 MG/1
600 TABLET, FILM COATED ORAL EVERY 4 HOURS PRN
Status: DISCONTINUED | OUTPATIENT
Start: 2021-06-08 | End: 2021-06-11 | Stop reason: HOSPADM

## 2021-06-08 RX ORDER — PSEUDOEPHEDRINE HCL 30 MG
30 TABLET ORAL EVERY 4 HOURS PRN
Status: DISCONTINUED | OUTPATIENT
Start: 2021-06-08 | End: 2021-06-11 | Stop reason: HOSPADM

## 2021-06-08 RX ORDER — HYDROXYZINE HYDROCHLORIDE 25 MG/1
25 TABLET, FILM COATED ORAL EVERY 4 HOURS PRN
Status: DISCONTINUED | OUTPATIENT
Start: 2021-06-08 | End: 2021-06-11 | Stop reason: HOSPADM

## 2021-06-08 RX ORDER — PSEUDOEPHEDRINE HCL 120 MG/1
120 TABLET, FILM COATED, EXTENDED RELEASE ORAL 2 TIMES DAILY
Status: DISCONTINUED | OUTPATIENT
Start: 2021-06-08 | End: 2021-06-08

## 2021-06-08 RX ORDER — ACETAMINOPHEN 325 MG/1
650 TABLET ORAL EVERY 4 HOURS PRN
Status: DISCONTINUED | OUTPATIENT
Start: 2021-06-08 | End: 2021-06-11 | Stop reason: HOSPADM

## 2021-06-08 RX ORDER — AMOXICILLIN 250 MG
1 CAPSULE ORAL 2 TIMES DAILY PRN
Status: DISCONTINUED | OUTPATIENT
Start: 2021-06-08 | End: 2021-06-11 | Stop reason: HOSPADM

## 2021-06-08 RX ORDER — OLANZAPINE 5 MG/1
5 TABLET, ORALLY DISINTEGRATING ORAL EVERY 4 HOURS PRN
Status: DISCONTINUED | OUTPATIENT
Start: 2021-06-08 | End: 2021-06-11 | Stop reason: HOSPADM

## 2021-06-08 RX ORDER — LANOLIN ALCOHOL/MO/W.PET/CERES
100 CREAM (GRAM) TOPICAL DAILY
Status: DISCONTINUED | OUTPATIENT
Start: 2021-06-08 | End: 2021-06-11 | Stop reason: HOSPADM

## 2021-06-08 RX ADMIN — HYDROXYZINE HYDROCHLORIDE 25 MG: 25 TABLET, FILM COATED ORAL at 15:18

## 2021-06-08 RX ADMIN — DIAZEPAM 10 MG: 5 TABLET ORAL at 16:15

## 2021-06-08 RX ADMIN — DIAZEPAM 10 MG: 5 TABLET ORAL at 02:18

## 2021-06-08 RX ADMIN — FOLIC ACID 1 MG: 1 TABLET ORAL at 07:56

## 2021-06-08 RX ADMIN — PSEUDOEPHEDRINE HCL 30 MG: 30 TABLET, FILM COATED ORAL at 19:29

## 2021-06-08 RX ADMIN — OLANZAPINE 5 MG: 5 TABLET, ORALLY DISINTEGRATING ORAL at 20:16

## 2021-06-08 RX ADMIN — SODIUM CHLORIDE 84 ML: 9 INJECTION, SOLUTION INTRAVENOUS at 22:48

## 2021-06-08 RX ADMIN — DIAZEPAM 10 MG: 5 TABLET ORAL at 20:39

## 2021-06-08 RX ADMIN — TRAZODONE HYDROCHLORIDE 50 MG: 50 TABLET ORAL at 02:19

## 2021-06-08 RX ADMIN — ACETAMINOPHEN 650 MG: 325 TABLET, FILM COATED ORAL at 02:29

## 2021-06-08 RX ADMIN — ACETAMINOPHEN 650 MG: 325 TABLET, FILM COATED ORAL at 16:19

## 2021-06-08 RX ADMIN — DIAZEPAM 10 MG: 5 TABLET ORAL at 04:32

## 2021-06-08 RX ADMIN — ACETAMINOPHEN 650 MG: 325 TABLET, FILM COATED ORAL at 20:18

## 2021-06-08 RX ADMIN — DIAZEPAM 10 MG: 5 TABLET ORAL at 18:22

## 2021-06-08 RX ADMIN — IBUPROFEN 600 MG: 600 TABLET, FILM COATED ORAL at 21:44

## 2021-06-08 RX ADMIN — ALUMINUM HYDROXIDE, MAGNESIUM HYDROXIDE, AND SIMETHICONE 30 ML: 200; 200; 20 SUSPENSION ORAL at 20:22

## 2021-06-08 RX ADMIN — IOPAMIDOL 64 ML: 755 INJECTION, SOLUTION INTRAVENOUS at 22:41

## 2021-06-08 RX ADMIN — ALUMINUM HYDROXIDE, MAGNESIUM HYDROXIDE, AND SIMETHICONE 30 ML: 200; 200; 20 SUSPENSION ORAL at 15:19

## 2021-06-08 RX ADMIN — THIAMINE HCL TAB 100 MG 100 MG: 100 TAB at 07:56

## 2021-06-08 RX ADMIN — HYDROXYZINE HYDROCHLORIDE 25 MG: 25 TABLET, FILM COATED ORAL at 04:32

## 2021-06-08 RX ADMIN — DIAZEPAM 10 MG: 5 TABLET ORAL at 07:56

## 2021-06-08 RX ADMIN — Medication 1 TABLET: at 07:56

## 2021-06-08 ASSESSMENT — ACTIVITIES OF DAILY LIVING (ADL)
LAUNDRY: WITH SUPERVISION
DRESS: INDEPENDENT
HYGIENE/GROOMING: INDEPENDENT
ORAL_HYGIENE: INDEPENDENT

## 2021-06-08 ASSESSMENT — MIFFLIN-ST. JEOR: SCORE: 1752.57

## 2021-06-08 NOTE — PROGRESS NOTES
Patient appeared to be asleep for 3 hours during safety checks this shift. MSSA scores were 9 and 10. Patient received prn Valium 10 mg x 2. A new dressing was applied to lower back by another RN. Will continue to monitor and update if there are changes.

## 2021-06-08 NOTE — ED NOTES
"ED to Behavioral Floor Handoff    SITUATION  Anirudh Motley is a 50 year old male who speaks English and lives in a home unknown The patient arrived in the ED by private car from home with a complaint of Alcohol Intoxication (Pt was found passed out in front of [employer] in [facility]) and Abdominal Pain (Pt told EMS that he has abd pain)  .The patient's current symptoms started/worsened 1 day(s) ago and during this time the symptoms have remained the same.   In the ED, pt was diagnosed with   Final diagnoses:   Alcohol dependence with intoxication with complication (H)   Alcohol withdrawal seizure with complication (H)      depression, anxiety, alcohol abuse, seizures who presents via EMS with alcohol intoxication. Patient was found passed out in front of [employer] [place]. He was brought here for further evaluation. Here patient is alert and awake. He states he has been drinking \"for a while.\" He states he binge drinks. He notes being in treatment at the [facility], was sober x 28 days but relapsed immediately upon discharge. He states he has never been through [facility] before. When he binges he drinks 1L vodka. He denies any other substance abuse. He states he didn't know he had seizures until he had fallen on the floor one day and was maintained on Keppra x 6 years. He was told not to drink and was taken off Keppra, not currently on anti-epileptic drugs . No history of DTs. He denies any other medical issues. He has not had a COVID-19 vaccination or prior COVID-19 infections.      Initial vitals were: BP: 126/81  Pulse: 105  Temp: 98.5  F (36.9  C)  Resp: 16  Weight: 90.7 kg (200 lb)  SpO2: 95 %   --------  Is the patient diabetic? No   If yes, last blood glucose? --     If yes, was this treated in the ED? --  --------  Is the patient inebriated (ETOH) Yes or Impaired on other substances? No  MSSA done? Yes  Last MSSA score: --    Were withdrawal symptoms treated? Yes  Does the patient have a seizure history? Yes. If yes, " date of most recent seizure-- 6 years ago  --------  Is the patient patient experiencing suicidal ideation? denies current or recent suicidal ideation     Homicidal ideation? denies current or recent homicidal ideation or behaviors.    Self-injurious behavior/urges? denies current or recent self injurious behavior or ideation.  ------  Was pt aggressive in the ED No  Was a code called No  Is the pt now cooperative? Yes  -------  Meds given in ED:   Medications   diazepam (VALIUM) tablet 5-20 mg (10 mg Oral Given 6/7/21 2153)   dextrose 5% and 0.45% NaCl 1,000 mL with Infuvite Adult 10 mL, thiamine 100 mg, folic acid 1 mg infusion ( Intravenous Stopped 6/7/21 4338)   ondansetron (ZOFRAN) injection 4 mg (4 mg Intravenous Given 6/7/21 1609)   diazepam (VALIUM) tablet 5 mg (5 mg Oral Given 6/7/21 1609)   hydrOXYzine (ATARAX) tablet 50 mg (50 mg Oral Given 6/7/21 2153)      Family present during ED course? No  Family currently present? No    BACKGROUND  Does the patient have a cognitive impairment or developmental disability? No  Allergies:   Allergies   Allergen Reactions     Latex      Pcn [Penicillins] Hives and Rash   .   Social demographics are   Social History     Socioeconomic History     Marital status: Single     Spouse name: None     Number of children: None     Years of education: None     Highest education level: None   Occupational History     None   Social Needs     Financial resource strain: None     Food insecurity     Worry: None     Inability: None     Transportation needs     Medical: None     Non-medical: None   Tobacco Use     Smoking status: Current Some Day Smoker     Packs/day: 0.00     Smokeless tobacco: Former User     Types: Chew     Quit date: 2004     Tobacco comment: does not vape    Substance and Sexual Activity     Alcohol use: Yes     Drug use: No     Sexual activity: Yes     Partners: Female   Lifestyle     Physical activity     Days per week: None     Minutes per session: None      Stress: None   Relationships     Social connections     Talks on phone: None     Gets together: None     Attends [employer] service: None     Active member of club or organization: None     Attends meetings of clubs or organizations: None     Relationship status: None     Intimate partner violence     Fear of current or ex partner: None     Emotionally abused: None     Physically abused: None     Forced sexual activity: None   Other Topics Concern     None   Social History Narrative     None        ASSESSMENT  Labs results   Labs Ordered and Resulted from Time of ED Arrival Up to the Time of Departure from the ED   COMPREHENSIVE METABOLIC PANEL - Abnormal; Notable for the following components:       Result Value    Creatinine 0.64 (*)     All other components within normal limits   ALCOHOL BREATH TEST POCT - Abnormal; Notable for the following components:    Alcohol Breath Test 0.252 (*)     All other components within normal limits   CBC WITH PLATELETS DIFFERENTIAL   LIPASE   SARS-COV-2 (COVID-19) VIRUS RT-PCR   DRUG ABUSE SCREEN 6 CHEM DEP URINE ([facility])   MSSA SCORE AND VS   NOTIFY      Imaging Studies: No results found for this or any previous visit (from the past 24 hour(s)).   Most recent vital signs /73   Pulse 79   Temp 98.5  F (36.9  C) (Oral)   Resp 14   Wt 90.7 kg (200 lb)   SpO2 97%   BMI 29.53 kg/m     Abnormal labs/tests/findings requiring intervention:---   Pain control: pt had none  Nausea control: good    RECOMMENDATION  Are any infection precautions needed (MRSA, VRE, etc.)? No If yes, what infection? --  ---  Does the patient have mobility issues? with stand-by assist. If yes, what device does the pt use? ---  ---  Is patient on 72 hour hold or commitment? No If on 72 hour hold, have hold and rights been given to patient? No  Are admitting orders written if after 10 p.m. ?No  Tasks needing to be completed:---     Lalita Mckeon, RN     [phone] [facility]

## 2021-06-08 NOTE — PROGRESS NOTES
Case Management Note  6/8/2021    Met with pt to discuss discharge planning. Pt reports he has done a lot of treatment, wants to work, get a car, etc. Pt reports he did [facility] for 28 days and immediately relapsed. Talked with pt about IOP w/ sober housing options, however pt reports he does not want to lose his housing if he relapses. Pt reports [facility] had set him up with [facility] ([phone]), he had already done all the registration and was supposed to show up yesterday but didn't make it. Pt would like to go back there if he is able. Pt signed release.     Called [facility], spoke with lead case manager Kevin. Kevin reports pt can still come, needs to be there at 8 AM on the day he arrives. This would likely not be feasible tomorrow as pt is in active withdrawal, but may be possible by Thursday or Friday. Will update Kevin 6/9/21 on which day pt will be ready to discharge. Updated pt. AVS updated.     Mary Askew, LPCC, LADC

## 2021-06-08 NOTE — PROGRESS NOTES
06/08/21 0133   Patient Belongings   Did you bring any home meds/supplements to the hospital?  No   Patient Belongings remains with patient;locker;sent to security per site process;returned to patient at discharge   Patient Belongings Remaining with Patient clothing   Patient Belongings Put in Hospital Secure Location (Security or Locker, etc.) other (see comments)   Belongings Search Yes   Clothing Search Yes   Second Staff Declan & Rashawn RN   Comment camo short & floral shirt remain with pt, everything else locked away   STORAGE BIN: mask, laced shoe, lighter, cigarette, socks, belt  MED BIN: none  SECURITY: $1 cash, mn ebt, mn id  NO PHONE. NO WALLET.  A               Admission:  I am responsible for any personal items that are not sent to the safe or pharmacy.  [facility] is not responsible for loss, theft or damage of any property in my possession.    Signature:  _________________________________ Date: _______  Time: _____                                              Staff Signature:  ____________________________ Date: ________  Time: _____      2nd Staff person, if patient is unable/unwilling to sign:    Signature: ________________________________ Date: ________  Time: _____     Discharge:  [facility] has returned all of my personal belongings:    Signature: _________________________________ Date: ________  Time: _____                                          Staff Signature:  ____________________________ Date: ________  Time: _____

## 2021-06-08 NOTE — CONSULTS
"    Internal Medicine Initial Visit      Anirudh Motley MRN# [id number]   YOB: 1970 Age: 50 year old   Date of Admission: 6/7/2021  PCP: Mario Armenta    Referring Provider: Behavioral Health - Darnell Stringer MD  Reason for Visit: General Medical Evaluation, EtOH abuse and withdrawal         Assessment and Recommendations:   Anirudh Motley is a 50 year old year old man with a history of EtOH abuse and withdrawal, withdrawal seizures, depression, anxiety who is admitted to station 3A with EtOH abuse and withdrawal.    # EtOH abuse and withdrawal  # MDD, Anxiety  Management per psychiatry team. Lab workup unremarkable. VSS.  - Agree with MSSA using valium  - Agree with thiamine, folic acid MVI  - Seizure precautions      Medicine will sign off, please page with any additional concerns.     Jennifer Sethi PA-C  Hospitalist Service   Pager:   [facility] Paging/Directory     Reason for Admission:   EtOH abuse and withdrawal         History of Present Illness:     Anirudh Motley is a 50 year old year old man with a history of EtOH abuse and withdrawal, withdrawal seizures, depression, anxiety who is admitted to station 3A with EtOH abuse and withdrawal.  Per ED note:  \"Patient was found passed out in front of [employer]. He was brought here for further evaluation. Here patient is alert and awake. He states he has been drinking \"for a while.\" He states he binge drinks. He notes being in treatment at the [facility], was sober x 28 days but relapsed immediately upon discharge. He states he has never been through [facility] before. When he binges he drinks 1L vodka. He denies any other substance abuse. He states he didn't know he had seizures until he had fallen on the floor one day and was maintained on Keppra x 6 years. He was told not to drink and was taken off Keppra, not currently on anti-epileptic drugs . No history of DTs. He denies any other medical issues. He has not had a COVID-19 vaccination or prior " "COVID-19 infections.\"  Internal Medicine service was asked to see patient for a general medication evaluation. Currently, patient complains of some mild epigastric abdominal pain that radiates up his sternum, which he states is common for him when he is detoxing. He also complains of back pain 2/2 a wound he obtained on his thoracic spine when he was \"in the woods\". The wound was covered with a piece of gauze. He also complains of some chest tightness associated with anxiety, and asks when he can get something for anxiety.   Denies any chest pain, shortness of breath or difficulty breathing. He is not having any fevers, body aches or chills.           Review of Systems:       10 point ROS was performed and negative unless otherwise noted in HPI.           Past Medical History:   Reviewed and updated in Epic.  Past Medical History:   Diagnosis Date     Alcohol dependence (H)     lives in wet house.  Per family has had seizures associated with EtOH in past     Anxiety      Depression      Diverticulosis      Seizures (H)      Substance abuse (H)              Past Surgical History:   Reviewed and updated in Epic.  Past Surgical History:   Procedure Laterality Date     wisdom teeth extraction               Social History:     Social History     Tobacco Use     Smoking status: Current Some Day Smoker     Packs/day: 0.00     Smokeless tobacco: Former User     Types: Chew     Quit date: 2004     Tobacco comment: does not vape    Substance Use Topics     Alcohol use: Yes     Drug use: No             Family History:   Reviewed and updated in Epic.  Family History   Problem Relation Age of Onset     No Known Problems Mother      No Known Problems Father      No Known Problems Sister      No Known Problems Brother              Allergies:     Allergies   Allergen Reactions     Latex      Pcn [Penicillins] Hives and Rash             Medications:     Medications Prior to Admission   Medication Sig Dispense Refill Last Dose     " "ibuprofen (ADVIL/MOTRIN) 200 MG capsule Take 400 mg by mouth every 4 hours as needed for fever   Past Month     multivitamin, therapeutic (THERA-VIT) TABS tablet Take 1 tablet by mouth daily   Past Week        Current Facility-Administered Medications   Medication     acetaminophen (TYLENOL) tablet 650 mg     alum & mag hydroxide-simethicone (MAALOX) suspension 30 mL     atenolol (TENORMIN) tablet 50 mg     diazepam (VALIUM) tablet 5-20 mg     folic acid (FOLVITE) tablet 1 mg     hydrOXYzine (ATARAX) tablet 25 mg     multivitamin w/minerals (THERA-VIT-M) tablet 1 tablet     senna-docusate (SENOKOT-S/PERICOLACE) 8.6-50 MG per tablet 1 tablet     thiamine (B-1) tablet 100 mg     traZODone (DESYREL) tablet 50 mg            Physical Exam:   Blood pressure 127/85, pulse 105, temperature 98.4  F (36.9  C), temperature source Oral, resp. rate 18, height 1.753 m (5' 9\"), weight 90.7 kg (200 lb), SpO2 95 %.  Body mass index is 29.53 kg/m .  GENERAL: Alert and oriented x 3. NAD, ambulatory, cooperative  HEENT: Anicteric sclera. Mucous membranes moist. PERRLA. EOMI. NC. AT.  CV: RRR. S1, S2. No murmurs appreciated.   RESPIRATORY: Effort normal on room air. Lungs CTAB with no wheezing, rales, rhonchi.   GI: Abdomen rotund, soft, non distended, non tender.   MUSCULOSKELETAL: No joint swelling or tenderness.  NEUROLOGICAL: No focal deficits. Moves all extremities.   EXTREMITIES: No peripheral edema. Intact bilateral pedal pulses.   SKIN: 3inch in diameter skin excoriation along thoracic spine, gauze removed with some serous drainage apparent. No fluctuance or purulence noted.           Data:   CBC:  Recent Labs   Lab Test 06/07/21  1416   WBC 9.3   RBC 5.09   HGB 16.0   HCT 44.9   MCV 88   MCH 31.4   MCHC 35.6   RDW 12.8          CMP:  Recent Labs   Lab Test 06/07/21  1416      POTASSIUM 3.4   CHLORIDE 102   JT 8.5   CO2 23   BUN 13   CR 0.64*   GLC 88   AST 30   ALT 41   BILITOTAL 1.0   ALBUMIN 4.4   PROTTOTAL 8.4 "   ALKPHOS 86       TSH:  TSH   Date Value Ref Range Status   06/07/2021 0.85 0.40 - 4.00 mU/L Final       Tox screen: EtOH breath 0.252, utox pending collection      Unresulted Labs Ordered in the Past 30 Days of this Admission     No orders found for last 31 day(s).

## 2021-06-08 NOTE — PROGRESS NOTES
[place] Prescription Drug Control Monitoring Note:      NARX SCORES  Narcotic  060  Sedative  120  Stimulant  000  Explanation and GuidanceOVERDOSE RISK SCORE 030  (Range 000-999)  Explanation and Guidance  ADDITIONAL RISK INDICATORS ( 0 )  Explanation and GuidanceThis NarxCare report is based on search criteria supplied and the data entered by the dispensing pharmacy. For more information about any prescription, please contact the dispensing pharmacy or the prescriber. NarxCare scores and reports are intended to aid, not replace, medical decision making. None of the information presented should be used as sole justification for providing or refusing to provide medications. The information on this report is not warranted as accurate or complete.  Graphs  RX GRAPH   Narcotic  Buprenorphine  Sedative  Stimulant  Other  Prescribers  1 - RADHA Diana  Timeline  06/08  2m  6m  1y  2y  Buprenorphine mg  16  4  0  28  Timeline  06/08  2m  6m  1y  2y  Morphine MgEq (MME)  200  80  0  320  Timeline  06/08  2m  6m  1y  2y  Lorazepam MgEq (LME)  10  2  0  18  Timeline  06/08  2m  6m  1y  2y  *Per CDC guidance, the MME conversion factors prescribed or provided as part of the medication-assisted treatment for opioid use disorder should not be used to benchmark against dosage thresholds meant for opioids prescribed for pain. Buprenorphine products have no agreed upon morphine equivalency, and as partial opioid agonists, are not expected to be associated with overdose risk in the same dose-dependent manner as doses for full agonist opioids. MME = morphine milligram equivalents. LME = Lorazepam milligram equivalents. mg = dose in milligrams.  Summary  Summary  Total Prescriptions:  1  Total Prescribers:  1  Total Pharmacies:  1  Narcotics* (excluding Buprenorphine)  Current Qty:  0  Current MME/day:  0.00  30 Day Avg MME/day:  0.00  Sedatives*  Current Qty:  0  Current LME/day:  0.00  30 Day Avg  LME/day:  0.43  Buprenorphine*  Current Qty:  0  Current mg/day:  0.00  30 Day Avg mg/day:  0.00  Rx Data  PRESCRIPTIONS  Total Prescriptions: 1  Total Private Pay: 0  Fill Date ID Written Sold Drug Qty Days Prescriber Rx # Pharmacy Refill Daily Dose * Pymt Type   05/07/2021 1 05/07/2021 05/07/2021 Diazepam 10 Mg Tablet  15.00 14 [facility] Franc[facility] [id number] [id number] 0/0 1.07 LME Medicaid [place]  *Per CDC guidance, the MME conversion factors prescribed or provided as part of the medication-assisted treatment for opioid use disorder should not be used to benchmark against dosage thresholds meant for opioids prescribed for pain. Buprenorphine products have no agreed upon morphine equivalency, and as partial opioid agonists, are not expected to be associated with overdose risk in the same dose-dependent manner as doses for full agonist opioids. MME = morphine milligram equivalents. LME = Lorazepam milligram equivalents. mg = dose in milligrams.  Providers  Total Providers: 1  Name Address [facility] Zipcode Phone  Derek Unger MD [address] [place] [address] [phone]  Pharmacies  Total Pharmacies: 1  Name Address [facility] Zipcode Phone  [facility] [phone] [place] [address] [phone]  The report provided is based upon the search criteria entered and the corresponding data as it has been reported by dispenser(s). If erroneous information is identified or additional information is needed, please contact the dispenser or the prescriber provided on the report. Date Sold signifies the date the prescription was sold (left the pharmacy). The absence of Date Sold does not necessarily indicate the prescription was not dispensed. Fill Date represents the date the medication was filled or prepared by the pharmacy. Note, federal regulation (CFR Title 42: Part 2) requires patient consent prior to releasing certain patient data from federally funded opioid treatment programs (OTPs). As such, controlled  substances dispensed from OTPs for medication-assisted treatment may not appear in the [place]  report. Morphine milligram equivalent (MME) conversion factors published by the CDC are used in the MME calculation. Per the CDC, the MME conversion factor is intended only for analytic purposes where prescription data are used to retrospectively calculate daily MME to inform analyses of risks associated with opioid prescribing. This value does not constitute clinical guidance or recommendations for converting patients from one form of opioid analgesic to another. Per the CDC, the conversion factors for drugs prescribed or provided, as part of medication-assisted treatment for opioid use disorder should not be used to benchmark against MME dosage thresholds meant for opioids prescribed for pain. Buprenorphine products listed in the CDC s MME file do not have an associated conversion factor. Lastly, the CDC notes, in clinical practice, calculating MME for methadone often involves a sliding-scale approach, whereby the conversion factor increases with increasing dose. The conversion factor of 3 for methadone presented in this file could underestimate MME for a given patient. This report contains confidential information, including patient identifiers, and is not a public record. The information on this report must be treated as protected health information and is only to be disclosed to others as authorized by applicable state and Federal regulations.

## 2021-06-08 NOTE — PLAN OF CARE
Behavioral Team Discussion: (6/8/2021)    Continued Stay Criteria/Rationale: Patient admitted for Chemical Use Issues.  Plan: The following services will be provided to the patient; psychiatric assessment, medication management, therapeutic milieu, individual and group support, and skills groups.   Participants: 3A Provider: Dr. Darnell Stringer MD; 3A RN: Mandy Gastelum RN; 3A CM's: Shyanne Arreola  and Mary Askew.  Summary/Recommendation: Providers will assess today for treatment recommendations, discharge planning, and aftercare plans. CM will meet with pt for discharge planning.   Medical/Physical: Per ED note:    Alcohol dependence (H)       lives in wet house.  Per family has had seizures associated with EtOH in past     Anxiety       Depression       Diverticulosis       Seizures (H)       Substance abuse (H)      Precautions:   Behavioral Orders   Procedures     Code 1 - Restrict to Unit     Routine Programming     As clinically indicated     Seizure precautions     Status 15     Every 15 minutes.     Withdrawal precautions     Rationale for change in precautions or plan: N/A  Progress: Initial.

## 2021-06-08 NOTE — PLAN OF CARE
Problem: Substance Withdrawal  Goal: Substance Withdrawal  Description: Signs and symptoms of listed problems will be absent or manageable.  Outcome: No Change   Pt was agitated and anxious with c/o chest pain( retrosternal pain per PA) at change of shift. He was seen by the PA. EKG abnormal . No new order. Other tests were performed and the results WNL ( 1st Trop 0.015,Mag 1.8 and hgb 14). Pt is on I&O and daily Wts. He c/o stuffy nose and has sudafed ordered prn. Pt denied SI,HI, and AVH. Pt has a flat affect and he is depressed. Appetite is fine. His goal is getting a good sleep tonite. He was in and out of his room as desired. MSSA scores 10, 8 ,12 and valium 10mg given x3. Pt given tylenol x2 for headache, retrosternal pain( chest pain per pt),maalox and Zyprexa 5mg for increase anxiety.  Pt seen by the Hospitalist. Chest CT scan has been ordered.Pt is currently down for his test.JORDON to re-assess for pain and MSSA score following his last TX at 2039.

## 2021-06-08 NOTE — PROGRESS NOTES
6/8/2021   Insurance Company Name [facility]    Restricted Recipient Program  Please call the [facility] unit for additional information. The telephone number is [phone] or [phone].  Provider number [id number], [facility] is a provider for a Restricted Recipient for: Physician Services ,  Nurse Practitioner Services  Provider number [id number], [facility] is a provider for a Restricted Recipient for: Physician Services ,  Nurse Practitioner Services ,  Diagnostic Lab  Provider number [id number], [facility] is a provider for a Restricted Recipient for: Physician Services ,  Inpatient Hospital ,  Diagnostic Lab ,  Outpatient Hospital  Provider number [id number], [facility] is a provider for a Restricted Recipient for: Pharmacy  If you are providing services other than the restricted services listed above, you may bill DHS. If you have questions, please call [phone] or [phone].

## 2021-06-08 NOTE — H&P
Anirudh Motley is a 50 year old male who was referred by self     History was provided by PATEINT who was a good historian.   CHIEF COMPLAINT: Relapse    HISTORY OF PRESENT ILLNESS:    Patient is a 50-year-old  male who came to the emergency room brought in by EMS for alcohol intoxication.  He was [place] [employer]. Patient was supposed to go to [facility] house .he had [facility], he relapsed being transitioned to a wet house. He reports he has been drinking 1 L a day. He reports he prefer the wet house because when he relapses he is not kicked out and he doesn't lose his housing. He is originally from [place] and would like to go back there.  Patient has been using the following substances: Alcohol  Started at age 12, became a problem at 37 after his divorce    Patient has tolerance, withdrawal, progressive use, loss of control, spending more time and more amount than intended. Patient has made attempts to quit, is experiencing cravings, and reports negative consequences.    He does have a history of seizures was on Keppra in the past       He has no history of DTs                   Denies thoughts of suicide or harming others.      Denies auditory or visual hallucinations.     Patient smokes 0 cigarettes    Patient denied any gambling    Substance Age first use First became regular or problematic Most recent use            Cannabis  none     Cocaine NONE       Stimulants NONE       Opioids NONE       Sedatives NONE       Hallucinogens NONE       Inhalants NONE       Other         OTC drugs NONE       Nicotine           PSYCHIATRIC REVIEW OF SYSTEMS:         Psychiatric Review of Systems:   Depression:     Denies: depressed mood, suicidal ideation, decreased interest, changes in sleep, changes in appetite, guilt, hopelessness, helplessness, impaired concentration, decreased energy, irritability.  Noelle:    Denied sleeplessness, impulsiveness,  racing thoughts, increased goal-directed activities, pressured speech, increase in energy  Noelle Feeling euphoric,Distractible,Impulsive,Grandiose,Talking excessively,Have energy without sleeping,Mood swings,Irritability  Denies: sleeplessness, increased goal-directed activities, abrupt increase in energy, pressured speech  Psychosis:     Denies: visual hallucinations, auditory hallucinations, paranoia  Anxiety:    Denied excessive worries that are difficult to control for the past 6 months,   Chronic anxiety , not able to stop worrying impacting sleep, poor conc, irritable , muscle tension    Anxiety  Pt has following s/o of anxiety  Feeling anxious all the time,Excessive worry,Not able to stop worrying,Impacting sleep,Concentration,Muscle tension,Irritability,Fatigue  Denied panic attacks      Denies: worries that are difficult to control for the past 6 months, panic attacks  PTSD:     Denies: re-experiencing past trauma, nightmares, increased arousal, avoidance of traumatic stimuli, impaired function.  OCD:     Denies: obsessions, checking, symmetry, cleaning, skin picking.  ED:     Denies: restriction, binging, purging.    Denied symptoms of attention deficit disorder include a failure to pay attention to detail, a pattern of careless mistakes, a pattern of inattentive listening, a failure to follow through with projects, poor personal organization, losing necessary objects, distractibility, forgetfulness.    Denied symptoms of borderline personality disorder include a fear of abandonment, unstable self-image, impulsive behavior, dissociative feeling, intense anger, unstable personal relationships, chronic feelings of boredom, periods of intense depressed mood.              PSYCHIATRIC HISTORY         Past court commitments: none  SIB /SUICIDE ATTEMPTS NONE  Psych Hosp : None  Outpatient Programs none  Inpatient cd trt 10+  Out pt cd trt many        SOCIAL HISTORY                                                                          He is  has 3 children presently homeless          Family History:   FAMILY HISTORY:   Family History   Problem Relation Age of Onset     No Known Problems Mother      No Known Problems Father      No Known Problems Sister      No Known Problems Brother      Family Mental Health History-  none    Substance Use Problems - present for parents having alcohol use             PTA Medications:     Medications Prior to Admission   Medication Sig Dispense Refill Last Dose     ibuprofen (ADVIL/MOTRIN) 200 MG capsule Take 400 mg by mouth every 4 hours as needed for fever   Past Month     multivitamin, therapeutic (THERA-VIT) TABS tablet Take 1 tablet by mouth daily   Past Week          Allergies:     Allergies   Allergen Reactions     Latex      Pcn [Penicillins] Hives and Rash          Labs:     Recent Results (from the past 48 hour(s))   Alcohol breath test POCT    Collection Time: 06/07/21  1:41 PM   Result Value Ref Range    Alcohol Breath Test 0.252 (A) 0.00 - 0.01   CBC with platelets differential    Collection Time: 06/07/21  2:16 PM   Result Value Ref Range    WBC 9.3 4.0 - 11.0 10e9/L    RBC Count 5.09 4.4 - 5.9 10e12/L    Hemoglobin 16.0 13.3 - 17.7 g/dL    Hematocrit 44.9 40.0 - 53.0 %    MCV 88 78 - 100 fl    MCH 31.4 26.5 - 33.0 pg    MCHC 35.6 31.5 - 36.5 g/dL    RDW 12.8 10.0 - 15.0 %    Platelet Count 230 150 - 450 10e9/L    Diff Method Automated Method     % Neutrophils 60.8 %    % Lymphocytes 29.2 %    % Monocytes 5.9 %    % Eosinophils 2.5 %    % Basophils 1.4 %    % Immature Granulocytes 0.2 %    Nucleated RBCs 0 0 /100    Absolute Neutrophil 5.7 1.6 - 8.3 10e9/L    Absolute Lymphocytes 2.7 0.8 - 5.3 10e9/L    Absolute Monocytes 0.6 0.0 - 1.3 10e9/L    Absolute Eosinophils 0.2 0.0 - 0.7 10e9/L    Absolute Basophils 0.1 0.0 - 0.2 10e9/L    Abs Immature Granulocytes 0.0 0 - 0.4 10e9/L    Absolute Nucleated RBC 0.0    Comprehensive metabolic panel    Collection Time: 06/07/21   "2:16 PM   Result Value Ref Range    Sodium 139 133 - 144 mmol/L    Potassium 3.4 3.4 - 5.3 mmol/L    Chloride 102 94 - 109 mmol/L    Carbon Dioxide 23 20 - 32 mmol/L    Anion Gap 14 3 - 14 mmol/L    Glucose 88 70 - 99 mg/dL    Urea Nitrogen 13 7 - 30 mg/dL    Creatinine 0.64 (L) 0.66 - 1.25 mg/dL    GFR Estimate >90 >60 mL/min/[1.73_m2]    GFR Estimate If Black >90 >60 mL/min/[1.73_m2]    Calcium 8.5 8.5 - 10.1 mg/dL    Bilirubin Total 1.0 0.2 - 1.3 mg/dL    Albumin 4.4 3.4 - 5.0 g/dL    Protein Total 8.4 6.8 - 8.8 g/dL    Alkaline Phosphatase 86 40 - 150 U/L    ALT 41 0 - 70 U/L    AST 30 0 - 45 U/L   Lipase    Collection Time: 06/07/21  2:16 PM   Result Value Ref Range    Lipase 84 73 - 393 U/L   GGT    Collection Time: 06/07/21  2:16 PM   Result Value Ref Range    GGT 47 0 - 75 U/L   TSH with free T4 reflex    Collection Time: 06/07/21  2:16 PM   Result Value Ref Range    TSH 0.85 0.40 - 4.00 mU/L   Vitamin B12    Collection Time: 06/07/21  2:16 PM   Result Value Ref Range    Vitamin B12 669 193 - 986 pg/mL   Asymptomatic SARS-CoV-2 COVID-19 Virus (Coronavirus) by PCR    Collection Time: 06/07/21  4:14 PM    Specimen: Nasopharyngeal   Result Value Ref Range    SARS-CoV-2 Virus Specimen Source Nasopharyngeal     SARS-CoV-2 PCR Result NEGATIVE     SARS-CoV-2 PCR Comment (Note)          BP (!) 140/93   Pulse 95   Temp 97.7  F (36.5  C) (Temporal)   Resp 16   Ht 1.753 m (5' 9\")   Wt 90.7 kg (200 lb)   SpO2 95%   BMI 29.53 kg/m    Weight is 200 lbs 0 oz  Body mass index is 29.53 kg/m .    Physical Exam:     ROS: 10 point ROS neg other than the symptoms noted above in the HPI.            Past Medical History:   PAST MEDICAL HISTORY:   Past Medical History:   Diagnosis Date     Alcohol dependence (H)     lives in wet house.  Per family has had seizures associated with EtOH in past     Anxiety      Depression      Diverticulosis      Seizures (H)      Substance abuse (H)        PAST SURGICAL HISTORY:   Past " Surgical History:   Procedure Laterality Date     wisdom teeth extraction         -    -           MENTAL STATUS EXAM:      Constitutional: General appearance of patient:  Appearance:  awake, alert, appeared as age stated, adequate groomed and slightly unkempt  Attitude:  cooperative  Eye Contact:  good  Mood:   Good  Affect:  congruent   Speech:  clear, coherent normal rate   Psychomotor Behavior:  no evidence of tardive dyskinesia, dystonia, or tics  Thought Process:  logical, linear and goal oriented  Associations:  no loose associations  Thought Content:  no evidence of psychotic thought and active suicidal ideation present  Denied any active suicidal /homicidation ideation plan intent   Insight:  fair  Judgment:  fair  Oriented to:  time, person, and place  Attention Span and Concentration:  intact  Recent and Remote Memory:  intact  Language:  english with appropriate syntax and vocabulary  Fund of Knowledge: appropriate  Muscle Strength and Tone: normal  Gait and Station: Normal     There are no abnormal or psychotic thoughts, no preoccupations, no overvalued ideas, no rumination, no obsessions, no compulsions, no somatic concerns, no hypochrondriasis, no ideas of reference, and no delusions.  Patient denies homicidal thoughts.   Patient denies suicidal thoughts.  Patient appears to have good judgment and good insight.     Musculoskeletal: Patient shows no abnormalities of motor activity: there is no tremor, no tic, and no dystonia.  There is no apparent muscle atrophy, strength and tone appear normal, and there are no abnormal movements.  Patient has normal gait and stance.    DISCUSSION:         Assessment:       Patient has a biological predisposition with family history positive for family history of alcoholism  Psychologically patient is experiencing abusing alcohol with tolerance withdrawal progress of control  Patient has these particular stressors relapse from housing job  Patient has chronic illness  exacerbation leading to hospitalization progression as described.     Patient has been unable to stop using drugs in the community due to both physical and psychological symptoms.  Continued use will put the patient at risk for medical and/or psychiatric complications.      Inpatient psychiatric hospitalization is warranted at this time for safety, stabilization, and possible adjustment in medications.       Diagnoses:    Alcohol use disorder severe  Alcohol withdrawal severe          Plan:   Problem list  1#alcohol use disorder severe alcohol withdrawal severe     Breathalyzer on admission is 0.252 pulse is 95 blood pressure 140/93  Patient has eating disturbance tremor sleep disturbance agitation sweats elevated pulse he is required 55 mg of Valium since admission today morning he scored a 13 and required 10 mg of Valium  - [facility] protocol using Valium for management of alcohol withdrawal    - Continue thiamine, folate, and multivitamin daily          - Consider anti-craving medications prior to discharge. Pt willing to review additional information about both naltrexone and Antabuse.    Alcohol withdrawal nausea prn Zofran as needed for nausea     hydroxyzine 25 mg q4h prn for acute anxiety  Trazodone 50 mg at bedtime prn for sleep disturbances       Patient has been unable to stop using drugs in the community due to both physical and psychological symptoms.  Continued use will put the patient at risk for medical and/or psychiatric complications.    I HAVE REVIEWED LABS WITH PT AND TALKED ABOUT RESULTS WITH PT  I HAVE REVIEWED AND SUMMARIZED OLD RECORDS including his medication reconcilation of his home medications  and PDMP   I HAVE SPOKEN WITH RN ABOUT MEDICATIONS AND DETOX SCORES  I HAVE SPOKEN WITH CM ABOUT PTS TREATMENT OPTIONS     s          Laboratory/Imaging:    Liver Function Studies -   Recent Labs   Lab Test 06/07/21  1416   PROTTOTAL 8.4   ALBUMIN 4.4   BILITOTAL 1.0   ALKPHOS 86   AST 30   ALT 41       Last Comprehensive Metabolic Panel:  Sodium   Date Value Ref Range Status   06/07/2021 139 133 - 144 mmol/L Final     Potassium   Date Value Ref Range Status   06/07/2021 3.4 3.4 - 5.3 mmol/L Final     Chloride   Date Value Ref Range Status   06/07/2021 102 94 - 109 mmol/L Final     Carbon Dioxide   Date Value Ref Range Status   06/07/2021 23 20 - 32 mmol/L Final     Anion Gap   Date Value Ref Range Status   06/07/2021 14 3 - 14 mmol/L Final     Glucose   Date Value Ref Range Status   06/07/2021 88 70 - 99 mg/dL Final     Urea Nitrogen   Date Value Ref Range Status   06/07/2021 13 7 - 30 mg/dL Final     Creatinine   Date Value Ref Range Status   06/07/2021 0.64 (L) 0.66 - 1.25 mg/dL Final     GFR Estimate   Date Value Ref Range Status   06/07/2021 >90 >60 mL/min/[1.73_m2] Final     Comment:     Non  GFR Calc  Starting 12/18/2018, serum creatinine based estimated GFR (eGFR) will be   calculated using the Chronic Kidney Disease Epidemiology Collaboration   (CKD-EPI) equation.       Calcium   Date Value Ref Range Status   06/07/2021 8.5 8.5 - 10.1 mg/dL Final     Bilirubin Total   Date Value Ref Range Status   06/07/2021 1.0 0.2 - 1.3 mg/dL Final     Alkaline Phosphatase   Date Value Ref Range Status   06/07/2021 86 40 - 150 U/L Final     ALT   Date Value Ref Range Status   06/07/2021 41 0 - 70 U/L Final     AST   Date Value Ref Range Status   06/07/2021 30 0 - 45 U/L Final                   Medical treatment/interventions:  Medical concerns: As above    - Consults: IM consult placed. Appreciate assistance.     Legal Status: Voluntary     Safety Assessment:   Checks: Status 15  Pt has not required locked seclusion or restraints in the past 24 hours to maintain safety, please refer to RN documentation for further details.    The risks, benefits, alternatives and side effects have been discussed and are understood by the patient.       Patient will be treated in therapeutic milieu with appropriate  "individual and group therapies as described.  Disposition: Pending clinical stabilization. Pt does  appear interested in COMPLETE DETOX AND DO TRT  Length of stay 3-5 days        \"Much or all of the text in this note was generated through the use of Dragon Dictate voice to text software. Errors in spelling or words which appear to be out of contact are unintentional, may be present due having escaped editing\"     "

## 2021-06-08 NOTE — PROGRESS NOTES
"Brief Medicine Note    Contacted by nursing regarding new report of retrosternal chest pain, shortness of breath and tachycardia, occurs in the setting of EtOH abuse and withdrawal.    Started ~1:30pm without clear provocation and was laying flat when pain started. Pt has SOB with ambulating down [place]. Pain is 8/10 and not improved with medications. States + palpitations. Also with generalized abdominal pain that is worst in the RUQ. States stools are dark in color. + chills, no fevers. Denies N/V. States he has bruising on abdomen. Notes urine is dark rust colored x several months.  Denies current LE edema, adenopathy, rashes, dysuria, change in urine output.     (see also consult note from today by Jennifer Sethi PA-C)    Today's vital signs, medications, and nursing notes were reviewed. Labs reviewed, trop pending.     /86   Pulse 120   Temp 96  F (35.6  C) (Temporal)   Resp 18   Ht 1.753 m (5' 9\")   Wt 90.7 kg (200 lb)   SpO2 96%   BMI 29.53 kg/m    General: A&O. NAD. Ambulatory without assistance  CV: tachycardic, RRR, no m/r/g  PULM: + DUSTIN rales, no wheezing/rhonchi, otherwise CTA  HEENT: mmm, NC, AT  Extremities: + spooning of the fingernails, no edema, + PT and radial pulses bilaterally  Abdomen: soft, stretch marks, no other lesions, NABS  Back: no lesions, right lower paravertebral tenderness, no distinct CVA tenderness    A/P:  Retrosternal chest pain  History of reduced RV function  Shortness of breath with minimal exertion, Chest pain started ~1:30pm today and is constant and severe and radiates to the abdominen and is not improved with medications and is worse with sitting down.  Patient also notes severe anxiety not improved with current medications. + palpitations. TTE 2013 with LVEF 50-55% no WMA and reduced LV function and mild LAE, calcified MV.  - EKG pending  - CXR shows lungs clear, gastric bubble apparent  - repeat TTE  - daily weights  -monitor intakes and " "outputs    Addendum:  EKG with QTc prolongation 510ms, no ST-T wave abnormality, trop is pending  - follow up trop  - repeat EKG if elevated trop or persistent or worsening symptoms    Concern for melena  Reported per patient though he is not an optimal historian (for example he stated he stretch marks over abdomen were bruises)  - trend hgb in a.m.   - conditional hgb checks if hypotension/signs of bleeding    Concern for hematuria  Reports dark \"rust\" color to urine  - follow up UA    Medicine will follow up on EKG, TTE, Xray read, a.m. CBC, vitals, I&O and weights    Please call if further questions or concerns      Avelina West PA-C  [facility]  Contact information available via [facility] Paging/Directory      "

## 2021-06-08 NOTE — PROGRESS NOTES
I was asked to follow troponin which is still in process  Will continue to follow    Troponin is negative    Addendum: I was asked to see the patient as he complained of congestion, headache, and chest pain. Saw the patient  Patient reports central chest pain. Also reports racing thought, headache and nasal congestion. He reports this happens when he goes through withdrawal. He also reports pain with deep breathing and ambulation. Not related to food intake    CVS, and Respiratory exam normal  Tenderness to touch on mid sternal area    Chest pain seems more related to anxiety. Initial EKG, and troponin negative.   Will obtain CT PE to complete evaluation  Advise RN to call psych team to treat anxiety    Garo Ge MD  [facility]  Contact information available via [facility] Paging/Directory

## 2021-06-08 NOTE — PLAN OF CARE
Pt denies any SI/SIB but rates depression 6/10 and anxiety 8/10.  Pt rates agitation 8/10 and denies any thoughts of hurting others. Pt is very restless and pacing while waiting for meds. Pt MSSA score 13 this AM and 10 mg of valium given.  Pt has history of seizures.  Pt 2nd MSSA score was 6 and did not receive any prns.  Pt c/o generalized aches but refused pain meds.  Pt has open wound on back.  Writer cleaned wound bed and applied mepilex per PA orders.  Pt ate 100 percent of lunch.  PT c/o chest tightness and SOB.  Pulse 120, O2 96%, blood pressure 136/86.  Writer called on-call stat for evaluation and gave pt maalox and hydroxyzine for anxiety and concerns with heart burn.  Pt was laying flat when episode started to occur.

## 2021-06-08 NOTE — PROGRESS NOTES
S: Patient is a 50 year old male admitted from [place] ED for alcohol detox.    B: Patient was found in front of [employer] passed out. Patient said has been drinking 1 Liter of vodka daily, and usually binge drinks. Last drink was 6/7/2021. Patient was in treatment at the [facility], where he was sober for 28 and relapsed immediately after discharge. Patient is homeless.     Medical hx.  Alcohol withdrawal seizure, anxiety, and depression. No hx of DTs. Last seizure was 6 years ago.     Labs:  Alcohol breath test was 0.252.  COVID 19 test results were negative on  6/7/21.     Allergies include Latex and Penicillins.     Skin: Patient has 1 wound to lower back, 1 to left hand, and 2 areas that looks like bite marks to left lower abdomen. The wounds were covered in the ED. Patient said does not know when and how the wounds got there.    A: Patient was cooperative with the admission assessment. Alert and oriented x 3. Vitals were B/P: 142/94, T: 97.9, P: 105, R: 16 and O2 Sats of 95% on RA.       R: Continue to assess and treat as ordered.

## 2021-06-08 NOTE — ED NOTES
3A RN, Vandana, called and informed of updated skin assessment. Mepilex and bacitracin applied to the thoracic area before pt transported up there just for temporary comfort and coverage during transport.

## 2021-06-09 ENCOUNTER — APPOINTMENT (OUTPATIENT)
Dept: CARDIOLOGY | Facility: CLINIC | Age: 51
End: 2021-06-09
Attending: PHYSICIAN ASSISTANT
Payer: COMMERCIAL

## 2021-06-09 LAB
CHOLEST SERPL-MCNC: 139 MG/DL
HDLC SERPL-MCNC: 55 MG/DL
INTERPRETATION ECG - MUSE: NORMAL
LDLC SERPL CALC-MCNC: 45 MG/DL
NONHDLC SERPL-MCNC: 84 MG/DL
TRIGL SERPL-MCNC: 193 MG/DL
TROPONIN I SERPL-MCNC: <0.015 UG/L (ref 0–0.04)

## 2021-06-09 PROCEDURE — 93306 TTE W/DOPPLER COMPLETE: CPT

## 2021-06-09 PROCEDURE — 84484 ASSAY OF TROPONIN QUANT: CPT | Performed by: PEDIATRICS

## 2021-06-09 PROCEDURE — 250N000013 HC RX MED GY IP 250 OP 250 PS 637: Performed by: PSYCHIATRY & NEUROLOGY

## 2021-06-09 PROCEDURE — 128N000004 HC R&B CD ADULT

## 2021-06-09 PROCEDURE — 36415 COLL VENOUS BLD VENIPUNCTURE: CPT | Performed by: PEDIATRICS

## 2021-06-09 PROCEDURE — 99231 SBSQ HOSP IP/OBS SF/LOW 25: CPT | Performed by: PSYCHIATRY & NEUROLOGY

## 2021-06-09 PROCEDURE — 93306 TTE W/DOPPLER COMPLETE: CPT | Mod: 26 | Performed by: INTERNAL MEDICINE

## 2021-06-09 PROCEDURE — 80061 LIPID PANEL: CPT | Performed by: PEDIATRICS

## 2021-06-09 RX ORDER — FOLIC ACID 1 MG/1
1 TABLET ORAL DAILY
Qty: 30 TABLET | Refills: 0 | Status: SHIPPED | OUTPATIENT
Start: 2021-06-10

## 2021-06-09 RX ORDER — LANOLIN ALCOHOL/MO/W.PET/CERES
100 CREAM (GRAM) TOPICAL DAILY
Qty: 30 TABLET | Refills: 0 | Status: SHIPPED | OUTPATIENT
Start: 2021-06-10

## 2021-06-09 RX ADMIN — HYDROXYZINE HYDROCHLORIDE 25 MG: 25 TABLET, FILM COATED ORAL at 20:27

## 2021-06-09 RX ADMIN — IBUPROFEN 600 MG: 600 TABLET, FILM COATED ORAL at 08:53

## 2021-06-09 RX ADMIN — FOLIC ACID 1 MG: 1 TABLET ORAL at 08:51

## 2021-06-09 RX ADMIN — TRAZODONE HYDROCHLORIDE 50 MG: 50 TABLET ORAL at 20:27

## 2021-06-09 RX ADMIN — Medication 1 TABLET: at 08:51

## 2021-06-09 RX ADMIN — DIAZEPAM 10 MG: 5 TABLET ORAL at 04:05

## 2021-06-09 RX ADMIN — DIAZEPAM 10 MG: 5 TABLET ORAL at 08:51

## 2021-06-09 RX ADMIN — DIAZEPAM 10 MG: 5 TABLET ORAL at 13:12

## 2021-06-09 RX ADMIN — THIAMINE HCL TAB 100 MG 100 MG: 100 TAB at 08:51

## 2021-06-09 NOTE — PROGRESS NOTES
"[facility], [place]   Psychiatric Progress Note        Interim history   This is a 50 year old male with Alcohol use disorder severe.Pt seen in rounds.   The patient's care was discussed with the treatment team during the daily team meeting and/or staff's chart notes were reviewed.  Staff report patient has been visible in the milieu,  no acute eventsovernight.     Patient's mood is TIRED  Energy Level:LOW  Sleep:No concerns, sleeps well through night  Appetite:fair low  motivation interest   deneid any Suicidal/homicidal ideation/plan intent.  Denied any psychosis  No prior suicde attempts  No access to gun  Pt is in alcohol withdrawal still being monitered every 4 hrs for it,   Pt mssa score are monitered  Tolerating meds and has no side effects.              Medications:     Current Facility-Administered Medications   Medication     acetaminophen (TYLENOL) tablet 650 mg     alum & mag hydroxide-simethicone (MAALOX) suspension 30 mL     diazepam (VALIUM) tablet 5-20 mg     folic acid (FOLVITE) tablet 1 mg     hydrOXYzine (ATARAX) tablet 25 mg     ibuprofen (ADVIL/MOTRIN) tablet 600 mg     multivitamin w/minerals (THERA-VIT-M) tablet 1 tablet     OLANZapine zydis (zyPREXA) ODT tab 5 mg     pseudoePHEDrine (SUDAFED) tablet 30 mg     senna-docusate (SENOKOT-S/PERICOLACE) 8.6-50 MG per tablet 1 tablet     thiamine (B-1) tablet 100 mg     traZODone (DESYREL) tablet 50 mg             Allergies:     Allergies   Allergen Reactions     Latex      Pcn [Penicillins] Hives and Rash            Psychiatric Examination:   Blood pressure 104/76, pulse 106, temperature 99.1  F (37.3  C), temperature source Temporal, resp. rate 16, height 1.753 m (5' 9\"), weight 90.7 kg (200 lb), SpO2 96 %.  Weight is 200 lbs 0 oz  Body mass index is 29.53 kg/m .    Appearance:  awake, alert and adequately groomed  Attitude:  cooperative  Eye Contact:  good  Mood:  better  Affect:  appropriate and in normal range and " mood congruent  Speech:  clear, coherent rate /rhythm are good  Psychomotor Behavior:  no evidence of tardive dyskinesia, dystonia, or tics and intact station, gait and muscle tone  Throught Process:  logical  Associations:  no loose associations  Thought Content:  no evidence of suicidal ideation or homicidal ideation, no evidence of psychotic thought, no auditory hallucinations present and no visual hallucinations present  Insight:  good  Judgement:  intact  Oriented to:  time, person, and place  Attention Span and Concentration:  intact  Recent and Remote Memory:  intact  Language fund of knowledge are adequate         Labs:     No results found for: NTBNPI, NTBNP  Lab Results   Component Value Date    WBC 9.3 06/07/2021    HGB 14.0 06/08/2021    HCT 44.9 06/07/2021    MCV 88 06/07/2021     06/07/2021     Lab Results   Component Value Date    TSH 0.85 06/07/2021         DX   Alcohol use disorder severe   PLAN   Alcohol intoxication/withdrawal, presently is on MSSA protocol with Valium. Continue the same MSSA protocol as ordered. Continue thiamine 100 mg p.o. daily, M.V.I. one p.o. daily and folate 1 mg p.o. Daily  Will continue mssa protocal to detox off alcohol on valium,  Pt is c/o of termor , agitation poor sleep and poor appetite, he has sweats, feels shakey  On mssa client scored scored 10 today and  needed needed 10 mg po as of yet , total dose since admission was 105 mg    MSSA    Eating Disturbances: 1  Tremor: 1 - not visibly apparent but can be felt by the examiner placing his fingertip slightly against the patient's fingertips  Sleep Disturbance: slept through the night or not applicable  Clouding of Sensorium: no evidence  Hallucinations: 0 - none  Quality of Contact: 0 - awareness of examiner and people around him/her  Agitation: 1 - somewhat more than normal activity  Paroxysmal Sweats: 1 - barely perceptible sweating  Temperature: 99.5 or below  Pulse: 4 - 100 to 109  Total MSSA Score:  "9    Laboratory/Imaging: reviewed with patient   Consults: internal medicine consult reviewed  Patient will be treated in therapeutic milieu with appropriate individual and group therapies as described.  PDMP CHECKED     Supportive psychotheraoy provided, ilya talked about recovery enviroment, relapse prevention, triggers to use.  Discussed with patient many issues of addiction,triggers, relapse, and establishing a solid recovery program.  Asked pt to be med complinat   Medical diagnoses to be addressed this admission:    Plan:  A/P:  Retrosternal chest pain  History of reduced RV function  Prolonged QTc  Shortness of breath with minimal exertion, Chest pain started ~1:30pm today and is constant and severe and radiates to the abdominen and is not improved with medications and is worse with sitting down.  Patient also notes severe anxiety not improved with current medications. + palpitations. TTE 2013 with LVEF 50-55% no WMA and reduced LV function and mild LAE, calcified MV.  EKG with QTc prolongation 510ms, otherwise nsr. Troponins x 3 flat. CXR shows lungs clear, gastric bubble apparent, no acute pathology. CT PE protocol negative. Weight so far is stable at 200lb.   - repeat TTE today  - continue to trend daily weights, I&O  -monitor intakes and outputs  - avoid QTc prolonging medications     Concern for melena  Reported per patient though he is not an optimal historian (for example he stated he stretch marks over abdomen were bruises)  - hgb stable  - conditional hgb checks if hypotension/signs of bleeding     Concern for hematuria  Reports dark \"rust\" color to urine  - follow up UA, still pending      Medicine will follow up on TTE, UA, weights and I&O    Legal Status: voluntary    Safety Assessment:   Checks:  15 min  Precautions: withdrawal precautions  Pt has not required locked seclusion or restraints in the past 24 hours to maintain safety, please refer to RN documentation for further details.  Discussed " with patient many issues of addiction,triggers, relapse, and establishing a solid recovery program.  Able to give informed consent:  YES   Discussed Risks/Benefits/Side Effects/Alternatives: YES    After discussion of the indications, risks, benefits, alternatives and consequences of no treatment, the patient elects to complete detox and do treatment

## 2021-06-09 NOTE — PROGRESS NOTES
"Brief Medicine Note    Following up regarding retrosternal chest pain. Workup last evening and overnight serial troponins, EKG, CT PE protocol all negative and labs normal    Today's vital signs, medications, and nursing notes were reviewed. Labs reviewed.     /76   Pulse 106   Temp 99.1  F (37.3  C) (Temporal)   Resp 16   Ht 1.753 m (5' 9\")   Wt 90.7 kg (200 lb)   SpO2 96%   BMI 29.53 kg/m        A/P:  Retrosternal chest pain  History of reduced RV function  Prolonged QTc  Shortness of breath with minimal exertion, Chest pain started ~1:30pm today and is constant and severe and radiates to the abdominen and is not improved with medications and is worse with sitting down.  Patient also notes severe anxiety not improved with current medications. + palpitations. TTE 2013 with LVEF 50-55% no WMA and reduced LV function and mild LAE, calcified MV.  EKG with QTc prolongation 510ms, otherwise nsr. Troponins x 3 flat. CXR shows lungs clear, gastric bubble apparent, no acute pathology. CT PE protocol negative. Weight so far is stable at 200lb.   - repeat TTE today  - continue to trend daily weights, I&O  -monitor intakes and outputs  - avoid QTc prolonging medications     Concern for melena  Reported per patient though he is not an optimal historian (for example he stated he stretch marks over abdomen were bruises)  - hgb stable  - conditional hgb checks if hypotension/signs of bleeding     Concern for hematuria  Reports dark \"rust\" color to urine  - follow up UA, still pending     Medicine will follow up on TTE, UA, weights and I&O    Avelina JANES West PA-C  [facility]  Contact information available via [facility] Paging/Directory    "

## 2021-06-09 NOTE — PROGRESS NOTES
"19:30  Pt complaining of continued chest pain with pressure, headache.  Pt had received valium, tylenol, maalox, and sudafed for congestion.  Pt had multiple tests to rule out cardiac origin of pain, chest XR.  Pt reports feeling \"at the end of his rope\" and feels that no one is helping him.  Discussed the repetition of the detox medication in an hour, giving time for some of the medications to work.  Cold pack with cool cloth given to patient for forehead.  House physician called, requested visit to evaluate.  "

## 2021-06-09 NOTE — PROGRESS NOTES
MSSA scores of 6/8, receives 10mg of valium for alcohol withdrawal this shift.No further complaints of chest pain, Troponin @0400 is negative. Pt is observed to sleep throughout the noc.

## 2021-06-10 VITALS
TEMPERATURE: 98 F | OXYGEN SATURATION: 96 % | BODY MASS INDEX: 29.62 KG/M2 | HEIGHT: 69 IN | RESPIRATION RATE: 16 BRPM | DIASTOLIC BLOOD PRESSURE: 77 MMHG | HEART RATE: 87 BPM | WEIGHT: 200 LBS | SYSTOLIC BLOOD PRESSURE: 149 MMHG

## 2021-06-10 LAB
ALBUMIN UR-MCNC: NEGATIVE MG/DL
AMPHETAMINES UR QL SCN: NEGATIVE
APPEARANCE UR: CLEAR
BACTERIA #/AREA URNS HPF: ABNORMAL /HPF
BARBITURATES UR QL: NEGATIVE
BENZODIAZ UR QL: POSITIVE
BILIRUB UR QL STRIP: NEGATIVE
CANNABINOIDS UR QL SCN: NEGATIVE
COCAINE UR QL: NEGATIVE
COLOR UR AUTO: YELLOW
ETHANOL UR QL SCN: NEGATIVE
GLUCOSE UR STRIP-MCNC: NEGATIVE MG/DL
HGB UR QL STRIP: NEGATIVE
KETONES UR STRIP-MCNC: NEGATIVE MG/DL
LEUKOCYTE ESTERASE UR QL STRIP: NEGATIVE
MUCOUS THREADS #/AREA URNS LPF: PRESENT /LPF
NITRATE UR QL: NEGATIVE
OPIATES UR QL SCN: NEGATIVE
PH UR STRIP: 7.5 PH (ref 5–7)
RBC #/AREA URNS AUTO: 1 /HPF (ref 0–2)
SOURCE: ABNORMAL
SP GR UR STRIP: 1.02 (ref 1–1.03)
SQUAMOUS #/AREA URNS AUTO: 0 /HPF (ref 0–1)
UROBILINOGEN UR STRIP-MCNC: 2 MG/DL (ref 0–2)
WBC #/AREA URNS AUTO: 1 /HPF (ref 0–5)

## 2021-06-10 PROCEDURE — 250N000013 HC RX MED GY IP 250 OP 250 PS 637: Performed by: PSYCHIATRY & NEUROLOGY

## 2021-06-10 PROCEDURE — 80307 DRUG TEST PRSMV CHEM ANLYZR: CPT | Performed by: PHYSICIAN ASSISTANT

## 2021-06-10 PROCEDURE — 128N000004 HC R&B CD ADULT

## 2021-06-10 PROCEDURE — 81001 URINALYSIS AUTO W/SCOPE: CPT | Performed by: PHYSICIAN ASSISTANT

## 2021-06-10 PROCEDURE — 80320 DRUG SCREEN QUANTALCOHOLS: CPT | Performed by: PHYSICIAN ASSISTANT

## 2021-06-10 PROCEDURE — 99239 HOSP IP/OBS DSCHRG MGMT >30: CPT | Performed by: PSYCHIATRY & NEUROLOGY

## 2021-06-10 RX ADMIN — IBUPROFEN 600 MG: 600 TABLET, FILM COATED ORAL at 02:01

## 2021-06-10 RX ADMIN — Medication 1 TABLET: at 08:02

## 2021-06-10 RX ADMIN — IBUPROFEN 600 MG: 600 TABLET, FILM COATED ORAL at 20:24

## 2021-06-10 RX ADMIN — FOLIC ACID 1 MG: 1 TABLET ORAL at 08:02

## 2021-06-10 RX ADMIN — THIAMINE HCL TAB 100 MG 100 MG: 100 TAB at 08:02

## 2021-06-10 RX ADMIN — HYDROXYZINE HYDROCHLORIDE 25 MG: 25 TABLET, FILM COATED ORAL at 02:01

## 2021-06-10 RX ADMIN — IBUPROFEN 600 MG: 600 TABLET, FILM COATED ORAL at 08:03

## 2021-06-10 NOTE — PLAN OF CARE
Pt is monitored for alcohol withdrawal, MSSA of 2, 4. Pt did not receive medication this shift for withdrawal    Pt was mainly isolative to room sleeping. Pt had no complaints of pain that he experienced on day shift. Pt ate 100% of dinner and had snacks.

## 2021-06-10 NOTE — PLAN OF CARE
Pt's MSSA score @ midnight was 5 and @ 0200 it was 6.  Pt ate a snack and received Ibuprofen and hydroxyzine per request for a headache @ 0200.  He fell a sleep after receiving prn medication.

## 2021-06-10 NOTE — DISCHARGE INSTRUCTIONS
Behavioral Discharge Planning and Instructions  THANK YOU FOR CHOOSING [facility]  3A  [phone]    Summary: You were admitted to Station 3A on 6/7/2021 for detoxification from alcohol.  A medical exam was performed that included lab work. You have met with a  and opted to [facility].  Please take care and make your recovery a daily priority, Anirudh!  It was a pleasure working with you and the entire treatment team here wishes you the very best in your recovery!     Recommendation:  Remain abstinent from all mood-altering chemicals except those prescribed by a medical provider. If problems persist, consider obtaining a chemical dependency assessment (call [phone] to schedule) and following all recommendations.      Main Diagnoses:  Per Dr. Dr. Darnell Stringer MD  303.90 (F10.20) Alcohol Use Disorder Severe    Major Treatments, Procedures and Findings:  You were treated for alcohol detoxification using Valium per [facility] protocol. You did not complete a chemical dependency assessment. You had labs drawn and those results were reviewed with you. Please take a copy of your lab work with you to your next primary care provider appointment.    Symptoms to Report:  If you experience more anxiety, confusion, sleeplessness, deep sadness or thoughts of suicide, notify your treatment team or notify your primary care provider. IF ANY OF THE SYMPTOMS YOU ARE EXPERIENCING ARE A MEDICAL EMERGENCY CALL 911 IMMEDIATELY.     Lifestyle Adjustment: Adjust your lifestyle to get enough sleep, relaxation, exercise and good nutrition. Continue to develop healthy coping skills to decrease stress and promote a sober living environment. Do not use mood altering substances including alcohol, illegal drugs or addictive medications other than what is currently prescribed.     Disposition:   [facility]  Admit date/time: Cab is set up for 7 AM. [employer] ([phone]).  Confirmation#: [id number]  Address: [address] [place] [address]  Phone: [phone]    Facts about COVID19 at www.cdc.gov/COVID19 and www.[place].gov/covid19    Keeping hands clean is one of the most important steps we can take to avoid getting sick and spreading germs to others.  Please wash your hands frequently and lather with soap for at least 20 seconds!    Follow-up Appointment:   Appointment Date/Time: ***    Psychiatrist/Primary Care Giver: ***    Address: ***    Phone Number: ***      Recovery apps for your phone to locate current in person and zoom recovery meetings  [place] - meeting jasbir  AA  - meeting jasbir  Meeting guide - meeting jasbir  Quick NA meeting - meeting jasbir  Liam- has various apps    Resources:  *due to covid-19 most AA/NA meetings are being held online*  AA meetings online search for them at: https://aa-intergroup.org (worldwide meeting listings)  AA meetings for [place] area can be found online at: https://a[place].org (click local online meetings listings)  NA meetings for [place] area can be found online at: https://www.nam[place].org  (click find a meeting)  AA and NA Sponsors are excellent resources for support and you can find one at any support group meeting.   Alcoholics Anonymous (https://aa.org/): for information 24 hours/day  AA Intergroup service office in [place] (http://www.aastpaul.org/) 631.326.7102  AA Intergroup service office in [facility]: [phone]. (http://www.a[place].org/)  Narcotics Anonymous (www.nam[place].org) (415) 377-9408  https://aafairviewriverside.org/meetings  SMART Recovery - self management for addiction recovery:  www.smartrecovery.org  Pathways ~ A Health Crisis Resource & Support Center:  740.961.9522.  https://prescribetoprevent.org/patient-education/videos/  http://www.harmreduction.org  [facility] [phone]  Support Group:  AA/NA and Sponsor/support.  National [place] on Mental Illness  (www.[place].elba.org): 246.757.6366 or 398-975-4118.  Alcoholics Anonymous (www.alcoholics-anonymous.org): Check your phone book for your local chapter.  Suicide Awareness Voices of Education (SAVE) (www.save.org): 258-981-LKHF (9883)  National Suicide Prevention Line (www.mentalhealthmn.org): 945-537-UOTD (0017)  Mental Health Consumer/Survivor Network of [place] (www.mhcsn.net): 404.843.6328 or 449-626-3119  Mental Health Association of [place] (www.mentalhealth.org): 956.839.9672 or 785-949-7208   Substance Abuse and Mental Health Services (www.samhsa.gov)  [place] Opioid Prevention Coalition: www.opioidcoalition.org    [place] Recovery Connection (MRC)  [facility] connects people seeking recovery to resources that help foster and sustain long-term recovery.  Whether you are seeking resources for treatment, transportation, housing, job training, education, health care or other pathways to recovery, [facility] is a great place to start.  283.887.3102.  www.[place].[employer] Pod casts for nutrition and wellness  Listen on Apple Podcasts  Dishing Up Nutrition   [employer]   Nutrition       Understand the connection between what you eat and how you feel. Hosted by licensed nutritionists and dietitians from [employer] we share practical, real-life solutions for healthier living through nutrition.     General Medication Instructions:   See your medication sheet(s) for instructions.   Take all medications as prescribed.  Make no changes unless your primary care provider suggests them.   Go to all your primary care provider visits.  Be sure to have all your required lab tests. This way, your medicines can be refilled on time.  Do not use any forms of alcohol.    Please Note:  If you have any questions at anytime after you are discharged please call [facility] at [phone].  [facility], Behavioral Intake [phone]  Medical Records call  [phone]  Outpatient Behavioral Intake call [phone]  LP+ Wait List/Bed Availability call [phone]    Please remember to take all of your behavioral discharge planning and lab paperwork to any follow up appointments, it contains your lab results, diagnosis, medication list and discharge recommendations.      THANK YOU FOR CHOOSING [facility]

## 2021-06-10 NOTE — PROGRESS NOTES
"[facility], [place]   Psychiatric Progress Note        Interim history   This is a 50 year old male with Alcohol use disorder severe.Pt seen in rounds.   The patient's care was discussed with the treatment team during the daily team meeting and/or staff's chart notes were reviewed.  Staff report patient has been visible in the milieu,  no acute eventsovernight.     Patient's mood is better  Energy Level:good  Sleep:No concerns, sleeps well through night  Appetite:fair low  motivation interest   deneid any Suicidal/homicidal ideation/plan intent.  Denied any psychosis  No prior suicde attempts  No access to gun  Pt is in alcohol withdrawal still being monitered every 4 hrs for it,   Pt mssa score are monitered  Tolerating meds and has no side effects.              Medications:     Current Facility-Administered Medications   Medication     acetaminophen (TYLENOL) tablet 650 mg     alum & mag hydroxide-simethicone (MAALOX) suspension 30 mL     diazepam (VALIUM) tablet 5-20 mg     folic acid (FOLVITE) tablet 1 mg     hydrOXYzine (ATARAX) tablet 25 mg     ibuprofen (ADVIL/MOTRIN) tablet 600 mg     multivitamin w/minerals (THERA-VIT-M) tablet 1 tablet     OLANZapine zydis (zyPREXA) ODT tab 5 mg     pseudoePHEDrine (SUDAFED) tablet 30 mg     senna-docusate (SENOKOT-S/PERICOLACE) 8.6-50 MG per tablet 1 tablet     thiamine (B-1) tablet 100 mg     traZODone (DESYREL) tablet 50 mg             Allergies:     Allergies   Allergen Reactions     Latex      Pcn [Penicillins] Hives and Rash            Psychiatric Examination:   Blood pressure (!) 133/91, pulse 77, temperature 98  F (36.7  C), temperature source Temporal, resp. rate 16, height 1.753 m (5' 9\"), weight 90.7 kg (200 lb), SpO2 97 %.  Weight is 200 lbs 0 oz  Body mass index is 29.53 kg/m .    Appearance:  awake, alert and adequately groomed  Attitude:  cooperative  Eye Contact:  good  Mood:  better  Affect:  appropriate and in normal range and " mood congruent  Speech:  clear, coherent rate /rhythm are good  Psychomotor Behavior:  no evidence of tardive dyskinesia, dystonia, or tics and intact station, gait and muscle tone  Throught Process:  logical  Associations:  no loose associations  Thought Content:  no evidence of suicidal ideation or homicidal ideation, no evidence of psychotic thought, no auditory hallucinations present and no visual hallucinations present  Insight:  good  Judgement:  intact  Oriented to:  time, person, and place  Attention Span and Concentration:  intact  Recent and Remote Memory:  intact  Language fund of knowledge are adequate         Labs:     No results found for: NTBNPI, NTBNP  Lab Results   Component Value Date    WBC 9.3 06/07/2021    HGB 14.0 06/08/2021    HCT 44.9 06/07/2021    MCV 88 06/07/2021     06/07/2021     Lab Results   Component Value Date    TSH 0.85 06/07/2021         DX   Alcohol use disorder severe   PLAN   pt is out of detox   Laboratory/Imaging: reviewed with patient   Consults: internal medicine consult reviewed  Patient will be treated in therapeutic milieu with appropriate individual and group therapies as described.  PDMP CHECKED     Supportive psychotheraoy provided, ilya talked about recovery enviroment, relapse prevention, triggers to use.  Discussed with patient many issues of addiction,triggers, relapse, and establishing a solid recovery program.  Asked pt to be med complinat   Medical diagnoses to be addressed this admission:    Plan:  A/P:  Retrosternal chest pain  History of reduced RV function  Prolonged QTc  Shortness of breath with minimal exertion, Chest pain started ~1:30pm today and is constant and severe and radiates to the abdominen and is not improved with medications and is worse with sitting down.  Patient also notes severe anxiety not improved with current medications. + palpitations. TTE 2013 with LVEF 50-55% no WMA and reduced LV function and mild LAE, calcified MV.  EKG with  "QTc prolongation 510ms, otherwise nsr. Troponins x 3 flat. CXR shows lungs clear, gastric bubble apparent, no acute pathology. CT PE protocol negative. Weight so far is stable at 200lb.   - repeat TTE today  - continue to trend daily weights, I&O  -monitor intakes and outputs  - avoid QTc prolonging medications     Concern for melena  Reported per patient though he is not an optimal historian (for example he stated he stretch marks over abdomen were bruises)  - hgb stable  - conditional hgb checks if hypotension/signs of bleeding     Concern for hematuria  Reports dark \"rust\" color to urine  - follow up UA, still pending      Medicine will follow up on TTE, UA, weights and I&O    Legal Status: voluntary    Safety Assessment:   Checks:  15 min  Precautions: withdrawal precautions  Pt has not required locked seclusion or restraints in the past 24 hours to maintain safety, please refer to RN documentation for further details.  Discussed with patient many issues of addiction,triggers, relapse, and establishing a solid recovery program.  Able to give informed consent:  YES   Discussed Risks/Benefits/Side Effects/Alternatives: YES    After discussion of the indications, risks, benefits, alternatives and consequences of no treatment, the patient elects to complete detox and do treatment  "

## 2021-06-10 NOTE — DISCHARGE SUMMARY
Anirudh Motley MRN# [id number]   Age: 50 year old YOB: 1970     Patient will be discharged tomorrow 6/11/2021 at 7 AM this is the anticipated discharge summary discharge     DISCHARGE  DX      Alcohol use disorder severe             Event Leading to Hospitalization:     See Admission note by admitting provider for patient encounter. for additional details.          Hospital Course:   PATIENT was admitted to [facility] attending  under DR zouna, please review the detailed admit note on 6/8/2021   The patient was placed under status 15 (15 minute checks) to ensure patient safety.   MSSA protocol was initiated due to the patient's history of alcohol abuse and concern for withdrawal symptoms.  CBC, BMP and utox obtained.    All outpatient medications were continued    PATIENTdid participate in groups and was visible in the milieu.     The patient's symptoms of alcohol withdrawal improved.     Patients energy motivation , sleep appetite improved.  Pt completed detox . It was un eventful.      Discussed with patient medications for craving.  Spoke with patient about triggers coping skills relapse prevention.    CONSULTS DONE DURING PATIENTS HOSPITALIZATION.  Patient was seen by medicine on date 6/8/2021    This as per their medical consult        Assessment and Recommendations:   Anirudh Motley is a 50 year old year old man with a history of EtOH abuse and withdrawal, withdrawal seizures, depression, anxiety who is admitted to [facility] with EtOH abuse and withdrawal.     # EtOH abuse and withdrawal  # MDD, Anxiety  Management per psychiatry team. Lab workup unremarkable. VSS.  - Agree with MSSA using valium  - Agree with thiamine, folic acid MVI  - Seizure precautions        Medicine will sign off, please page with any additional concerns.     He was seen again on 6/10/2021 by internal medicineA/P:  Grade 1 diastolic dysfunction  Recent retrosternal chest pain  History of reduced RV  "function  Prolonged QTc  (see also my note yesterday on 6/9/21)  - repeat TTE shows no RV dysfunction, grade 1 dyastolic dysfunction and hyperkinesis   - continue to trend daily weights, I&O while inpatient  -monitor intakes and outputs  - avoid QTc prolonging medications     Concern for melena  Reported per patient though he is not an optimal historian (for example he stated he stretch marks over abdomen were bruises)  - hgb stable  - conditional hgb checks if hypotension/signs of bleeding     Concern for hematuria  Reports dark \"rust\" color to urine  - UA is negative for RBCs or proteins     Medicine will sign off at this time, please call with questions or concerns      Pt was seen by cm  As per recommendations from cm    Patient is going to a wet house         Labs:reviewed with patient       Recent Results (from the past 48 hour(s))   EKG 12-lead, tracing only    Collection Time: 06/08/21  4:15 PM   Result Value Ref Range    Interpretation ECG Click View Image link to view waveform and result    Hemoglobin    Collection Time: 06/08/21  5:02 PM   Result Value Ref Range    Hemoglobin 14.0 13.3 - 17.7 g/dL   Magnesium    Collection Time: 06/08/21  5:02 PM   Result Value Ref Range    Magnesium 1.8 1.6 - 2.3 mg/dL   Troponin I    Collection Time: 06/08/21  5:02 PM   Result Value Ref Range    Troponin I ES <0.015 0.000 - 0.045 ug/L   Troponin I    Collection Time: 06/09/21  4:01 AM   Result Value Ref Range    Troponin I ES <0.015 0.000 - 0.045 ug/L   Lipid Profile    Collection Time: 06/09/21  4:01 AM   Result Value Ref Range    Cholesterol 139 <200 mg/dL    Triglycerides 193 (H) <150 mg/dL    HDL Cholesterol 55 >39 mg/dL    LDL Cholesterol Calculated 45 <100 mg/dL    Non HDL Cholesterol 84 <130 mg/dL   UA with Microscopic reflex to Culture    Collection Time: 06/10/21  2:05 AM    Specimen: Midstream Urine   Result Value Ref Range    Color Urine Yellow     Appearance Urine Clear     Glucose Urine Negative " NEG^Negative mg/dL    Bilirubin Urine Negative NEG^Negative    Ketones Urine Negative NEG^Negative mg/dL    Specific Gravity Urine 1.020 1.003 - 1.035    Blood Urine Negative NEG^Negative    pH Urine 7.5 (H) 5.0 - 7.0 pH    Protein Albumin Urine Negative NEG^Negative mg/dL    Urobilinogen mg/dL 2.0 0.0 - 2.0 mg/dL    Nitrite Urine Negative NEG^Negative    Leukocyte Esterase Urine Negative NEG^Negative    Source Midstream Urine     WBC Urine 1 0 - 5 /HPF    RBC Urine 1 0 - 2 /HPF    Bacteria Urine None (A) NEG^Negative /HPF    Squamous Epithelial /HPF Urine 0 0 - 1 /HPF    Mucous Urine Present (A) NEG^Negative /LPF   Drug abuse screen 6 urine (chem dep)    Collection Time: 06/10/21  2:05 AM   Result Value Ref Range    Amphetamine Qual Urine Negative NEG^Negative    Barbiturates Qual Urine Negative NEG^Negative    Benzodiazepine Qual Urine Positive (A) NEG^Negative    Cannabinoids Qual Urine Negative NEG^Negative    Cocaine Qual Urine Negative NEG^Negative    Ethanol Qual Urine Negative NEG^Negative    Opiates Qualitative Urine Negative NEG^Negative         Recent Results (from the past 240 hour(s))   Alcohol breath test POCT    Collection Time: 06/07/21  1:41 PM   Result Value Ref Range    Alcohol Breath Test 0.252 (A) 0.00 - 0.01   CBC with platelets differential    Collection Time: 06/07/21  2:16 PM   Result Value Ref Range    WBC 9.3 4.0 - 11.0 10e9/L    RBC Count 5.09 4.4 - 5.9 10e12/L    Hemoglobin 16.0 13.3 - 17.7 g/dL    Hematocrit 44.9 40.0 - 53.0 %    MCV 88 78 - 100 fl    MCH 31.4 26.5 - 33.0 pg    MCHC 35.6 31.5 - 36.5 g/dL    RDW 12.8 10.0 - 15.0 %    Platelet Count 230 150 - 450 10e9/L    Diff Method Automated Method     % Neutrophils 60.8 %    % Lymphocytes 29.2 %    % Monocytes 5.9 %    % Eosinophils 2.5 %    % Basophils 1.4 %    % Immature Granulocytes 0.2 %    Nucleated RBCs 0 0 /100    Absolute Neutrophil 5.7 1.6 - 8.3 10e9/L    Absolute Lymphocytes 2.7 0.8 - 5.3 10e9/L    Absolute Monocytes 0.6 0.0  - 1.3 10e9/L    Absolute Eosinophils 0.2 0.0 - 0.7 10e9/L    Absolute Basophils 0.1 0.0 - 0.2 10e9/L    Abs Immature Granulocytes 0.0 0 - 0.4 10e9/L    Absolute Nucleated RBC 0.0    Comprehensive metabolic panel    Collection Time: 06/07/21  2:16 PM   Result Value Ref Range    Sodium 139 133 - 144 mmol/L    Potassium 3.4 3.4 - 5.3 mmol/L    Chloride 102 94 - 109 mmol/L    Carbon Dioxide 23 20 - 32 mmol/L    Anion Gap 14 3 - 14 mmol/L    Glucose 88 70 - 99 mg/dL    Urea Nitrogen 13 7 - 30 mg/dL    Creatinine 0.64 (L) 0.66 - 1.25 mg/dL    GFR Estimate >90 >60 mL/min/[1.73_m2]    GFR Estimate If Black >90 >60 mL/min/[1.73_m2]    Calcium 8.5 8.5 - 10.1 mg/dL    Bilirubin Total 1.0 0.2 - 1.3 mg/dL    Albumin 4.4 3.4 - 5.0 g/dL    Protein Total 8.4 6.8 - 8.8 g/dL    Alkaline Phosphatase 86 40 - 150 U/L    ALT 41 0 - 70 U/L    AST 30 0 - 45 U/L   Lipase    Collection Time: 06/07/21  2:16 PM   Result Value Ref Range    Lipase 84 73 - 393 U/L   GGT    Collection Time: 06/07/21  2:16 PM   Result Value Ref Range    GGT 47 0 - 75 U/L   TSH with free T4 reflex    Collection Time: 06/07/21  2:16 PM   Result Value Ref Range    TSH 0.85 0.40 - 4.00 mU/L   Vitamin B12    Collection Time: 06/07/21  2:16 PM   Result Value Ref Range    Vitamin B12 669 193 - 986 pg/mL   Asymptomatic SARS-CoV-2 COVID-19 Virus (Coronavirus) by PCR    Collection Time: 06/07/21  4:14 PM    Specimen: Nasopharyngeal   Result Value Ref Range    SARS-CoV-2 Virus Specimen Source Nasopharyngeal     SARS-CoV-2 PCR Result NEGATIVE     SARS-CoV-2 PCR Comment (Note)    EKG 12-lead, tracing only    Collection Time: 06/08/21  4:15 PM   Result Value Ref Range    Interpretation ECG Click View Image link to view waveform and result    Hemoglobin    Collection Time: 06/08/21  5:02 PM   Result Value Ref Range    Hemoglobin 14.0 13.3 - 17.7 g/dL   Magnesium    Collection Time: 06/08/21  5:02 PM   Result Value Ref Range    Magnesium 1.8 1.6 - 2.3 mg/dL   Troponin I     Collection Time: 06/08/21  5:02 PM   Result Value Ref Range    Troponin I ES <0.015 0.000 - 0.045 ug/L   Troponin I    Collection Time: 06/09/21  4:01 AM   Result Value Ref Range    Troponin I ES <0.015 0.000 - 0.045 ug/L   Lipid Profile    Collection Time: 06/09/21  4:01 AM   Result Value Ref Range    Cholesterol 139 <200 mg/dL    Triglycerides 193 (H) <150 mg/dL    HDL Cholesterol 55 >39 mg/dL    LDL Cholesterol Calculated 45 <100 mg/dL    Non HDL Cholesterol 84 <130 mg/dL   UA with Microscopic reflex to Culture    Collection Time: 06/10/21  2:05 AM    Specimen: Midstream Urine   Result Value Ref Range    Color Urine Yellow     Appearance Urine Clear     Glucose Urine Negative NEG^Negative mg/dL    Bilirubin Urine Negative NEG^Negative    Ketones Urine Negative NEG^Negative mg/dL    Specific Gravity Urine 1.020 1.003 - 1.035    Blood Urine Negative NEG^Negative    pH Urine 7.5 (H) 5.0 - 7.0 pH    Protein Albumin Urine Negative NEG^Negative mg/dL    Urobilinogen mg/dL 2.0 0.0 - 2.0 mg/dL    Nitrite Urine Negative NEG^Negative    Leukocyte Esterase Urine Negative NEG^Negative    Source Midstream Urine     WBC Urine 1 0 - 5 /HPF    RBC Urine 1 0 - 2 /HPF    Bacteria Urine None (A) NEG^Negative /HPF    Squamous Epithelial /HPF Urine 0 0 - 1 /HPF    Mucous Urine Present (A) NEG^Negative /LPF   Drug abuse screen 6 urine (chem dep)    Collection Time: 06/10/21  2:05 AM   Result Value Ref Range    Amphetamine Qual Urine Negative NEG^Negative    Barbiturates Qual Urine Negative NEG^Negative    Benzodiazepine Qual Urine Positive (A) NEG^Negative    Cannabinoids Qual Urine Negative NEG^Negative    Cocaine Qual Urine Negative NEG^Negative    Ethanol Qual Urine Negative NEG^Negative    Opiates Qualitative Urine Negative NEG^Negative            Because this patient meets criteria for an Alcohol Use Disorder, I performed the following brief intervention on the date of this note:              1) Expressed concern that the patient  is drinking at unhealthy levels known to increase their risk of alcohol related problems              2) Gave feedback linking alcohol use and health, including personalized feedback explaining how alcohol use can interact with their medical and/or psychiatric problems, and with prescribed medications.              3) Advised patient to abstain.    PT counseled on nicotine cessation and nicotine replacement provided    Discussed with patient many issues of addiction,triggers, relapse, and establishing a solid recovery program.    DISCHARGE MENTAL STATUS EXAMINATION:  The patient is alert, oriented x3.  Good fund of knowledge.  Good use of language.  Recent and remote memory, language, fund of knowledge are all adequate.  Euthymic mood congruent affect  Speech normal rate/rhythm linear tp no loose asso,The patient does not have any active suicidal or homicidal ideation.  Does not have any auditory or visual hallucination.  Fair insight/judgment At this time, the patient was stable to be discharged.        Pt was not determined to not be a danger to himself or others. At the current time of discharge, the patient does not meet criteria for involuntary hospitalization. On the day of discharge, the patient reports that they do not have suicidal or homicidal ideation and would never hurt themselves or others. Steps taken to minimize risk include: assessing patient s behavior and thought process daily during hospital stay, discharging patient with adequate plan for follow up for mental and physical health and discussing safety plan of returning to the hospital should the patient ever have thoughts of harming themselves or others. Therefore, based on all available evidence including the factors cited above, the patient does not appear to be at imminent risk for self-harm, and is appropriate for outpatient level of care.     Educated about side effects/risk vs benefits /alternative including non treatment.Pt consented to be  "on medication.     .Total time spent on discharge summary more than 35 min  More than  20 min  planning, coordination of care, medication reconciliation and performance of physical exam on day of discharge.Care was coordinated with unit RN and unit therapist       Anirudh Motley   Home Medication Instructions SEVERIANO:[id number]    Printed on:06/10/21 0845   Medication Information                      folic acid (FOLVITE) 1 MG tablet  Take 1 tablet (1 mg) by mouth daily             multivitamin, therapeutic (THERA-VIT) TABS tablet  Take 1 tablet by mouth daily             thiamine (B-1) 100 MG tablet  Take 1 tablet (100 mg) by mouth daily                  Disposition: Disposition:   [facility]  Address: [address] [place] [address]  Phone: [phone]     Facts about COVID19 at www.cdc.gov/COVID19 and www.[place].gov/covid19     Keeping hands clean is one of the most important steps we can take to avoid getting sick and spreading germs to others.  Please wash your hands frequently and lather with soap for at least 20 seconds!     Medical Follow-Up: Follow-up with primary care in 1 to 2 weeks       Treatment Follow-Up:Disposition:   [facility]  Address: [address] [place] [address]  Phone: [phone]  .        \"Much or all of the text in this note was generated through the use of Dragon Dictate voice to text software. Errors in spelling or words which appear to be out of contact are unintentional, may be present due having escaped editing\"     "

## 2021-06-10 NOTE — PROGRESS NOTES
"Brief Medicine Note    Following up regarding retrosternal chest pain.     Workup: serial troponins, EKG, CT PE protocol all negative and labs normal. TTE no concerning findings    Today's vital signs, medications, and nursing notes were reviewed. Labs reviewed.     BP (!) 133/91   Pulse 77   Temp 98  F (36.7  C) (Temporal)   Resp 16   Ht 1.753 m (5' 9\")   Wt 90.7 kg (200 lb)   SpO2 97%   BMI 29.53 kg/m        A/P:  Grade 1 diastolic dysfunction  Recent retrosternal chest pain  History of reduced RV function  Prolonged QTc  (see also my note yesterday on 6/9/21)  - repeat TTE shows no RV dysfunction, grade 1 dyastolic dysfunction and hyperkinesis   - continue to trend daily weights, I&O while inpatient  -monitor intakes and outputs  - avoid QTc prolonging medications     Concern for melena  Reported per patient though he is not an optimal historian (for example he stated he stretch marks over abdomen were bruises)  - hgb stable  - conditional hgb checks if hypotension/signs of bleeding     Concern for hematuria  Reports dark \"rust\" color to urine  - UA is negative for RBCs or proteins    Medicine will sign off at this time, please call with questions or concerns    Avelina Wset PA-C  [facility]  Contact information available via [facility] Paging/Directory    "

## 2021-06-10 NOTE — PLAN OF CARE
"Patient has experienced a largely positive day on [facility].  They continue to report total absence of any and all suicidal ideation while on chang.  Patient was made aware that he has two vitamins ordered as discharge medications.  Patient states that he declines these ordered medications, instead preferring to procure them \"over-the counter\" at his [facility]'s or [facility] of choice.  Will continue to monitor as prudent.    "

## 2021-06-10 NOTE — PROGRESS NOTES
Case Management Note  6/10/2021      Pt is going to [facility], plan for pt to discharge 6/11/21 early AM (required by [facility]), plan to discharge at 7 AM.     Admit date/time: Cab is set up for 7 AM. [employer] ([phone]). Confirmation#: [id number]    Address: [address] [place] [address]  Phone: [phone]    If cab does not show by 7:30, [facility] can call for cab from cab account, cab to address listed above.    Mary Askew, [facility], LADC

## 2021-06-11 ASSESSMENT — ACTIVITIES OF DAILY LIVING (ADL)
LAUNDRY: WITH SUPERVISION
ORAL_HYGIENE: INDEPENDENT
DRESS: INDEPENDENT
HYGIENE/GROOMING: INDEPENDENT

## 2021-06-11 NOTE — PLAN OF CARE
Problem: Substance Withdrawal  Goal: Substance Withdrawal  Description: Signs and symptoms of listed problems will be absent or manageable.  Outcome: Completed     Pt has not required valium for withdrawal symptoms for 24 hours. Pt is now placed in Out Of Detox Status.    Pt to discharge the morning of 6/11 to \[facility]\"". See note by  Mary for details on transport.

## 2021-06-11 NOTE — PLAN OF CARE
Pt.was discharged to a wet house via a cab around 0700. Pt.denied SI/SIB/HI. He did not wish to be dead. Writer reviewed his coping plans. Educated on medication management, sobriety, and follow-up appointments. Pt.discharged with all his belongings.

## 2021-08-03 PROBLEM — F10.939 ALCOHOL WITHDRAWAL (H): Status: RESOLVED | Noted: 2017-07-10 | Resolved: 2017-09-23
